# Patient Record
Sex: MALE | Race: WHITE | Employment: FULL TIME | URBAN - METROPOLITAN AREA
[De-identification: names, ages, dates, MRNs, and addresses within clinical notes are randomized per-mention and may not be internally consistent; named-entity substitution may affect disease eponyms.]

---

## 2017-02-13 ENCOUNTER — TELEPHONE (OUTPATIENT)
Dept: FAMILY MEDICINE | Facility: CLINIC | Age: 27
End: 2017-02-13

## 2017-02-13 ENCOUNTER — HOSPITAL ENCOUNTER (EMERGENCY)
Facility: CLINIC | Age: 27
Discharge: HOME | End: 2017-02-13
Attending: PHYSICIAN ASSISTANT | Admitting: PHYSICIAN ASSISTANT
Payer: COMMERCIAL

## 2017-02-13 ENCOUNTER — APPOINTMENT (OUTPATIENT)
Dept: CT IMAGING | Facility: CLINIC | Age: 27
End: 2017-02-13
Attending: PHYSICIAN ASSISTANT
Payer: COMMERCIAL

## 2017-02-13 VITALS
TEMPERATURE: 98.5 F | SYSTOLIC BLOOD PRESSURE: 120 MMHG | OXYGEN SATURATION: 97 % | WEIGHT: 162 LBS | BODY MASS INDEX: 22.81 KG/M2 | HEART RATE: 74 BPM | DIASTOLIC BLOOD PRESSURE: 80 MMHG | RESPIRATION RATE: 16 BRPM

## 2017-02-13 DIAGNOSIS — S20.212A CONTUSION OF RIB ON LEFT SIDE, INITIAL ENCOUNTER: ICD-10-CM

## 2017-02-13 LAB
ALBUMIN UR-MCNC: NEGATIVE MG/DL
ANION GAP SERPL CALCULATED.3IONS-SCNC: 5 MMOL/L (ref 3–14)
APPEARANCE UR: CLEAR
BACTERIA #/AREA URNS HPF: ABNORMAL /HPF
BASOPHILS # BLD AUTO: 0 10E9/L (ref 0–0.2)
BASOPHILS NFR BLD AUTO: 0.2 %
BILIRUB UR QL STRIP: NEGATIVE
BUN SERPL-MCNC: 11 MG/DL (ref 7–30)
CALCIUM SERPL-MCNC: 9.6 MG/DL (ref 8.5–10.1)
CHLORIDE SERPL-SCNC: 109 MMOL/L (ref 94–109)
CO2 SERPL-SCNC: 30 MMOL/L (ref 20–32)
COLOR UR AUTO: YELLOW
CREAT SERPL-MCNC: 0.65 MG/DL (ref 0.66–1.25)
DIFFERENTIAL METHOD BLD: NORMAL
EOSINOPHIL # BLD AUTO: 0.2 10E9/L (ref 0–0.7)
EOSINOPHIL NFR BLD AUTO: 2.4 %
ERYTHROCYTE [DISTWIDTH] IN BLOOD BY AUTOMATED COUNT: 12.5 % (ref 10–15)
GFR SERPL CREATININE-BSD FRML MDRD: ABNORMAL ML/MIN/1.7M2
GLUCOSE SERPL-MCNC: 101 MG/DL (ref 70–99)
GLUCOSE UR STRIP-MCNC: 500 MG/DL
HCT VFR BLD AUTO: 45.9 % (ref 40–53)
HGB BLD-MCNC: 16 G/DL (ref 13.3–17.7)
HGB UR QL STRIP: NEGATIVE
IMM GRANULOCYTES # BLD: 0 10E9/L (ref 0–0.4)
IMM GRANULOCYTES NFR BLD: 0.2 %
KETONES UR STRIP-MCNC: NEGATIVE MG/DL
LEUKOCYTE ESTERASE UR QL STRIP: NEGATIVE
LYMPHOCYTES # BLD AUTO: 2.4 10E9/L (ref 0.8–5.3)
LYMPHOCYTES NFR BLD AUTO: 29.5 %
MCH RBC QN AUTO: 30 PG (ref 26.5–33)
MCHC RBC AUTO-ENTMCNC: 34.9 G/DL (ref 31.5–36.5)
MCV RBC AUTO: 86 FL (ref 78–100)
MONOCYTES # BLD AUTO: 0.4 10E9/L (ref 0–1.3)
MONOCYTES NFR BLD AUTO: 5.1 %
NEUTROPHILS # BLD AUTO: 5.1 10E9/L (ref 1.6–8.3)
NEUTROPHILS NFR BLD AUTO: 62.6 %
NITRATE UR QL: NEGATIVE
PH UR STRIP: 7 PH (ref 5–7)
PLATELET # BLD AUTO: 184 10E9/L (ref 150–450)
POTASSIUM SERPL-SCNC: 4.1 MMOL/L (ref 3.4–5.3)
RBC # BLD AUTO: 5.33 10E12/L (ref 4.4–5.9)
RBC #/AREA URNS AUTO: ABNORMAL /HPF (ref 0–2)
SODIUM SERPL-SCNC: 144 MMOL/L (ref 133–144)
SP GR UR STRIP: 1.01 (ref 1–1.03)
URN SPEC COLLECT METH UR: ABNORMAL
UROBILINOGEN UR STRIP-ACNC: 0.2 EU/DL (ref 0.2–1)
WBC # BLD AUTO: 8.2 10E9/L (ref 4–11)
WBC #/AREA URNS AUTO: ABNORMAL /HPF (ref 0–2)

## 2017-02-13 PROCEDURE — 25000128 H RX IP 250 OP 636: Performed by: PHYSICIAN ASSISTANT

## 2017-02-13 PROCEDURE — 96374 THER/PROPH/DIAG INJ IV PUSH: CPT | Mod: 59

## 2017-02-13 PROCEDURE — 85025 COMPLETE CBC W/AUTO DIFF WBC: CPT | Performed by: PHYSICIAN ASSISTANT

## 2017-02-13 PROCEDURE — 81001 URINALYSIS AUTO W/SCOPE: CPT | Performed by: FAMILY MEDICINE

## 2017-02-13 PROCEDURE — 25000125 ZZHC RX 250: Performed by: PHYSICIAN ASSISTANT

## 2017-02-13 PROCEDURE — 99285 EMERGENCY DEPT VISIT HI MDM: CPT | Mod: 25

## 2017-02-13 PROCEDURE — 99285 EMERGENCY DEPT VISIT HI MDM: CPT | Performed by: PHYSICIAN ASSISTANT

## 2017-02-13 PROCEDURE — 80048 BASIC METABOLIC PNL TOTAL CA: CPT | Performed by: PHYSICIAN ASSISTANT

## 2017-02-13 PROCEDURE — 25500064 ZZH RX 255 OP 636: Performed by: PHYSICIAN ASSISTANT

## 2017-02-13 PROCEDURE — 71260 CT THORAX DX C+: CPT

## 2017-02-13 RX ORDER — IOPAMIDOL 755 MG/ML
500 INJECTION, SOLUTION INTRAVASCULAR ONCE
Status: COMPLETED | OUTPATIENT
Start: 2017-02-13 | End: 2017-02-13

## 2017-02-13 RX ORDER — HYDROCODONE BITARTRATE AND ACETAMINOPHEN 5; 325 MG/1; MG/1
1-2 TABLET ORAL EVERY 6 HOURS PRN
Qty: 15 TABLET | Refills: 0 | Status: SHIPPED | OUTPATIENT
Start: 2017-02-13 | End: 2017-03-30

## 2017-02-13 RX ORDER — KETOROLAC TROMETHAMINE 30 MG/ML
30 INJECTION, SOLUTION INTRAMUSCULAR; INTRAVENOUS ONCE
Status: COMPLETED | OUTPATIENT
Start: 2017-02-13 | End: 2017-02-13

## 2017-02-13 RX ORDER — LIDOCAINE 40 MG/G
CREAM TOPICAL
Status: DISCONTINUED | OUTPATIENT
Start: 2017-02-13 | End: 2017-02-13 | Stop reason: HOSPADM

## 2017-02-13 RX ADMIN — KETOROLAC TROMETHAMINE 30 MG: 30 INJECTION, SOLUTION INTRAMUSCULAR at 14:25

## 2017-02-13 RX ADMIN — IOPAMIDOL 70 ML: 755 INJECTION, SOLUTION INTRAVENOUS at 14:02

## 2017-02-13 RX ADMIN — SODIUM CHLORIDE 70 ML: 9 INJECTION, SOLUTION INTRAVENOUS at 14:02

## 2017-02-13 ASSESSMENT — ENCOUNTER SYMPTOMS
NAUSEA: 0
NECK PAIN: 0
COLOR CHANGE: 0
ABDOMINAL PAIN: 0
BACK PAIN: 1
FLANK PAIN: 1
VOMITING: 0
WOUND: 0
NUMBNESS: 0
DIARRHEA: 0
SHORTNESS OF BREATH: 1
WEAKNESS: 0
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
## 2017-02-13 NOTE — DISCHARGE INSTRUCTIONS
Use ibuprofen or Tylenol as needed for pain during the day, reserve use of Norco for at bedtime for severe breakthrough pain. Practice taking deep breaths to help keep lungs expanded. Pain can last up to 6 weeks, but should start to improve in a week or two. If you experience worsening shortness of breath or chest pain, return to the emergency department immediately. Follow up in clinic as needed for persistent symptoms.    Thank you for choosing [facility] Emergency Department. It was a pleasure taking care of you today. If you have any questions, please call [phone].    Aleida Adam PA-C    Rib Contusion    A rib contusion is a bruise to one or more rib bones. It may cause pain, tenderness, swelling and a purplish discoloration. There may be a sharp pain while breathing  You will be assessed for other injuries. You will likely be given medication for pain. Rib contusions heal on their own, without further treatment. However, pain may take weeks to months to go away.   Note that a small crack (fracture) in the rib may cause the same symptoms as a rib contusion. The small crack may not be seen on a chest x-ray. However, the two conditions are managed in the same way.  Home care    Rest. Avoid heavy lifting, strenuous exertion, or any activity that causes pain.    Ice the area to reduce pain and swelling. Put ice cubes in a plastic bag or use a cold pack. (Wrap the cold source in a thin towel. Do not place it directly on your skin.) Ice the injured area for 20 minutes every 1-2 hours the first day. Continue with ice packs 3-4 times a day for the next two days, then as needed for the relief of pain and swelling.    Take any prescribed pain medication. If none was prescribed, take acetaminophen, ibuprofen, or naproxen to control pain.  Follow-up care  Follow up with your healthcare provider during the next week or as directed.  When to seek medical advice  Call your healthcare provider for any of  the following:    Shortness of breath or trouble breathing    Increasing chest pain with breathing    Coughing    Dizziness, weakness, or fainting    New or worsening pain    Fever of 100.4 F (38 C) or higher, or as directed by your healthcare provider
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
## 2017-02-13 NOTE — ED AVS SNAPSHOT
[facility] Emergency Department    [address]     [place] [address]    Phone:  [phone]    Fax:  [phone]                                       Rafat Baird   MRN: [id number]    Department:  [facility] Emergency Department   Date of Visit:  2/13/2017           Patient Information     Date Of Birth          1990        Your diagnoses for this visit were:     Contusion of rib on left side, initial encounter        You were seen by Aleida Adam PA-C.      Follow-up Information     Follow up with Jose Sood MD In 2 weeks.    Specialty:  Family Practice    Why:  As needed for persisting symptoms    Contact information:    [facility]  [address]   [place] [address] [phone]          Follow up with [facility] Emergency Department.    Specialty:  EMERGENCY MEDICINE    Why:  If symptoms worsen    Contact information:    [address]   [place] [address] [phone]    Additional information:    From [address]: Exit at [address] on [address] side of [place]. Turn right on [address]. Turn left at stoplight on [address]. [facility] will be in view two blocks ahead        Discharge Instructions         Use ibuprofen or Tylenol as needed for pain during the day, reserve use of Norco for at bedtime for severe breakthrough pain. Practice taking deep breaths to help keep lungs expanded. Pain can last up to 6 weeks, but should start to improve in a week or two. If you experience worsening shortness of breath or chest pain, return to the emergency department immediately. Follow up in clinic as needed for persistent symptoms.    Thank you for choosing [facility] Emergency Department. It was a pleasure taking care of you today. If you have any questions, please call [phone].    Aleida Adam PA-C    Rib Contusion    A rib contusion is a bruise to one or more rib bones. It may cause  pain, tenderness, swelling and a purplish discoloration. There may be a sharp pain while breathing  You will be assessed for other injuries. You will likely be given medication for pain. Rib contusions heal on their own, without further treatment. However, pain may take weeks to months to go away.   Note that a small crack (fracture) in the rib may cause the same symptoms as a rib contusion. The small crack may not be seen on a chest x-ray. However, the two conditions are managed in the same way.  Home care    Rest. Avoid heavy lifting, strenuous exertion, or any activity that causes pain.    Ice the area to reduce pain and swelling. Put ice cubes in a plastic bag or use a cold pack. (Wrap the cold source in a thin towel. Do not place it directly on your skin.) Ice the injured area for 20 minutes every 1-2 hours the first day. Continue with ice packs 3-4 times a day for the next two days, then as needed for the relief of pain and swelling.    Take any prescribed pain medication. If none was prescribed, take acetaminophen, ibuprofen, or naproxen to control pain.  Follow-up care  Follow up with your healthcare provider during the next week or as directed.  When to seek medical advice  Call your healthcare provider for any of the following:    Shortness of breath or trouble breathing    Increasing chest pain with breathing    Coughing    Dizziness, weakness, or fainting    New or worsening pain    Fever of 100.4 F (38 C) or higher, or as directed by your healthcare provider        24 Hour Appointment Hotline       To make an appointment at any [facility], call [phone] ([phone]). If you don't have a family doctor or clinic, we will help you find one. [facility] clinics are conveniently located to serve the needs of you and your family.             Review of your medicines      START taking        Dose / Directions Last dose taken    HYDROcodone-acetaminophen 5-325 MG per tablet   Commonly known as:   NORCO   Dose:  1-2 tablet   Quantity:  15 tablet        Take 1-2 tablets by mouth every 6 hours as needed for moderate to severe pain   Refills:  0          Our records show that you are taking the medicines listed below. If these are incorrect, please call your family doctor or clinic.        Dose / Directions Last dose taken    ACCU-CHEK ROBSON test strip   Quantity:  100 strip   Generic drug:  blood glucose monitoring        Use to test blood sugars 6-7 times daily.   Refills:  prn        * ACE/ARB NOT PRESCRIBED (INTENTIONAL)        by Other route continuous prn.   Refills:  0        albuterol 108 (90 BASE) MCG/ACT Inhaler   Commonly known as:  PROAIR HFA/PROVENTIL HFA/VENTOLIN HFA   Dose:  2 puff   Quantity:  1 Inhaler        Inhale 2 puffs into the lungs every 6 hours as needed for shortness of breath / dyspnea or wheezing   Refills:  0        * ASPIRIN NOT PRESCRIBED   Commonly known as:  INTENTIONAL        by Other route continuous prn.   Refills:  0        HumaLOG KWIKpen 100 UNIT/ML injection   Quantity:  15 mL   Generic drug:  insulin lispro        INJECT SUBCUTANEOUSLY 3 TIMES DAILY (BEFORE MEALS) USE ACCORDING TO SLIDING SCALE   Refills:  0        IBUPROFEN PO   Dose:  600 mg        Take 600 mg by mouth   Refills:  0        insulin pen needle 31G X 5 MM   Dose:  1 Box   Quantity:  100 each        1 Box See Admin Instructions Use 4 time(s) a day.   Refills:  6        LANTUS SOLOSTAR 100 UNIT/ML injection   Quantity:  15 mL   Generic drug:  insulin glargine        INJECT 45 UNITS SUBCUTANEOUSLY AT BEDTIME   Refills:  2        * STATIN NOT PRESCRIBED (INTENTIONAL)   Quantity:  0 each        Statin not prescribed intentionally due to {CHOOSE REASON BEFORE SIGNING!!!:000502}   Refills:  0        * Notice:  This list has 3 medication(s) that are the same as other medications prescribed for you. Read the directions carefully, and ask your doctor or other care provider to review them with you.           "  Prescriptions were sent or printed at these locations (1 Prescription)                   [facility], [place] - [address]    [address] , [place] [address]    Telephone:  [phone]   Fax:  [phone]   Hours:                  Printed at Department/Unit printer (1 of 1)         HYDROcodone-acetaminophen (NORCO) 5-325 MG per tablet                Procedures and tests performed during your visit     Basic metabolic panel    CBC with platelets differential    CT Chest Pulmonary Embolism w Contrast    UA with Microscopic      Orders Needing Specimen Collection     None      Pending Results     No orders found from 2017 to 2017.            Pending Culture Results     No orders found from 2017 to 2017.            Thank you for choosing [facility]       Thank you for choosing [facility] for your care. Our goal is always to provide you with excellent care. Hearing back from our patients is one way we can continue to improve our services. Please take a few minutes to complete the written survey that you may receive in the mail after you visit with us. Thank you!        [employer] Information     [employer] lets you send messages to your doctor, view your test results, renew your prescriptions, schedule appointments and more. To sign up, go to [email or web address] . Click on \"Log in\" on the left side of the screen, which will take you to the Welcome page. Then click on \"Sign up Now\" on the right side of the page.     You will be asked to enter the access code listed below, as well as some personal information. Please follow the directions to create your username and password.     Your access code is: [email or web address]  Expires: 2017  3:14 PM     Your access code will  in 90 days. If you need help or a new code, please call your [facility] clinic or [phone].        Care EveryWhere ID     This is your Care EveryWhere ID. This could be used by other organizations to " access your [facility] medical records  [id number]        After Visit Summary       This is your record. Keep this with you and show to your community pharmacist(s) and doctor(s) at your next visit.
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
## 2017-02-13 NOTE — ED AVS SNAPSHOT
[facility] Emergency Department    [address] [place] [address]    Phone:  [phone]    Fax:  [phone]                                       Rafat Baird   MRN: [id number]    Department:  [facility] Emergency Department   Date of Visit:  2/13/2017           After Visit Summary Signature Page     I have received my discharge instructions, and my questions have been answered. I have discussed any challenges I see with this plan with the nurse or doctor.    ..........................................................................................................................................  Patient/Patient Representative Signature      ..........................................................................................................................................  Patient Representative Print Name and Relationship to Patient    ..................................................               ................................................  Date                                            Time    ..........................................................................................................................................  Reviewed by Signature/Title    ...................................................              ..............................................  Date                                                            Time
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
## 2017-03-01 NOTE — PROGRESS NOTES
SUBJECTIVE:                                                    Rafat Baird is a 26 year old male who presents to clinic today for the following health issues:      ED/UC Followup:    Facility:  [facility]  Date of visit: 02/13/17  Reason for visit: Contusion of left side   Current Status: minor pain that comes and goes     Acute Illness   Acute illness concerns: Sinus  Onset: several weeks with pain developing on Monday    Fever: no    Chills/Sweats: YES    Headache (location?): YES    Sinus Pressure:YES- post-nasal drainage, facial pain and teeth pain    Conjunctivitis:  no    Ear Pain: YES: bilateral    Rhinorrhea: YES    Congestion: YES    Sore Throat: no     Cough: YES-productive of yellow sputum, productive of green sputum    Wheeze: YES    Decrease d Appetite: no    Nausea: YES    Vomiting: no    Diarrhea:  no    Dysuria/Freq.: no    Fatigue/Achiness: YES    Sick/Strep Exposure: no     Therapies Tried and outcome: mucinex    Patient is here both for follow up of contusion of the left ribs which he reports has much improved. More importantly to him is development of sinus pressure and pain over the last 4-5 days. He reports that he had cold and allergy symptoms for the last several weeks.   -------------------------------------    Problem list and histories reviewed & adjusted, as indicated.  Additional history: as documented    BP Readings from Last 3 Encounters:   03/02/17 120/82   02/13/17 120/80   10/24/16 120/62    Wt Readings from Last 3 Encounters:   03/02/17 166 lb (75.3 kg)   02/13/17 162 lb (73.5 kg)   10/24/16 165 lb 14.4 oz (75.3 kg)                    Reviewed and updated as needed this visit by clinical staff  Tobacco  Allergies  Meds  Problems  Med Hx  Surg Hx  Fam Hx  Soc Hx        Reviewed and updated as needed this visit by Provider  Tobacco  Allergies  Meds  Problems  Med Hx  Surg Hx  Fam Hx  Soc Hx          ROS:  Constitutional, HEENT, cardiovascular, pulmonary, gi and gu  "systems are negative, except as otherwise noted.    OBJECTIVE:                                                    /82 (Cuff Size: Adult Regular)  Pulse 64  Temp 97.8  F (36.6  C) (Temporal)  Resp 16  Ht 5' 10.67\" (1.795 m)  Wt 166 lb (75.3 kg)  BMI 23.37 kg/m2  Body mass index is 23.37 kg/(m^2).  GENERAL: alert and no distress  EYES: Eyes grossly normal to inspection, PERRL and conjunctivae and sclerae normal  HENT: normal cephalic/atraumatic, both ears: clear effusion, rhinorrhea yellow, oropharynx clear, oral mucous membranes moist and sinuses: maxillary tenderness on both sides  NECK: bilateral anterior cervical adenopathy  RESP: lungs clear to auscultation - no rales, rhonchi or wheezes  CHEST: no pain to palpation, equal chest wall movement  CV: regular rates and rhythm, peripheral pulses strong and no peripheral edema  MS: no gross musculoskeletal defects noted, no edema  SKIN: no suspicious lesions or rashes  PSYCH: mentation appears normal, affect normal/bright    Diagnostic Test Results:  none      ASSESSMENT/PLAN:                                                      Tobacco Cessation:   reports that he has been smoking.  He has a 0.25 pack-year smoking history. He has never used smokeless tobacco.  Tobacco Cessation Action Plan: Self help information given to patient        ICD-10-CM    1. Acute sinusitis with symptoms > 10 days J01.90 amoxicillin (AMOXIL) 875 MG tablet   2. Tobacco use disorder F17.200    3. Hospital discharge follow-up Z09    4. Chest wall contusion, left, subsequent encounter S20.212D        I will treat for sinus infection and have patient continue home cares for rib pain. I discussed smoking cessation and encouraged him to work on a quit plan. Follow up if worsening or not improving.  See Patient Instructions    Michelle Neri PA-C  [facility]      "

## 2017-03-02 ENCOUNTER — OFFICE VISIT (OUTPATIENT)
Dept: FAMILY MEDICINE | Facility: OTHER | Age: 27
End: 2017-03-02
Payer: COMMERCIAL

## 2017-03-02 VITALS
RESPIRATION RATE: 16 BRPM | HEART RATE: 64 BPM | SYSTOLIC BLOOD PRESSURE: 120 MMHG | TEMPERATURE: 97.8 F | BODY MASS INDEX: 23.24 KG/M2 | HEIGHT: 71 IN | WEIGHT: 166 LBS | DIASTOLIC BLOOD PRESSURE: 82 MMHG

## 2017-03-02 DIAGNOSIS — Z09 HOSPITAL DISCHARGE FOLLOW-UP: ICD-10-CM

## 2017-03-02 DIAGNOSIS — J01.90 ACUTE SINUSITIS WITH SYMPTOMS > 10 DAYS: Primary | ICD-10-CM

## 2017-03-02 DIAGNOSIS — F17.200 TOBACCO USE DISORDER: ICD-10-CM

## 2017-03-02 DIAGNOSIS — S20.212D CHEST WALL CONTUSION, LEFT, SUBSEQUENT ENCOUNTER: ICD-10-CM

## 2017-03-02 PROCEDURE — 99214 OFFICE O/P EST MOD 30 MIN: CPT | Performed by: PHYSICIAN ASSISTANT

## 2017-03-02 RX ORDER — AMOXICILLIN 875 MG
875 TABLET ORAL 2 TIMES DAILY
Qty: 20 TABLET | Refills: 0 | Status: SHIPPED | OUTPATIENT
Start: 2017-03-02 | End: 2017-03-30

## 2017-03-02 ASSESSMENT — PAIN SCALES - GENERAL: PAINLEVEL: MILD PAIN (3)

## 2017-03-02 NOTE — NURSING NOTE
"Chief Complaint   Patient presents with     Hospital F/U     Panel Management     pneumovax, mychart, ldl, a1c, microalbumin, eye exam, foot exam       Initial /82 (Cuff Size: Adult Regular)  Pulse 64  Temp 97.8  F (36.6  C) (Temporal)  Resp 16  Ht 5' 10.67\" (1.795 m)  Wt 166 lb (75.3 kg)  BMI 23.37 kg/m2 Estimated body mass index is 23.37 kg/(m^2) as calculated from the following:    Height as of this encounter: 5' 10.67\" (1.795 m).    Weight as of this encounter: 166 lb (75.3 kg).  Medication Reconciliation: complete     Yashira Irby CMA (AAMA)      "

## 2017-03-02 NOTE — PATIENT INSTRUCTIONS
Sinusitis (Antibiotic Treatment)     The sinuses are air-filled spaces within the bones of the face. They connect to the inside of the nose. Sinusitis is an inflammation of the tissue lining the sinus cavity. Sinus inflammation can occur during a cold. It can also be due to allergies to pollens and other particles in the air. Sinusitis can cause symptoms of sinus congestion and fullness. A sinus infection causes fever, headache and facial pain. There is often green or yellow drainage from the nose or into the back of the throat (post-nasal drip). You have been given antibiotics to treat this condition.  Home care:    Take the full course of antibiotics as instructed. Do not stop taking them, even if you feel better.    Drink plenty of water, hot tea, and other liquids. This may help thin mucus. It also may promote sinus drainage.    Heat may help soothe painful areas of the face. Use a towel soaked in hot water. Or,  the shower and direct the hot spray onto your face. Using a vaporizer along with a menthol rub at night may also help.     An expectorant containing guaifenesin may help thin the mucus and promote drainage from the sinuses.    Over-the-counter decongestants may be used unless a similar medicine was prescribed. Nasal sprays work the fastest. Use one that contains phenylephrine or oxymetazoline. First blow the nose gently. Then use the spray. Do not use these medicines more often than directed on the label or symptoms may get worse. You may also use tablets containing pseudoephedrine. Avoid products that combine ingredients, because side effects may be increased. Read labels. You can also ask the pharmacist for help. (NOTE: Persons with high blood pressure should not use decongestants. They can raise blood pressure.)    Over-the-counter antihistamines may help if allergies contributed to your sinusitis.      Do not use nasal rinses or irrigation during an acute sinus infection, unless told to by  your health care provider. Rinsing may spread the infection to other sinuses.    Use acetaminophen or ibuprofen to control pain, unless another pain medicine was prescribed. (If you have chronic liver or kidney disease or ever had a stomach ulcer, talk with your doctor before using these medicines. Aspirin should never be used in anyone under 18 years of age who is ill with a fever. It may cause severe liver damage.)    Don't smoke. This can worsen symptoms.  Follow-up care  Follow up with your healthcare provider or our staff if you are not improving within the next week.  When to seek medical advice  Call your healthcare provider if any of these occur:    Facial pain or headache becoming more severe    Stiff neck    Unusual drowsiness or confusion    Swelling of the forehead or eyelids    Vision problems, including blurred or double vision    Fever of 100.4 F (38 C) or higher, or as directed by your healthcare provider    Seizure    Breathing problems    Symptoms not resolving within 10 days    5253-9242 [employer]. [address] [place] [address], [place] [address]. All rights reserved. This information is not intended as a substitute for professional medical care. Always follow your healthcare professional's instructions.

## 2017-03-02 NOTE — MR AVS SNAPSHOT
After Visit Summary   3/2/2017    Rafat Baird    MRN: [id number]           Patient Information     Date Of Birth          1990        Visit Information        Provider Department      3/2/2017 8:40 AM Michelle Neri PA-C [facility]        Today's Diagnoses     Acute sinusitis with symptoms > 10 days    -  1    Tobacco use disorder          Care Instructions      Sinusitis (Antibiotic Treatment)     The sinuses are air-filled spaces within the bones of the face. They connect to the inside of the nose. Sinusitis is an inflammation of the tissue lining the sinus cavity. Sinus inflammation can occur during a cold. It can also be due to allergies to pollens and other particles in the air. Sinusitis can cause symptoms of sinus congestion and fullness. A sinus infection causes fever, headache and facial pain. There is often green or yellow drainage from the nose or into the back of the throat (post-nasal drip). You have been given antibiotics to treat this condition.  Home care:    Take the full course of antibiotics as instructed. Do not stop taking them, even if you feel better.    Drink plenty of water, hot tea, and other liquids. This may help thin mucus. It also may promote sinus drainage.    Heat may help soothe painful areas of the face. Use a towel soaked in hot water. Or,  the shower and direct the hot spray onto your face. Using a vaporizer along with a menthol rub at night may also help.     An expectorant containing guaifenesin may help thin the mucus and promote drainage from the sinuses.    Over-the-counter decongestants may be used unless a similar medicine was prescribed. Nasal sprays work the fastest. Use one that contains phenylephrine or oxymetazoline. First blow the nose gently. Then use the spray. Do not use these medicines more often than directed on the label or symptoms may get worse. You may also use tablets containing pseudoephedrine. Avoid products  that combine ingredients, because side effects may be increased. Read labels. You can also ask the pharmacist for help. (NOTE: Persons with high blood pressure should not use decongestants. They can raise blood pressure.)    Over-the-counter antihistamines may help if allergies contributed to your sinusitis.      Do not use nasal rinses or irrigation during an acute sinus infection, unless told to by your health care provider. Rinsing may spread the infection to other sinuses.    Use acetaminophen or ibuprofen to control pain, unless another pain medicine was prescribed. (If you have chronic liver or kidney disease or ever had a stomach ulcer, talk with your doctor before using these medicines. Aspirin should never be used in anyone under 18 years of age who is ill with a fever. It may cause severe liver damage.)    Don't smoke. This can worsen symptoms.  Follow-up care  Follow up with your healthcare provider or our staff if you are not improving within the next week.  When to seek medical advice  Call your healthcare provider if any of these occur:    Facial pain or headache becoming more severe    Stiff neck    Unusual drowsiness or confusion    Swelling of the forehead or eyelids    Vision problems, including blurred or double vision    Fever of 100.4 F (38 C) or higher, or as directed by your healthcare provider    Seizure    Breathing problems    Symptoms not resolving within 10 days    6199-3108 [employer]. [address] [place] [address], [place] [address]. All rights reserved. This information is not intended as a substitute for professional medical care. Always follow your healthcare professional's instructions.              Follow-ups after your visit        Who to contact     If you have questions or need follow up information about today's clinic visit or your schedule please contact [facility] directly at [phone].  Normal or non-critical lab and imaging results will be  "communicated to you by Kraken[employer], letter or phone within 4 business days after the clinic has received the results. If you do not hear from us within 7 days, please contact the clinic through [employer] or phone. If you have a critical or abnormal lab result, we will notify you by phone as soon as possible.  Submit refill requests through [employer] or call your pharmacy and they will forward the refill request to us. Please allow 3 business days for your refill to be completed.          Additional Information About Your Visit        [employer] Information     [employer] lets you send messages to your doctor, view your test results, renew your prescriptions, schedule appointments and more. To sign up, go to [email or web address] . Click on \"Log in\" on the left side of the screen, which will take you to the Welcome page. Then click on \"Sign up Now\" on the right side of the page.     You will be asked to enter the access code listed below, as well as some personal information. Please follow the directions to create your username and password.     Your access code is: [email or web address]  Expires: 2017  3:14 PM     Your access code will  in 90 days. If you need help or a new code, please call your [place] clinic or [phone].        Care EveryWhere ID     This is your Care EveryWhere ID. This could be used by other organizations to access your [place] medical records  [id number]        Your Vitals Were     Pulse Temperature Respirations Height BMI (Body Mass Index)       64 97.8  F (36.6  C) (Temporal) 16 5' 10.67\" (1.795 m) 23.37 kg/m2        Blood Pressure from Last 3 Encounters:   17 120/82   17 120/80   10/24/16 120/62    Weight from Last 3 Encounters:   17 166 lb (75.3 kg)   17 162 lb (73.5 kg)   10/24/16 165 lb 14.4 oz (75.3 kg)              Today, you had the following     No orders found for display         Today's Medication Changes          These changes are accurate as of: 3/2/17  9:06 " AM.  If you have any questions, ask your nurse or doctor.               Start taking these medicines.        Dose/Directions    amoxicillin 875 MG tablet   Commonly known as:  AMOXIL   Used for:  Acute sinusitis with symptoms > 10 days   Started by:  Michelle Neri PA-C        Dose:  875 mg   Take 1 tablet (875 mg) by mouth 2 times daily   Quantity:  20 tablet   Refills:  0            Where to get your medicines      These medications were sent to [facility][id number] - [place], [place] - [address]  [address] [place] [address]     Phone:  [phone]     amoxicillin 875 MG tablet                Primary Care Provider Office Phone # Fax #    Jose Sood [phone]       [facility] [address] [place] [address]        Thank you!     Thank you for choosing [facility]  for your care. Our goal is always to provide you with excellent care. Hearing back from our patients is one way we can continue to improve our services. Please take a few minutes to complete the written survey that you may receive in the mail after your visit with us. Thank you!             Your Updated Medication List - Protect others around you: Learn how to safely use, store and throw away your medicines at www.disposemymeds.org.          This list is accurate as of: 3/2/17  9:06 AM.  Always use your most recent med list.                   Brand Name Dispense Instructions for use    ACCU-CHEK ROBSON test strip   Generic drug:  blood glucose monitoring     100 strip    Use to test blood sugars 6-7 times daily.       * ACE/ARB NOT PRESCRIBED (INTENTIONAL)      by Other route continuous prn.       albuterol 108 (90 BASE) MCG/ACT Inhaler    PROAIR HFA/PROVENTIL HFA/VENTOLIN HFA    1 Inhaler    Inhale 2 puffs into the lungs every 6 hours as needed for shortness of breath / dyspnea or wheezing       amoxicillin 875 MG tablet    AMOXIL    20 tablet    Take 1 tablet (875 mg)  by mouth 2 times daily       * ASPIRIN NOT PRESCRIBED    INTENTIONAL     by Other route continuous prn Reported on 3/2/2017       HumaLOG KWIKpen 100 UNIT/ML injection   Generic drug:  insulin lispro     15 mL    INJECT SUBCUTANEOUSLY 3 TIMES DAILY (BEFORE MEALS) USE ACCORDING TO SLIDING SCALE       HYDROcodone-acetaminophen 5-325 MG per tablet    NORCO    15 tablet    Take 1-2 tablets by mouth every 6 hours as needed for moderate to severe pain       IBUPROFEN PO      Take 600 mg by mouth       insulin pen needle 31G X 5 MM     100 each    1 Box See Admin Instructions Use 4 time(s) a day.       LANTUS SOLOSTAR 100 UNIT/ML injection   Generic drug:  insulin glargine     15 mL    INJECT 45 UNITS SUBCUTANEOUSLY AT BEDTIME       * STATIN NOT PRESCRIBED (INTENTIONAL)     0 each    Reported on 3/2/2017       * Notice:  This list has 3 medication(s) that are the same as other medications prescribed for you. Read the directions carefully, and ask your doctor or other care provider to review them with you.

## 2017-03-22 DIAGNOSIS — E10.43 TYPE 1 DIABETES MELLITUS WITH PERIPHERAL AUTONOMIC NEUROPATHY (H): Primary | ICD-10-CM

## 2017-03-22 RX ORDER — INSULIN GLARGINE 100 [IU]/ML
45 INJECTION, SOLUTION SUBCUTANEOUS DAILY
Qty: 15 ML | Refills: 3 | Status: SHIPPED | OUTPATIENT
Start: 2017-03-22 | End: 2017-03-30

## 2017-03-29 DIAGNOSIS — E10.43 TYPE 1 DIABETES MELLITUS WITH PERIPHERAL AUTONOMIC NEUROPATHY (H): Primary | ICD-10-CM

## 2017-03-30 ENCOUNTER — OFFICE VISIT (OUTPATIENT)
Dept: FAMILY MEDICINE | Facility: CLINIC | Age: 27
End: 2017-03-30
Payer: COMMERCIAL

## 2017-03-30 VITALS
RESPIRATION RATE: 20 BRPM | BODY MASS INDEX: 22.95 KG/M2 | OXYGEN SATURATION: 100 % | TEMPERATURE: 98.1 F | DIASTOLIC BLOOD PRESSURE: 60 MMHG | HEART RATE: 74 BPM | WEIGHT: 163 LBS | SYSTOLIC BLOOD PRESSURE: 120 MMHG

## 2017-03-30 DIAGNOSIS — E10.43 TYPE 1 DIABETES MELLITUS WITH PERIPHERAL AUTONOMIC NEUROPATHY (H): ICD-10-CM

## 2017-03-30 LAB
CHOLEST SERPL-MCNC: 144 MG/DL
CREAT UR-MCNC: 22 MG/DL
HBA1C MFR BLD: 8.6 % (ref 4.3–6)
HDLC SERPL-MCNC: 37 MG/DL
LDLC SERPL CALC-MCNC: 92 MG/DL
MICROALBUMIN UR-MCNC: 6 MG/L
MICROALBUMIN/CREAT UR: 27.31 MG/G CR (ref 0–17)
NONHDLC SERPL-MCNC: 107 MG/DL
TRIGL SERPL-MCNC: 77 MG/DL

## 2017-03-30 PROCEDURE — 99213 OFFICE O/P EST LOW 20 MIN: CPT | Performed by: FAMILY MEDICINE

## 2017-03-30 PROCEDURE — 83036 HEMOGLOBIN GLYCOSYLATED A1C: CPT | Performed by: FAMILY MEDICINE

## 2017-03-30 PROCEDURE — 36415 COLL VENOUS BLD VENIPUNCTURE: CPT | Performed by: FAMILY MEDICINE

## 2017-03-30 PROCEDURE — 80061 LIPID PANEL: CPT | Performed by: FAMILY MEDICINE

## 2017-03-30 PROCEDURE — 82043 UR ALBUMIN QUANTITATIVE: CPT | Performed by: FAMILY MEDICINE

## 2017-03-30 RX ORDER — INSULIN GLARGINE 100 [IU]/ML
45 INJECTION, SOLUTION SUBCUTANEOUS DAILY
Qty: 15 ML | Refills: 11 | Status: SHIPPED | OUTPATIENT
Start: 2017-03-30 | End: 2018-05-14

## 2017-03-30 NOTE — MR AVS SNAPSHOT
After Visit Summary   3/30/2017    Rafat Baird    MRN: [id number]           Patient Information     Date Of Birth          1990        Visit Information        Provider Department      3/30/2017 9:20 AM Jose Sood MD [facility]        Today's Diagnoses     Type 1 diabetes mellitus with peripheral autonomic neuropathy (H)           Follow-ups after your visit        Additional Services     MED THERAPY MANAGE REFERRAL       Your provider has referred you to: **[facility] Scheduling (numerous locations) [phone]   [email or web address]  FM[facility]: [facility] [phone]   [email or web address]    Reason for Referral: diabetes    The [facility] department will contact you to schedule an appointment.  You may also schedule the appointment by calling [phone].  For [facility] patients, please call [phone] to confirm/schedule your appointment on the next business day.    This service is designed to help you get the most from your medications.  A specially trained Pharmacist will work closely with you and your providers to solve any questions, concerns, issues or problems related to your medications.    Please bring all of your prescription and non-prescription medications (such as vitamins, over-the-counter medications, and herbals) or a detailed medication list to your appointment.    If you have a glucose meter or other home monitoring information, please also bring this to your appointment (i.e. blood glucose log, blood pressure log, pain log, etc.).                  Your next 10 appointments already scheduled     Mar 31, 2017  9:00 AM CDT   Office Visit with Darrian Gilbert [facility] ([facility])    [address] [place] [id number]  "  [phone]           Bring a current list of meds and any records pertaining to this visit.  For Physicals, please bring immunization records and any forms needing to be filled out.  Please arrive 10 minutes early to complete paperwork.              Who to contact     If you have questions or need follow up information about today's clinic visit or your schedule please contact [facility] directly at [phone].  Normal or non-critical lab and imaging results will be communicated to you by [employer], letter or phone within 4 business days after the clinic has received the results. If you do not hear from us within 7 days, please contact the clinic through [employer] or phone. If you have a critical or abnormal lab result, we will notify you by phone as soon as possible.  Submit refill requests through [employer] or call your pharmacy and they will forward the refill request to us. Please allow 3 business days for your refill to be completed.          Additional Information About Your Visit        [employer] Information     [employer] lets you send messages to your doctor, view your test results, renew your prescriptions, schedule appointments and more. To sign up, go to [email or web address] . Click on \"Log in\" on the left side of the screen, which will take you to the Welcome page. Then click on \"Sign up Now\" on the right side of the page.     You will be asked to enter the access code listed below, as well as some personal information. Please follow the directions to create your username and password.     Your access code is: [email or web address]  Expires: 2017  4:14 PM     Your access code will  in 90 days. If you need help or a new code, please call your [place] clinic or [phone].        Care EveryWhere ID     This is your Care EveryWhere ID. This could be used by other organizations to access your [place] medical records  [id number]        Your Vitals Were     Pulse Temperature Respirations " Pulse Oximetry BMI (Body Mass Index)       74 98.1  F (36.7  C) (Temporal) 20 100% 22.95 kg/m2        Blood Pressure from Last 3 Encounters:   03/30/17 120/60   03/02/17 120/82   02/13/17 120/80    Weight from Last 3 Encounters:   03/30/17 163 lb (73.9 kg)   03/02/17 166 lb (75.3 kg)   02/13/17 162 lb (73.5 kg)              We Performed the Following     Albumin Random Urine Quantitative     Hemoglobin A1c     Lipid Profile (Chol, Trig, HDL, LDL calc)     MED THERAPY MANAGE REFERRAL          Today's Medication Changes          These changes are accurate as of: 3/30/17 10:21 AM.  If you have any questions, ask your nurse or doctor.               These medicines have changed or have updated prescriptions.        Dose/Directions    insulin lispro 100 UNIT/ML injection   Commonly known as:  HumaLOG KWIKpen   This may have changed:  additional instructions   Used for:  Type 1 diabetes mellitus with peripheral autonomic neuropathy (H)   Changed by:  Jose Sood MD        INJECT SUBCUTANEOUSLY 3 TIMES DAILY (BEFORE MEALS) USE ACCORDING TO SLIDING SCALE.   Quantity:  15 mL   Refills:  11            Where to get your medicines      These medications were sent to [facility][id number] - [place], [place] - [address]   [address] [place] [facility] [address]     Phone:  [phone]     insulin glargine 100 UNIT/ML injection    insulin lispro 100 UNIT/ML injection                Primary Care Provider Office Phone # Fax #    Jose Sood [phone]       [facility] [address]   [place] [address]        Thank you!     Thank you for choosing [facility]  for your care. Our goal is always to provide you with excellent care. Hearing back from our patients is one way we can continue to improve our services. Please take a few minutes to complete the written survey that you may receive in the mail after your visit with us. Thank you!             Your Updated  Medication List - Protect others around you: Learn how to safely use, store and throw away your medicines at www.disposemymeds.org.          This list is accurate as of: 3/30/17 10:21 AM.  Always use your most recent med list.                   Brand Name Dispense Instructions for use    ACCU-CHEK ROBSON test strip   Generic drug:  blood glucose monitoring     100 strip    Use to test blood sugars 6-7 times daily.       * ACE/ARB NOT PRESCRIBED (INTENTIONAL)      by Other route continuous prn.       * ASPIRIN NOT PRESCRIBED    INTENTIONAL     by Other route continuous prn Reported on 3/2/2017       IBUPROFEN PO      Take 600 mg by mouth       insulin glargine 100 UNIT/ML injection     15 mL    Inject 45 Units Subcutaneous daily       insulin lispro 100 UNIT/ML injection    HumaLOG KWIKpen    15 mL    INJECT SUBCUTANEOUSLY 3 TIMES DAILY (BEFORE MEALS) USE ACCORDING TO SLIDING SCALE.       insulin pen needle 31G X 5 MM     100 each    1 Box See Admin Instructions Use 4 time(s) a day.       * STATIN NOT PRESCRIBED (INTENTIONAL)     0 each    Reported on 3/2/2017       * Notice:  This list has 3 medication(s) that are the same as other medications prescribed for you. Read the directions carefully, and ask your doctor or other care provider to review them with you.
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
## 2017-03-30 NOTE — NURSING NOTE
"Chief Complaint   Patient presents with     Diabetes       Initial /60 (Cuff Size: Adult Regular)  Pulse 74  Temp 98.1  F (36.7  C) (Temporal)  Resp 20  Wt 163 lb (73.9 kg)  SpO2 100%  BMI 22.95 kg/m2 Estimated body mass index is 22.95 kg/(m^2) as calculated from the following:    Height as of 3/2/17: 5' 10.67\" (1.795 m).    Weight as of this encounter: 163 lb (73.9 kg).  Medication Reconciliation: complete    "

## 2017-03-31 ENCOUNTER — OFFICE VISIT (OUTPATIENT)
Dept: PHARMACY | Facility: CLINIC | Age: 27
End: 2017-03-31
Payer: COMMERCIAL

## 2017-03-31 VITALS — SYSTOLIC BLOOD PRESSURE: 122 MMHG | DIASTOLIC BLOOD PRESSURE: 72 MMHG | WEIGHT: 160.8 LBS | BODY MASS INDEX: 22.64 KG/M2

## 2017-03-31 DIAGNOSIS — E10.43 TYPE 1 DIABETES MELLITUS WITH PERIPHERAL AUTONOMIC NEUROPATHY (H): Primary | ICD-10-CM

## 2017-03-31 PROCEDURE — 99605 MTMS BY PHARM NP 15 MIN: CPT | Performed by: PHARMACIST

## 2017-03-31 PROCEDURE — 99607 MTMS BY PHARM ADDL 15 MIN: CPT | Performed by: PHARMACIST

## 2017-03-31 NOTE — PATIENT INSTRUCTIONS
Recommendations from today's MTM visit:                                                    MTM (medication therapy management) is a service provided by a clinical pharmacist designed to help you get the most of out of your medicines.   Today we reviewed what your medicines are for, how to know if they are working, that your medicines are safe and how to make your medicine regimen as easy as possible.     1. Start taking your Lantus/Basaglar in the morning to see if this helps prevent you from dropping low overnight.    2. Lets increase your Novolog/Humalog insulin to 1 unit per 10 grams of carbohydrates.      3. I will place a referral for diabetes education to see if we can get you in for a continuous glucose monitor.    Next MTM visit: Friday, April 7th at 9 AM    To schedule another MTM appointment, please call the clinic directly or you may call the MTM scheduling line at [phone] or toll-free at [phone].     My Clinical Pharmacist's contact information:                                                      It was a pleasure seeing you today!  Please feel free to contact me with any questions or concerns you have.      Jm Gilbert, Ismael  Medication Therapy Management Provider  Pager (voicemail): [phone]    You may receive a survey about the MTM services you received.  I would appreciate your feedback to help me serve you better in the future. Please fill it out and return it when you can. Your comments will be anonymous.

## 2017-03-31 NOTE — MR AVS SNAPSHOT
After Visit Summary   3/31/2017    Rafat Baird    MRN: [id number]           Patient Information     Date Of Birth          1990        Visit Information        Provider Department      3/31/2017 9:00 AM Darrian Gilbert, [facility] MTM        Today's Diagnoses     Type 1 diabetes mellitus with peripheral autonomic neuropathy (H)    -  1      Care Instructions    Recommendations from today's MTM visit:                                                    MTM (medication therapy management) is a service provided by a clinical pharmacist designed to help you get the most of out of your medicines.   Today we reviewed what your medicines are for, how to know if they are working, that your medicines are safe and how to make your medicine regimen as easy as possible.     1. Start taking your Lantus/Basaglar in the morning to see if this helps prevent you from dropping low overnight.    2. Lets increase your Novolog/Humalog insulin to 1 unit per 10 grams of carbohydrates.      3. I will place a referral for diabetes education to see if we can get you in for a continuous glucose monitor.    Next MTM visit: Friday, April 7th at 9 AM    To schedule another MTM appointment, please call the clinic directly or you may call the MTM scheduling line at [phone] or toll-free at [phone].     My Clinical Pharmacist's contact information:                                                      It was a pleasure seeing you today!  Please feel free to contact me with any questions or concerns you have.      Jm Gilbert, Ismael  Medication Therapy Management Provider  Pager (voicemail): [phone]    You may receive a survey about the MTM services you received.  I would appreciate your feedback to help me serve you better in the future. Please fill it out and return it when you can. Your comments will be anonymous.            Follow-ups after your visit        Additional Services     Diabetes  Educator Referral       DIABETES SELF MANAGEMENT TRAINING (DSMT)      Your provider has referred you to Diabetes Education: FMG: Diabetes Education - All [facility] [phone]   [email or web address]    Type of training and number of hours: Previous Diagnosis: Follow-up DSMT - 2 hours.    Medicare covers: 10 hours of initial DSMT in 12 month period from the time of first visit, plus 2 hours of follow-up DSMT annually, and additional hours as requested for insulin training.    Diabetes Type: Type 1             Diabetes Co-Morbidities: none               A1C Goal:  <7.0       A1C is: Lab Results       Component                Value               Date                       A1C                      8.6                 03/30/2017              If an urgent visit is needed or A1C is above 12, Care Team to call the Diabetes Education Team at [phone] or send an In Basket message to the Diabetes Education Pool (P DIAB ED-PATIENT CARE).    Diabetes Education Topics: Comprehensive Knowledge Assessment and Instruction, Knowledge: Healthy Eating and Continuous Glucose Monitor: Diagnostic CGM (minimum of 72 hours) and Personal CGM    Special Educational Needs Requiring Individual DSMT: None       MEDICAL NUTRITION THERAPY (MNT) for Diabetes    Medical Nutrition Therapy with a Registered Dietitian can be provided in coordination with Diabetes Self-Management Training to assist in achieving optimal diabetes management.    MNT Type and Hours: Previous diagnosis: Annual follow-up MNT - 2 hours                       Medicare will cover: 3 hours initial MNT in 12 month period after first visit, plus 2 hours of follow-up MNT annually    Please be aware that coverage of these services is subject to the terms and limitations of your health insurance plan.  Call member services at your health plan to determine Diabetes Self-Management Training benefits and ask which blood glucose  "monitor brands are covered by your plan.      Please bring the following with you to your appointment:    (1)  List of current medications   (2)  List of Blood Glucose Monitor brands that are covered by your insurance plan  (3)  Blood Glucose Monitor and log book  (4)   Food records for the 3 days prior to your visit    The Certified Diabetes Educator may make diabetes medication adjustments per the CDE Protocol and Collaborative Practice Agreement.                  Your next 10 appointments already scheduled     Apr 07, 2017  9:00 AM CDT   SHORT with Darrian Gilbert [facility] ([facility])    [address] [place] [id number] [phone]              Who to contact     If you have questions or need follow up information about today's clinic visit or your schedule please contact [facility] directly at [phone].  Normal or non-critical lab and imaging results will be communicated to you by MyChart, letter or phone within 4 business days after the clinic has received the results. If you do not hear from us within 7 days, please contact the clinic through You[employer] or phone. If you have a critical or abnormal lab result, we will notify you by phone as soon as possible.  Submit refill requests through [employer] or call your pharmacy and they will forward the refill request to us. Please allow 3 business days for your refill to be completed.          Additional Information About Your Visit        MyChart Information     Firm58 lets you send messages to your doctor, view your test results, renew your prescriptions, schedule appointments and more. To sign up, go to [email or web address] . Click on \"Log in\" on the left side of the screen, which will take you to the Welcome page. Then click on \"Sign up Now\" on the right side of the page.     You will be asked to enter the access code listed below, as well as some personal information. Please " follow the directions to create your username and password.     Your access code is: [email or web address]  Expires: 2017  4:14 PM     Your access code will  in 90 days. If you need help or a new code, please call your [facility] or [phone].        Care EveryWhere ID     This is your Care EveryWhere ID. This could be used by other organizations to access your [place] medical records  [id number]         Blood Pressure from Last 3 Encounters:   17 120/60   17 120/82   17 120/80    Weight from Last 3 Encounters:   17 163 lb (73.9 kg)   17 166 lb (75.3 kg)   17 162 lb (73.5 kg)              We Performed the Following     Diabetes Educator Referral        Primary Care Provider Office Phone # Fax #    Jose Sood [phone]       [facility] [address] [place] [phone]        Thank you!     Thank you for choosing [facility]  for your care. Our goal is always to provide you with excellent care. Hearing back from our patients is one way we can continue to improve our services. Please take a few minutes to complete the written survey that you may receive in the mail after your visit with us. Thank you!             Your Updated Medication List - Protect others around you: Learn how to safely use, store and throw away your medicines at www.disposemymeds.org.          This list is accurate as of: 3/31/17  9:48 AM.  Always use your most recent med list.                   Brand Name Dispense Instructions for use    ACCU-CHEK ROBSON test strip   Generic drug:  blood glucose monitoring     100 strip    Use to test blood sugars 6-7 times daily.       * ACE/ARB NOT PRESCRIBED (INTENTIONAL)      by Other route continuous prn.       * ASPIRIN NOT PRESCRIBED    INTENTIONAL     by Other route continuous prn Reported on 3/2/2017       IBUPROFEN PO      Take 600 mg by mouth       insulin glargine 100 UNIT/ML injection      15 mL    Inject 45 Units Subcutaneous daily       insulin lispro 100 UNIT/ML injection    HumaLOG KWIKpen    15 mL    INJECT SUBCUTANEOUSLY 3 TIMES DAILY (BEFORE MEALS) USE ACCORDING TO SLIDING SCALE.       insulin pen needle 31G X 5 MM     100 each    1 Box See Admin Instructions Use 4 time(s) a day.       * STATIN NOT PRESCRIBED (INTENTIONAL)     0 each    Reported on 3/2/2017       * Notice:  This list has 3 medication(s) that are the same as other medications prescribed for you. Read the directions carefully, and ask your doctor or other care provider to review them with you.

## 2017-03-31 NOTE — Clinical Note
BEBEI - referred him on to you - moving around the timing of his basal insulin before a dose reduction.  He is interested in both diagnostic and personal CGM.  Jm Gilbert, PharmD Medication Therapy Management Provider Pager (voicemail): [phone]

## 2017-03-31 NOTE — PROGRESS NOTES
SUBJECTIVE/OBJECTIVE:                                                    Rafat Baird is a 26 year old male coming in for an initial visit for Medication Therapy Management.  He was referred to me from Dr. Sood.     Chief Complaint: Diabetes Management.  Personal Healthcare Goals: get A1c to goal    Allergies/ADRs: Reviewed in Epic  Tobacco: 0-1 pack per day - is interested in quitting Tobacco Cessation Action Plan: Self help information given to patient  Alcohol: not currently using  Caffeine: 1 energy drinks/week  Activity: Exercises every day: 3-7 mile walk per day.  Up and down ladders cutting down trees.     PMH: Reviewed in Epic    Medication Adherence: no issues reported    Diabetes:  Pt currently taking Basaglar 45 units at bedtime and Humalog three times daily per sliding scale (1 unit per 25 mg/dL at 100 mg/dL plus 1 unit per 15 grams of carbohydrates).  Pt states he typically uses 10-20 units of mealtime insulin per day (14 total units yesterday).  Notes that he has been using same insulin regimen since he was about 10 years old. Pt is very motivated to get A1c under control and does not like the higher numbers he has seen recently.  He expresses interest in working with diabetes education and possibly exploring the idea of both diagnostic/personal CGM. Pt is not experiencing side effects.  SMB-8 times daily.   Ranges (patient reported): for last three weeks about 220-250 mg/dL, 2 hours after - 250-300 mg/dL.  Pre-dinner - 130-140 mg/dL, after dinner 130-140 mg/dL  Symptoms of low blood sugar? sweaty, lots of energy. Frequency of hypoglycemia? 1-2 times per week.  Recent symptoms of high blood sugar? Fatigue, cotton mouth, sometimes an odd taste  Eye exam: due  Foot exam: due  Microalbumin is < 30 mg/g. Pt is not taking an ACEi/ARB.  Aspirin: Not taking due to due to age.   Diet/Exercise: 30-70 grams of carbohydrates per meal with more in the evening, does not snack.  Less food with more carbs due  to his Celiac's disease.  Wonders what are some options for meals/food for him to make sure he is getting enough carbohydrates, but not too many.      Current labs include:  BP Readings from Last 3 Encounters:   03/30/17 120/60   03/02/17 120/82   02/13/17 120/80     Today's Vitals: /72  Wt 160 lb 12.8 oz (72.9 kg)  BMI 22.64 kg/m2  Lab Results   Component Value Date    A1C 8.6 03/30/2017   .  Lab Results   Component Value Date    CHOL 144 03/30/2017     Lab Results   Component Value Date    TRIG 77 03/30/2017     Lab Results   Component Value Date    HDL 37 03/30/2017     Lab Results   Component Value Date    LDL 92 03/30/2017       Liver Function Studies -   Recent Labs   Lab Test  01/31/13   0832   PROTTOTAL  7.1   ALBUMIN  4.3   BILITOTAL  1.0   ALKPHOS  107   AST  23   ALT  31       Lab Results   Component Value Date    UCRR 22 03/30/2017    MICROL 6 03/30/2017    UMALCR 27.31 (H) 03/30/2017       Last Basic Metabolic Panel:  Lab Results   Component Value Date     02/13/2017      Lab Results   Component Value Date    POTASSIUM 4.1 02/13/2017     Lab Results   Component Value Date    CHLORIDE 109 02/13/2017     Lab Results   Component Value Date    BUN 11 02/13/2017     Lab Results   Component Value Date    CR 0.65 02/13/2017     GFR Estimate   Date Value Ref Range Status   02/13/2017 >90  Non  GFR Calc   >60 mL/min/1.7m2 Final   03/16/2015 >90  Non  GFR Calc   >60 mL/min/1.7m2 Final   09/04/2014 >90  Non  GFR Calc   >60 mL/min/1.7m2 Final     TSH   Date Value Ref Range Status   10/12/2015 1.12 0.40 - 4.00 mU/L Final     Most Recent Immunizations   Administered Date(s) Administered     DPT 08/24/1989     DTAP (<7y) 04/21/1993     Influenza (IIV3) 11/01/2013     MMR 04/02/1991     OPV 04/21/1993     TD (ADULT, 7+) 11/08/1999     TDAP Vaccine (Adacel) 05/22/2011     ASSESSMENT:                                                       Current medications  were reviewed today.     Medication Adherence: no issues identified    Diabetes: Needs Improvement. Patient is not meeting A1c goal of < 7%. Self monitoring of blood glucose is not at goal of fasting  mg/dL and post prandial < 150 mg/dL.  Given fasting sugars are higher than pre-bedtime sugars may be dipping in the middle of the night - may first benefit from moving basal insulin to morning and potentially decreasing dose if it does not improve.  Also given that his insulin dose has not been change for nearly two decades may benefit from slight adjustment in carb coverage to ensure that he is adequately covering meals. Finally, may benefit from diagnostic CGM and discussion with CDE about personal CGM.    PLAN:                                                      1. Move Basaglar/Lantus to morning.  2. Increase Humalog to 1 unit per 10 grams of carbohydrates.  3. Referral placed for diabetes education.  4. Provider patient with Patient materials for Celiac Disease/Diabetes    I spent 50 minutes with this patient today.  All changes were made via collaborative practice agreement with Jose Sood A copy of the visit note was provided to the patient's primary care provider.    Will follow up in 1 week.    The patient was given a summary of these recommendations as an after visit summary.     Jm Gilbert, LexusD  Medication Therapy Management Provider  Pager (voicemail): [phone]

## 2017-04-03 ENCOUNTER — TELEPHONE (OUTPATIENT)
Dept: EDUCATION SERVICES | Facility: CLINIC | Age: 27
End: 2017-04-03

## 2017-04-03 NOTE — TELEPHONE ENCOUNTER
Per referral and message from [facility], patient is interested in CGM. Left patient message with my call back number.     Kimberli Mcfadden RDN, INDER, CDE

## 2017-04-11 ENCOUNTER — APPOINTMENT (OUTPATIENT)
Dept: GENERAL RADIOLOGY | Facility: CLINIC | Age: 27
End: 2017-04-11
Attending: PHYSICIAN ASSISTANT
Payer: COMMERCIAL

## 2017-04-11 ENCOUNTER — HOSPITAL ENCOUNTER (EMERGENCY)
Facility: CLINIC | Age: 27
Discharge: HOME | End: 2017-04-12
Attending: PHYSICIAN ASSISTANT | Admitting: PHYSICIAN ASSISTANT
Payer: COMMERCIAL
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
## 2017-04-11 DIAGNOSIS — S92.315B OPEN NONDISPLACED FRACTURE OF FIRST METATARSAL BONE OF LEFT FOOT, INITIAL ENCOUNTER: ICD-10-CM

## 2017-04-11 DIAGNOSIS — S92.302B: ICD-10-CM

## 2017-04-11 DIAGNOSIS — S91.312A LACERATION OF LEFT FOOT, INITIAL ENCOUNTER: ICD-10-CM

## 2017-04-11 PROCEDURE — 90471 IMMUNIZATION ADMIN: CPT | Performed by: PHYSICIAN ASSISTANT

## 2017-04-11 PROCEDURE — 99284 EMERGENCY DEPT VISIT MOD MDM: CPT | Mod: 25 | Performed by: PHYSICIAN ASSISTANT

## 2017-04-11 PROCEDURE — 96365 THER/PROPH/DIAG IV INF INIT: CPT | Performed by: PHYSICIAN ASSISTANT

## 2017-04-11 PROCEDURE — 12042 INTMD RPR N-HF/GENIT2.6-7.5: CPT | Mod: Z6 | Performed by: PHYSICIAN ASSISTANT

## 2017-04-11 PROCEDURE — 90715 TDAP VACCINE 7 YRS/> IM: CPT | Performed by: PHYSICIAN ASSISTANT

## 2017-04-11 PROCEDURE — 96367 TX/PROPH/DG ADDL SEQ IV INF: CPT | Mod: 59 | Performed by: PHYSICIAN ASSISTANT

## 2017-04-11 PROCEDURE — S0020 INJECTION, BUPIVICAINE HYDRO: HCPCS | Performed by: PHYSICIAN ASSISTANT

## 2017-04-11 PROCEDURE — 12042 INTMD RPR N-HF/GENIT2.6-7.5: CPT | Performed by: PHYSICIAN ASSISTANT

## 2017-04-11 PROCEDURE — 25000125 ZZHC RX 250: Performed by: PHYSICIAN ASSISTANT

## 2017-04-11 PROCEDURE — 73630 X-RAY EXAM OF FOOT: CPT | Mod: TC,LT

## 2017-04-11 PROCEDURE — 25000128 H RX IP 250 OP 636: Performed by: PHYSICIAN ASSISTANT

## 2017-04-11 RX ORDER — CEFAZOLIN SODIUM 1 G/3ML
1 INJECTION, POWDER, FOR SOLUTION INTRAMUSCULAR; INTRAVENOUS ONCE
Status: COMPLETED | OUTPATIENT
Start: 2017-04-11 | End: 2017-04-11

## 2017-04-11 RX ORDER — LIDOCAINE 40 MG/G
CREAM TOPICAL
Status: DISCONTINUED | OUTPATIENT
Start: 2017-04-11 | End: 2017-04-12 | Stop reason: HOSPADM

## 2017-04-11 RX ORDER — BUPIVACAINE HYDROCHLORIDE 2.5 MG/ML
10 INJECTION, SOLUTION EPIDURAL; INFILTRATION; INTRACAUDAL ONCE
Status: COMPLETED | OUTPATIENT
Start: 2017-04-11 | End: 2017-04-11

## 2017-04-11 RX ORDER — CEPHALEXIN 500 MG/1
500 CAPSULE ORAL 4 TIMES DAILY
Qty: 40 CAPSULE | Refills: 0 | Status: SHIPPED | OUTPATIENT
Start: 2017-04-11 | End: 2017-04-21

## 2017-04-11 RX ADMIN — CLOSTRIDIUM TETANI TOXOID ANTIGEN (FORMALDEHYDE INACTIVATED), CORYNEBACTERIUM DIPHTHERIAE TOXOID ANTIGEN (FORMALDEHYDE INACTIVATED), BORDETELLA PERTUSSIS TOXOID ANTIGEN (GLUTARALDEHYDE INACTIVATED), BORDETELLA PERTUSSIS FILAMENTOUS HEMAGGLUTININ ANTIGEN (FORMALDEHYDE INACTIVATED), BORDETELLA PERTUSSIS PERTACTIN ANTIGEN, AND BORDETELLA PERTUSSIS FIMBRIAE 2/3 ANTIGEN 0.5 ML: 5; 2; 2.5; 5; 3; 5 INJECTION, SUSPENSION INTRAMUSCULAR at 21:09

## 2017-04-11 RX ADMIN — BUPIVACAINE HYDROCHLORIDE 25 MG: 2.5 INJECTION, SOLUTION EPIDURAL; INFILTRATION; INTRACAUDAL at 21:11

## 2017-04-11 RX ADMIN — GENTAMICIN SULFATE 500 MG: 40 INJECTION, SOLUTION INTRAMUSCULAR; INTRAVENOUS at 22:44

## 2017-04-11 RX ADMIN — CEFAZOLIN SODIUM 1 G: 1 INJECTION, POWDER, FOR SOLUTION INTRAMUSCULAR; INTRAVENOUS at 22:53

## 2017-04-11 NOTE — TELEPHONE ENCOUNTER
Called patient again to attempt to schedule appointment per request from [facility]. Left message with my call back number.     Kimberli Mcfadden RDN, LD, CDE

## 2017-04-11 NOTE — ED AVS SNAPSHOT
[facility] Emergency Department    [address] [place] [address]    Phone:  [phone]    Fax:  [phone]                                       Rafat Baird   MRN: [id number]    Department:  [facility] Emergency Department   Date of Visit:  4/11/2017           Patient Information     Date Of Birth          1990        Your diagnoses for this visit were:     Laceration of left foot, initial encounter     Open nondisplaced fracture of metatarsal bone of left foot, unspecified metatarsal, initial encounter        You were seen by Terrence Fish PA-C.      Follow-up Information     Follow up with [facility] Emergency Department.    Specialty:  EMERGENCY MEDICINE    Why:  As needed, If symptoms worsen    Contact information:    [address] [place] [address] [phone]    Additional information:    From [address]: Exit at [address] on [address] side of [place]. Turn right on [address]. Turn left at stoplight on [address]. [facility] will be in view two blocks ahead        Discharge Instructions       1.  Please change your dressing 2 times per day.  Use Bacitracin, Vaseline Non-stick Gauze, Gauze, and Conforming Bandage.  2.  Please keep your foot elevated at all times to help prevent swelling.  3.  Please start taking your antibiotic, Cephalexin, tomorrow morning.    4.  It is okay to use Ibuprofen 800 mg every 8 hours as needed for pain.   5.  Please see Dr. Starkey, the Orthopedist, for a consultation this Thursday.   He said that he would work you in.      Future Appointments        Provider Department Dept Phone Center    4/13/2017 1:40 PM Ramiro Starkey MD [facility] [phone] [facility]      24 Hour Appointment Hotline       To make an appointment at any [facility], call [phone] ([phone]). If you don't have a family doctor or clinic, we will help you find one. [facility]  clinics are conveniently located to serve the needs of you and your family.          ED Discharge Orders     ORTHO  REFERRAL       [facility] is referring you to the Orthopedic  Services at [facility].       The [facility] Representative will assist you in the coordination of your Orthopedic and Musculoskeletal Care as prescribed by your physician.    The  Representative will call you within 1 business day to help schedule your appointment, or you may contact the  Representative at:    All areas ~ [phone]     Type of Referral : [facility]       Timeframe requested: 2-3 days    Coverage of these services is subject to the terms and limitations of your health insurance plan.  Please call member services at your health plan with any benefit or coverage questions.      If X-rays, CT or MRI's have been performed, please contact the facility where they were done to arrange for , prior to your scheduled appointment.  Please bring this referral request to your appointment and present it to your specialist.                     Review of your medicines      START taking        Dose / Directions Last dose taken    cephALEXin 500 MG capsule   Commonly known as:  KEFLEX   Dose:  500 mg   Quantity:  40 capsule        Take 1 capsule (500 mg) by mouth 4 times daily for 10 days   Refills:  0          Our records show that you are taking the medicines listed below. If these are incorrect, please call your family doctor or clinic.        Dose / Directions Last dose taken    ACCU-CHEK ROBSON test strip   Quantity:  100 strip   Generic drug:  blood glucose monitoring        Use to test blood sugars 6-7 times daily.   Refills:  prn        * ACE/ARB NOT PRESCRIBED (INTENTIONAL)        by Other route continuous prn.   Refills:  0        * ASPIRIN NOT PRESCRIBED   Commonly known as:  INTENTIONAL        by Other route  continuous prn Reported on 3/2/2017   Refills:  0        IBUPROFEN PO   Dose:  600 mg        Take 600 mg by mouth   Refills:  0        insulin glargine 100 UNIT/ML injection   Dose:  45 Units   Quantity:  15 mL        Inject 45 Units Subcutaneous daily   Refills:  11        insulin lispro 100 UNIT/ML injection   Commonly known as:  HumaLOG KWIKpen   Quantity:  15 mL        INJECT SUBCUTANEOUSLY 3 TIMES DAILY (BEFORE MEALS) USE ACCORDING TO SLIDING SCALE.   Refills:  11        insulin pen needle 31G X 5 MM   Dose:  1 Box   Quantity:  100 each        1 Box See Admin Instructions Use 4 time(s) a day.   Refills:  6        * STATIN NOT PRESCRIBED (INTENTIONAL)   Quantity:  0 each        Reported on 3/2/2017   Refills:  0        * Notice:  This list has 3 medication(s) that are the same as other medications prescribed for you. Read the directions carefully, and ask your doctor or other care provider to review them with you.            Prescriptions were sent or printed at these locations (1 Prescription)                   [facility]   [address] [address]    Telephone:  [phone]   Fax:  [phone]   Hours:                  These medications are not ready yet because we are checking if your insurance will help you pay for them. Call your pharmacy to confirm that your medication is ready for pickup. It may take up to 24 hours for them to receive the prescription. If the prescription is not ready within 3 business days, please contact your clinic or your provider (1 of 1)         cephALEXin (KEFLEX) 500 MG capsule                Procedures and tests performed during your visit     X-ray lt Foot G/E 3 vws*      Orders Needing Specimen Collection     None      Pending Results     No orders found from 4/9/2017 to 4/12/2017.            Pending Culture Results     No orders found from 4/9/2017 to 4/12/2017.            Thank you for choosing [facility]       Thank you for  "choosing [facility] for your care. Our goal is always to provide you with excellent care. Hearing back from our patients is one way we can continue to improve our services. Please take a few minutes to complete the written survey that you may receive in the mail after you visit with us. Thank you!        [facility] Information     [facility] lets you send messages to your doctor, view your test results, renew your prescriptions, schedule appointments and more. To sign up, go to [email or web address] . Click on \"Log in\" on the left side of the screen, which will take you to the Welcome page. Then click on \"Sign up Now\" on the right side of the page.     You will be asked to enter the access code listed below, as well as some personal information. Please follow the directions to create your username and password.     Your access code is: [email or web address]  Expires: 2017  4:14 PM     Your access code will  in 90 days. If you need help or a new code, please call your [facility] clinic or [phone].        Care EveryWhere ID     This is your Care EveryWhere ID. This could be used by other organizations to access your [facility] medical records  [id number]        After Visit Summary       This is your record. Keep this with you and show to your community pharmacist(s) and doctor(s) at your next visit.                  "

## 2017-04-11 NOTE — ED AVS SNAPSHOT
[facility] Emergency Department    [address] [place] [address]    Phone:  [phone]    Fax:  [phone]                                       Rafat Baird   MRN: [id number]    Department:  [facility] Emergency Department   Date of Visit:  4/11/2017           After Visit Summary Signature Page     I have received my discharge instructions, and my questions have been answered. I have discussed any challenges I see with this plan with the nurse or doctor.    ..........................................................................................................................................  Patient/Patient Representative Signature      ..........................................................................................................................................  Patient Representative Print Name and Relationship to Patient    ..................................................               ................................................  Date                                            Time    ..........................................................................................................................................  Reviewed by Signature/Title    ...................................................              ..............................................  Date                                                            Time

## 2017-04-12 VITALS
SYSTOLIC BLOOD PRESSURE: 118 MMHG | WEIGHT: 160 LBS | TEMPERATURE: 97.1 F | DIASTOLIC BLOOD PRESSURE: 73 MMHG | BODY MASS INDEX: 22.4 KG/M2 | OXYGEN SATURATION: 99 % | HEIGHT: 71 IN | RESPIRATION RATE: 16 BRPM | HEART RATE: 71 BPM

## 2017-04-12 NOTE — DISCHARGE INSTRUCTIONS
1.  Please change your dressing 2 times per day.  Use Bacitracin, Vaseline Non-stick Gauze, Gauze, and Conforming Bandage.  2.  Please keep your foot elevated at all times to help prevent swelling.  3.  Please start taking your antibiotic, Cephalexin, tomorrow morning.    4.  It is okay to use Ibuprofen 800 mg every 8 hours as needed for pain.   5.  Please see Dr. Starkey, the Orthopedist, for a consultation this Thursday.   He said that he would work you in.
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
## 2017-04-13 ENCOUNTER — OFFICE VISIT (OUTPATIENT)
Dept: ORTHOPEDICS | Facility: CLINIC | Age: 27
End: 2017-04-13
Payer: COMMERCIAL

## 2017-04-13 VITALS — BODY MASS INDEX: 22.4 KG/M2 | WEIGHT: 160 LBS | HEIGHT: 71 IN | TEMPERATURE: 98.5 F

## 2017-04-13 DIAGNOSIS — S91.312A: Primary | ICD-10-CM

## 2017-04-13 PROCEDURE — 99203 OFFICE O/P NEW LOW 30 MIN: CPT | Performed by: ORTHOPAEDIC SURGERY

## 2017-04-13 ASSESSMENT — PAIN SCALES - GENERAL: PAINLEVEL: NO PAIN (0)
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
## 2017-04-13 NOTE — MR AVS SNAPSHOT
"              After Visit Summary   2017    Rafat Baird    MRN: [id number]           Patient Information     Date Of Birth          1990        Visit Information        Provider Department      2017 1:40 PM Ramiro Starkey MD [facility]         Follow-ups after your visit        Who to contact     If you have questions or need follow up information about today's clinic visit or your schedule please contact [facility] directly at [phone].  Normal or non-critical lab and imaging results will be communicated to you by [employer], letter or phone within 4 business days after the clinic has received the results. If you do not hear from us within 7 days, please contact the clinic through [employer] or phone. If you have a critical or abnormal lab result, we will notify you by phone as soon as possible.  Submit refill requests through [employer] or call your pharmacy and they will forward the refill request to us. Please allow 3 business days for your refill to be completed.          Additional Information About Your Visit        [employer] Information     [employer] lets you send messages to your doctor, view your test results, renew your prescriptions, schedule appointments and more. To sign up, go to [email or web address] . Click on \"Log in\" on the left side of the screen, which will take you to the Welcome page. Then click on \"Sign up Now\" on the right side of the page.     You will be asked to enter the access code listed below, as well as some personal information. Please follow the directions to create your username and password.     Your access code is: [email or web address]  Expires: 2017  4:14 PM     Your access code will  in 90 days. If you need help or a new code, please call your [facility] or [phone].        Care EveryWhere ID     This is your Care EveryWhere ID. This could be used by other organizations to access your [place] medical " "records  [id number]        Your Vitals Were     Temperature Height BMI (Body Mass Index)             98.5  F (36.9  C) (Temporal) 1.803 m (5' 11\") 22.32 kg/m2          Blood Pressure from Last 3 Encounters:   04/12/17 118/73   03/31/17 122/72   03/30/17 120/60    Weight from Last 3 Encounters:   04/13/17 72.6 kg (160 lb)   04/11/17 72.6 kg (160 lb)   03/31/17 72.9 kg (160 lb 12.8 oz)              Today, you had the following     No orders found for display       Primary Care Provider Office Phone # Fax #    Jose Sood [phone]       [facility] [address] [place] [address]        Thank you!     Thank you for choosing [facility]  for your care. Our goal is always to provide you with excellent care. Hearing back from our patients is one way we can continue to improve our services. Please take a few minutes to complete the written survey that you may receive in the mail after your visit with us. Thank you!             Your Updated Medication List - Protect others around you: Learn how to safely use, store and throw away your medicines at www.disposemymeds.org.          This list is accurate as of: 4/13/17  1:58 PM.  Always use your most recent med list.                   Brand Name Dispense Instructions for use    ACCU-CHEK ROBSON test strip   Generic drug:  blood glucose monitoring     100 strip    Use to test blood sugars 6-7 times daily.       * ACE/ARB NOT PRESCRIBED (INTENTIONAL)      by Other route continuous prn.       * ASPIRIN NOT PRESCRIBED    INTENTIONAL     by Other route continuous prn Reported on 3/2/2017       cephALEXin 500 MG capsule    KEFLEX    40 capsule    Take 1 capsule (500 mg) by mouth 4 times daily for 10 days       IBUPROFEN PO      Take 600 mg by mouth       insulin glargine 100 UNIT/ML injection     15 mL    Inject 45 Units Subcutaneous daily       insulin lispro 100 UNIT/ML injection    HumaLOG KWIKpen    15 mL    INJECT " SUBCUTANEOUSLY 3 TIMES DAILY (BEFORE MEALS) USE ACCORDING TO SLIDING SCALE.       insulin pen needle 31G X 5 MM     100 each    1 Box See Admin Instructions Use 4 time(s) a day.       * STATIN NOT PRESCRIBED (INTENTIONAL)     0 each    Reported on 3/2/2017       * Notice:  This list has 3 medication(s) that are the same as other medications prescribed for you. Read the directions carefully, and ask your doctor or other care provider to review them with you.

## 2017-04-13 NOTE — NURSING NOTE
"Chief Complaint   Patient presents with     Consult     Left foot laceration and Fracture       Initial Temp 98.5  F (36.9  C) (Temporal)  Ht 1.803 m (5' 11\")  Wt 72.6 kg (160 lb)  BMI 22.32 kg/m2 Estimated body mass index is 22.32 kg/(m^2) as calculated from the following:    Height as of this encounter: 1.803 m (5' 11\").    Weight as of this encounter: 72.6 kg (160 lb).  Medication Reconciliation: complete   DAJUAN Stratton  "

## 2017-04-20 ENCOUNTER — OFFICE VISIT (OUTPATIENT)
Dept: ORTHOPEDICS | Facility: CLINIC | Age: 27
End: 2017-04-20
Payer: COMMERCIAL

## 2017-04-20 VITALS — HEIGHT: 71 IN | TEMPERATURE: 98.5 F

## 2017-04-20 DIAGNOSIS — S92.902D: ICD-10-CM

## 2017-04-20 DIAGNOSIS — E11.40: ICD-10-CM

## 2017-04-20 DIAGNOSIS — S91.312A: Primary | ICD-10-CM

## 2017-04-20 PROCEDURE — 99213 OFFICE O/P EST LOW 20 MIN: CPT | Performed by: ORTHOPAEDIC SURGERY

## 2017-04-20 ASSESSMENT — PAIN SCALES - GENERAL: PAINLEVEL: NO PAIN (0)

## 2017-04-20 NOTE — NURSING NOTE
"Chief Complaint   Patient presents with     RECHECK     Left foot laceration and Fracture.  Onset: 4/11/17 (s/p 9 days) Symptoms brought on by using a chainsaw when he nicked his foot.       Initial Temp 98.5  F (36.9  C) (Temporal)  Ht 1.803 m (5' 11\") Estimated body mass index is 22.32 kg/(m^2) as calculated from the following:    Height as of 4/13/17: 1.803 m (5' 11\").    Weight as of 4/13/17: 72.6 kg (160 lb).  Medication Reconciliation: complete   DAJUAN Stratton  "

## 2017-04-20 NOTE — MR AVS SNAPSHOT
"              After Visit Summary   4/20/2017    Rafat Baird    MRN: [id number]           Patient Information     Date Of Birth          1990        Visit Information        Provider Department      4/20/2017 10:10 AM Ramiro Starkey MD [facility]         Follow-ups after your visit        Your next 10 appointments already scheduled     Apr 20, 2017 10:10 AM CDT   Return Visit with Ramiro Starkey MD   [facility] ([facility])    [address] [place] [address]   [phone]              Who to contact     If you have questions or need follow up information about today's clinic visit or your schedule please contact [facility] directly at [phone].  Normal or non-critical lab and imaging results will be communicated to you by MyChart, letter or phone within 4 business days after the clinic has received the results. If you do not hear from us within 7 days, please contact the clinic through MyChart or phone. If you have a critical or abnormal lab result, we will notify you by phone as soon as possible.  Submit refill requests through [employer] or call your pharmacy and they will forward the refill request to us. Please allow 3 business days for your refill to be completed.          Additional Information About Your Visit        [employer] Information     [employer] lets you send messages to your doctor, view your test results, renew your prescriptions, schedule appointments and more. To sign up, go to [email or web address] . Click on \"Log in\" on the left side of the screen, which will take you to the Welcome page. Then click on \"Sign up Now\" on the right side of the page.     You will be asked to enter the access code listed below, as well as some personal information. Please follow the directions to create your username and password.     Your access code is: [email or web address]  Expires: 5/14/2017  4:14 PM     Your access code will " " in 90 days. If you need help or a new code, please call your [place] clinic or [phone].        Care EveryWhere ID     This is your Care EveryWhere ID. This could be used by other organizations to access your [place] medical records  [id number]        Your Vitals Were     Temperature Height                98.5  F (36.9  C) (Temporal) 1.803 m (5' 11\")           Blood Pressure from Last 3 Encounters:   17 118/73   17 122/72   17 120/60    Weight from Last 3 Encounters:   17 72.6 kg (160 lb)   17 72.6 kg (160 lb)   17 72.9 kg (160 lb 12.8 oz)              Today, you had the following     No orders found for display       Primary Care Provider Office Phone # Fax #    Jose Sood [phone]       [facility] [address] [place] [address]        Thank you!     Thank you for choosing [facility]  for your care. Our goal is always to provide you with excellent care. Hearing back from our patients is one way we can continue to improve our services. Please take a few minutes to complete the written survey that you may receive in the mail after your visit with us. Thank you!             Your Updated Medication List - Protect others around you: Learn how to safely use, store and throw away your medicines at www.disposemymeds.org.          This list is accurate as of: 17  9:54 AM.  Always use your most recent med list.                   Brand Name Dispense Instructions for use    ACCU-CHEK ROBSON test strip   Generic drug:  blood glucose monitoring     100 strip    Use to test blood sugars 6-7 times daily.       * ACE/ARB NOT PRESCRIBED (INTENTIONAL)      by Other route continuous prn.       * ASPIRIN NOT PRESCRIBED    INTENTIONAL     by Other route continuous prn Reported on 3/2/2017       cephALEXin 500 MG capsule    KEFLEX    40 capsule    Take 1 capsule (500 mg) by mouth 4 times daily for 10 days       " IBUPROFEN PO      Take 600 mg by mouth       insulin glargine 100 UNIT/ML injection     15 mL    Inject 45 Units Subcutaneous daily       insulin lispro 100 UNIT/ML injection    HumaLOG KWIKpen    15 mL    INJECT SUBCUTANEOUSLY 3 TIMES DAILY (BEFORE MEALS) USE ACCORDING TO SLIDING SCALE.       insulin pen needle 31G X 5 MM     100 each    1 Box See Admin Instructions Use 4 time(s) a day.       * STATIN NOT PRESCRIBED (INTENTIONAL)     0 each    Reported on 3/2/2017       * Notice:  This list has 3 medication(s) that are the same as other medications prescribed for you. Read the directions carefully, and ask your doctor or other care provider to review them with you.

## 2017-04-20 NOTE — PROGRESS NOTES
"HISTORY OF PRESENT ILLNESS:    Rafat Baird is a 26 year old male who is seen in follow up for   Chief Complaint   Patient presents with     RECHECK     Left foot laceration and Fracture.  Onset: 4/11/17 (s/p 9 days) Symptoms brought on by using a chainsaw when he nicked his foot.   Present symptoms: No significant complaints.  Treatments tried to this point: Frequent dressing changes, Ace wrap  Patient evaluation done with Dr. Starkey    Physical Exam:  Vitals: Temp 98.5  F (36.9  C) (Temporal)  Ht 1.803 m (5' 11\")  BMI= There is no height or weight on file to calculate BMI.  Constitutional: healthy, alert and no acute distress   Psychiatric: mentation appears normal and affect normal/bright  NEURO: no focal deficits  SKIN: no excoriation or erythema. No signs of infection.  JOINT/EXTREMITIES:  Affected extremity pulses are easily palpable.  Left Foot Exam: Inspection: Sutures remain intact. Wound remains closed. There is mild drainage from the wound that is serosanguineous. There is some mild redness. There is some surrounding bruising.  GAIT: antalgic     ASSESSMENT:    Chief Complaint   Patient presents with     RECHECK     Left foot laceration and Fracture.  Onset: 4/11/17 (s/p 9 days) Symptoms brought on by using a chainsaw when he nicked his foot.       ICD-10-CM    1. Laceration of foot, left, complicated, initial encounter S91.312A    2. Open fracture of bone of foot affected by diabetic neuropathy, left, with routine healing, subsequent encounter (H) S92.902D     E11.40     patient with laceration of the medial side of his left foot affecting the first metatarsal.  Wound looks appropriate for healing.    Plan:   Patient given lecture about decreasing use of smoking as smoking along with his diabetes impedes his wound healing.  Patient to continue local wound care and keeping wound clean  Return to clinic 4 days at which time wound will be reevaluated and sutures removed    Scribed by  Maame Suarez PA-C "   4/20/2017  10:10 AM      I attest I have seen and evaluated the patient.  I agree with above impression and plan.    Ramiro Starkey MD

## 2017-04-20 NOTE — LETTER
"  4/20/2017       RE: Rafat Baird  [id number]    [facility] [address]           Dear Colleague,    Thank you for referring your patient, Rafat Baird, to the [facility]. Please see a copy of my visit note below.    HISTORY OF PRESENT ILLNESS:    Rafat Baird is a 26 year old male who is seen in follow up for   Chief Complaint   Patient presents with     RECHECK     Left foot laceration and Fracture.  Onset: 4/11/17 (s/p 9 days) Symptoms brought on by using a chainsaw when he nicked his foot.   Present symptoms: No significant complaints.  Treatments tried to this point: Frequent dressing changes, Ace wrap  Patient evaluation done with Dr. Starkey    Physical Exam:  Vitals: Temp 98.5  F (36.9  C) (Temporal)  Ht 1.803 m (5' 11\")  BMI= There is no height or weight on file to calculate BMI.  Constitutional: healthy, alert and no acute distress   Psychiatric: mentation appears normal and affect normal/bright  NEURO: no focal deficits  SKIN: no excoriation or erythema. No signs of infection.  JOINT/EXTREMITIES:  Affected extremity pulses are easily palpable.  Left Foot Exam: Inspection: Sutures remain intact. Wound remains closed. There is mild drainage from the wound that is serosanguineous. There is some mild redness. There is some surrounding bruising.  GAIT: antalgic     ASSESSMENT:    Chief Complaint   Patient presents with     RECHECK     Left foot laceration and Fracture.  Onset: 4/11/17 (s/p 9 days) Symptoms brought on by using a chainsaw when he nicked his foot.       ICD-10-CM    1. Laceration of foot, left, complicated, initial encounter S91.312A    2. Open fracture of bone of foot affected by diabetic neuropathy, left, with routine healing, subsequent encounter (H) S92.902D     E11.40     patient with laceration of the medial side of his left foot affecting the first metatarsal.  Wound looks appropriate for healing.    Plan:   Patient given lecture about decreasing use of smoking as smoking " along with his diabetes impedes his wound healing.  Patient to continue local wound care and keeping wound clean  Return to clinic 4 days at which time wound will be reevaluated and sutures removed    Scribed by  Maame Suarez PA-C   4/20/2017  10:10 AM      I attest I have seen and evaluated the patient.  I agree with above impression and plan.    Ramiro Starkey MD    Again, thank you for allowing me to participate in the care of your patient.        Sincerely,              Ramiro Starkey MD

## 2017-04-24 ENCOUNTER — OFFICE VISIT (OUTPATIENT)
Dept: ORTHOPEDICS | Facility: CLINIC | Age: 27
End: 2017-04-24
Payer: COMMERCIAL

## 2017-04-24 VITALS — TEMPERATURE: 98.2 F | HEIGHT: 71 IN

## 2017-04-24 DIAGNOSIS — S91.312A: ICD-10-CM

## 2017-04-24 DIAGNOSIS — S92.902D: Primary | ICD-10-CM

## 2017-04-24 DIAGNOSIS — E11.40: Primary | ICD-10-CM

## 2017-04-24 PROCEDURE — 99212 OFFICE O/P EST SF 10 MIN: CPT | Performed by: ORTHOPAEDIC SURGERY

## 2017-04-24 ASSESSMENT — PAIN SCALES - GENERAL: PAINLEVEL: NO PAIN (0)

## 2017-04-24 NOTE — MR AVS SNAPSHOT
"              After Visit Summary   2017    Rafat Baird    MRN: [id number]           Patient Information     Date Of Birth          1990        Visit Information        Provider Department      2017 1:30 PM Ramiro Starkey MD [facility]         Follow-ups after your visit        Who to contact     If you have questions or need follow up information about today's clinic visit or your schedule please contact [facility] directly at [phone].  Normal or non-critical lab and imaging results will be communicated to you by Accessbiohart, letter or phone within 4 business days after the clinic has received the results. If you do not hear from us within 7 days, please contact the clinic through Accessbiohart or phone. If you have a critical or abnormal lab result, we will notify you by phone as soon as possible.  Submit refill requests through Clinicient or call your pharmacy and they will forward the refill request to us. Please allow 3 business days for your refill to be completed.          Additional Information About Your Visit        Clinicient Information     Clinicient lets you send messages to your doctor, view your test results, renew your prescriptions, schedule appointments and more. To sign up, go to [email or web address] . Click on \"Log in\" on the left side of the screen, which will take you to the Welcome page. Then click on \"Sign up Now\" on the right side of the page.     You will be asked to enter the access code listed below, as well as some personal information. Please follow the directions to create your username and password.     Your access code is: [email or web address]  Expires: 2017  4:14 PM     Your access code will  in 90 days. If you need help or a new code, please call your [facility]) or [phone].        Care EveryWhere ID     This is your Care EveryWhere ID. This could be used by other organizations to access your [facility] medical " "records  [id number]        Your Vitals Were     Temperature Height                98.2  F (36.8  C) (Temporal) 1.803 m (5' 11\")           Blood Pressure from Last 3 Encounters:   04/12/17 118/73   03/31/17 122/72   03/30/17 120/60    Weight from Last 3 Encounters:   04/13/17 72.6 kg (160 lb)   04/11/17 72.6 kg (160 lb)   03/31/17 72.9 kg (160 lb 12.8 oz)              Today, you had the following     No orders found for display       Primary Care Provider Office Phone # Fax #    Jose Sood [phone]       [facility] [address] [place] [address]        Thank you!     Thank you for choosing [facility]  for your care. Our goal is always to provide you with excellent care. Hearing back from our patients is one way we can continue to improve our services. Please take a few minutes to complete the written survey that you may receive in the mail after your visit with us. Thank you!             Your Updated Medication List - Protect others around you: Learn how to safely use, store and throw away your medicines at www.disposemymeds.org.          This list is accurate as of: 4/24/17  1:45 PM.  Always use your most recent med list.                   Brand Name Dispense Instructions for use    ACCU-CHEK ROBSON test strip   Generic drug:  blood glucose monitoring     100 strip    Use to test blood sugars 6-7 times daily.       * ACE/ARB NOT PRESCRIBED (INTENTIONAL)      by Other route continuous prn.       * ASPIRIN NOT PRESCRIBED    INTENTIONAL     by Other route continuous prn Reported on 3/2/2017       IBUPROFEN PO      Take 600 mg by mouth       insulin glargine 100 UNIT/ML injection     15 mL    Inject 45 Units Subcutaneous daily       insulin lispro 100 UNIT/ML injection    HumaLOG KWIKpen    15 mL    INJECT SUBCUTANEOUSLY 3 TIMES DAILY (BEFORE MEALS) USE ACCORDING TO SLIDING SCALE.       insulin pen needle 31G X 5 MM     100 each    1 Box See Admin " Instructions Use 4 time(s) a day.       * STATIN NOT PRESCRIBED (INTENTIONAL)     0 each    Reported on 3/2/2017       * Notice:  This list has 3 medication(s) that are the same as other medications prescribed for you. Read the directions carefully, and ask your doctor or other care provider to review them with you.

## 2017-04-24 NOTE — NURSING NOTE
"Chief Complaint   Patient presents with     RECHECK     Left foot laceration and Fracture. Onset: 4/11/17 (s/p 13 days) Symptoms brought on by using a chainsaw when he nicked his foot.       Initial Temp 98.2  F (36.8  C) (Temporal)  Ht 1.803 m (5' 11\") Estimated body mass index is 22.32 kg/(m^2) as calculated from the following:    Height as of 4/13/17: 1.803 m (5' 11\").    Weight as of 4/13/17: 72.6 kg (160 lb).  Medication Reconciliation: complete   DAJUAN Stratton  "

## 2017-04-24 NOTE — LETTER
"  4/24/2017       RE: Rafat Baird  [id number]    [facility] [address]           Dear Colleague,    Thank you for referring your patient, Rafat Baird, to the [facility]. Please see a copy of my visit note below.    .    Office Visit-Follow up  HISTORY OF PRESENT ILLNESS:    Rafat Baird is a 26 year old male who is seen in follow up for   Chief Complaint   Patient presents with     RECHECK     Left foot laceration and Fracture. Onset: 4/11/17 (s/p 13 days) Symptoms brought on by using a chainsaw when he nicked his foot.         Present symptoms: nno new complaints.  Treatments tried to this point: He has continued to [facility]. He has completed the antibiotics just yesterday. He has tried to decrease his smoking    REVIEW OF SYSTEMS  General: negative for, night sweats, dizziness, fatigue  Resp: No shortness of breath and no cough  CV: negative for chest pain, syncope or near-syncope  GI: negative for nausea, vomiting and diarrhea  : negative for dysuria and hematuria  Musculoskeletal: as above  Neurologic: negative for syncope   Hematologic: negative for bleeding disorder    Physical Exam:  Vitals: Temp 98.2  F (36.8  C) (Temporal)  Ht 1.803 m (5' 11\")  BMI= There is no height or weight on file to calculate BMI.  Constitutional: healthy, alert and no acute distress   Psychiatric: mentation appears normal and affect normal/bright  NEURO: no focal deficits  SKIN: no excoriation or erythema. No signs of infection.    GAIT: Continues to use crutches.    JOINT/EXTREMITIES:     The wound today appears to have improved. It is not as red. I do not detect any drainage. We did remove the sutures today.    IMAGING INTERPRETATION: No new x-rays taken.       Impression:      ICD-10-CM    1. Open fracture of bone of foot affected by diabetic neuropathy, left, with routine healing, subsequent encounter (H) S92.902D     E11.40    2. Laceration of foot, left, complicated, initial encounter S91.312A  "       Complicated wound from chainsaw. At 2 weeks out it appears to be improving.    Plan:   The above was reviewed with Rafat.     He will continue the same approach. I did suggest that he begin putting some weight on the extremity.  He is educated as to what to look for such as recurring inflammation.  We will clinically reassess this in about 10 days.      Return to clinic 10, days    Ramiro Starkey MD    Again, thank you for allowing me to participate in the care of your patient.        Sincerely,              Ramiro Starkey MD

## 2017-04-24 NOTE — PROGRESS NOTES
"Office Visit-Follow up  HISTORY OF PRESENT ILLNESS:    Rafat Baird is a 26 year old male who is seen in follow up for   Chief Complaint   Patient presents with     RECHECK     Left foot laceration and Fracture. Onset: 4/11/17 (s/p 13 days) Symptoms brought on by using a chainsaw when he nicked his foot.         Present symptoms: nno new complaints.  Treatments tried to this point: He has continued to [facility] care. He has completed the antibiotics just yesterday. He has tried to decrease his smoking    REVIEW OF SYSTEMS  General: negative for, night sweats, dizziness, fatigue  Resp: No shortness of breath and no cough  CV: negative for chest pain, syncope or near-syncope  GI: negative for nausea, vomiting and diarrhea  : negative for dysuria and hematuria  Musculoskeletal: as above  Neurologic: negative for syncope   Hematologic: negative for bleeding disorder    Physical Exam:  Vitals: Temp 98.2  F (36.8  C) (Temporal)  Ht 1.803 m (5' 11\")  BMI= There is no height or weight on file to calculate BMI.  Constitutional: healthy, alert and no acute distress   Psychiatric: mentation appears normal and affect normal/bright  NEURO: no focal deficits  SKIN: no excoriation or erythema. No signs of infection.    GAIT: Continues to use crutches.    JOINT/EXTREMITIES:     The wound today appears to have improved. It is not as red. I do not detect any drainage. We did remove the sutures today.    IMAGING INTERPRETATION: No new x-rays taken.       Impression:      ICD-10-CM    1. Open fracture of bone of foot affected by diabetic neuropathy, left, with routine healing, subsequent encounter (H) S92.902D     E11.40    2. Laceration of foot, left, complicated, initial encounter S91.312A        Complicated wound from chainsaw. At 2 weeks out it appears to be improving.    Plan:   The above was reviewed with Rafat.     He will continue the same approach. I did suggest that he begin putting some weight on the extremity.  He is " educated as to what to look for such as recurring inflammation.  We will clinically reassess this in about 10 days.      Return to clinic 10, days    Ramiro Starkey MD

## 2017-05-04 ENCOUNTER — OFFICE VISIT (OUTPATIENT)
Dept: ORTHOPEDICS | Facility: CLINIC | Age: 27
End: 2017-05-04
Payer: COMMERCIAL

## 2017-05-04 VITALS — WEIGHT: 165 LBS | BODY MASS INDEX: 23.1 KG/M2 | HEIGHT: 71 IN | TEMPERATURE: 96.7 F

## 2017-05-04 DIAGNOSIS — S91.312A: ICD-10-CM

## 2017-05-04 DIAGNOSIS — E11.40: Primary | ICD-10-CM

## 2017-05-04 DIAGNOSIS — E10.43 TYPE 1 DIABETES MELLITUS WITH PERIPHERAL AUTONOMIC NEUROPATHY (H): ICD-10-CM

## 2017-05-04 DIAGNOSIS — S92.902D: Primary | ICD-10-CM

## 2017-05-04 PROCEDURE — 99213 OFFICE O/P EST LOW 20 MIN: CPT | Performed by: ORTHOPAEDIC SURGERY

## 2017-05-04 ASSESSMENT — PAIN SCALES - GENERAL: PAINLEVEL: NO PAIN (0)

## 2017-05-04 NOTE — NURSING NOTE
"Chief Complaint   Patient presents with     RECHECK     Left foot laceration and Fracture. Onset: 4/11/17 (s/p 3 weeks) Symptoms brought on by using a chainsaw when he nicked his foot.       Initial Temp 96.7  F (35.9  C) (Temporal)  Ht 1.803 m (5' 11\")  Wt 74.8 kg (165 lb)  BMI 23.01 kg/m2 Estimated body mass index is 23.01 kg/(m^2) as calculated from the following:    Height as of this encounter: 1.803 m (5' 11\").    Weight as of this encounter: 74.8 kg (165 lb).  Medication Reconciliation: complete   DAJUAN Stratton  "

## 2017-05-04 NOTE — MR AVS SNAPSHOT
"              After Visit Summary   2017    Rafat Baird    MRN: [id number]           Patient Information     Date Of Birth          1990        Visit Information        Provider Department      2017 9:00 AM Ramiro Starkey MD [facility]         Follow-ups after your visit        Who to contact     If you have questions or need follow up information about today's clinic visit or your schedule please contact [facility] directly at [phone].  Normal or non-critical lab and imaging results will be communicated to you by TRUE linkswearhart, letter or phone within 4 business days after the clinic has received the results. If you do not hear from us within 7 days, please contact the clinic through TRUE linkswearhart or phone. If you have a critical or abnormal lab result, we will notify you by phone as soon as possible.  Submit refill requests through [employer] or call your pharmacy and they will forward the refill request to us. Please allow 3 business days for your refill to be completed.          Additional Information About Your Visit        Loci Controls Information     L[employer] lets you send messages to your doctor, view your test results, renew your prescriptions, schedule appointments and more. To sign up, go to [email or web address] . Click on \"Log in\" on the left side of the screen, which will take you to the Welcome page. Then click on \"Sign up Now\" on the right side of the page.     You will be asked to enter the access code listed below, as well as some personal information. Please follow the directions to create your username and password.     Your access code is: [email or web address]  Expires: 2017  4:14 PM     Your access code will  in 90 days. If you need help or a new code, please call your [facility] or [phone].        Care EveryWhere ID     This is your Care EveryWhere ID. This could be used by other organizations to access your [place] medical " "records  [id number]        Your Vitals Were     Temperature Height BMI (Body Mass Index)             96.7  F (35.9  C) (Temporal) 1.803 m (5' 11\") 23.01 kg/m2          Blood Pressure from Last 3 Encounters:   04/12/17 118/73   03/31/17 122/72   03/30/17 120/60    Weight from Last 3 Encounters:   05/04/17 74.8 kg (165 lb)   04/13/17 72.6 kg (160 lb)   04/11/17 72.6 kg (160 lb)              Today, you had the following     No orders found for display       Primary Care Provider Office Phone # Fax #    Jose Sood [phone]       [facility] [address] [place] [address]        Thank you!     Thank you for choosing [facility]  for your care. Our goal is always to provide you with excellent care. Hearing back from our patients is one way we can continue to improve our services. Please take a few minutes to complete the written survey that you may receive in the mail after your visit with us. Thank you!             Your Updated Medication List - Protect others around you: Learn how to safely use, store and throw away your medicines at www.disposemymeds.org.          This list is accurate as of: 5/4/17  9:06 AM.  Always use your most recent med list.                   Brand Name Dispense Instructions for use    ACCU-CHEK ROBSON test strip   Generic drug:  blood glucose monitoring     100 strip    Use to test blood sugars 6-7 times daily.       * ACE/ARB NOT PRESCRIBED (INTENTIONAL)      by Other route continuous prn.       * ASPIRIN NOT PRESCRIBED    INTENTIONAL     by Other route continuous prn Reported on 3/2/2017       IBUPROFEN PO      Take 600 mg by mouth       insulin glargine 100 UNIT/ML injection     15 mL    Inject 45 Units Subcutaneous daily       insulin lispro 100 UNIT/ML injection    HumaLOG KWIKpen    15 mL    INJECT SUBCUTANEOUSLY 3 TIMES DAILY (BEFORE MEALS) USE ACCORDING TO SLIDING SCALE.       insulin pen needle 31G X 5 MM     100 each "    1 Box See Admin Instructions Use 4 time(s) a day.       * STATIN NOT PRESCRIBED (INTENTIONAL)     0 each    Reported on 3/2/2017       * Notice:  This list has 3 medication(s) that are the same as other medications prescribed for you. Read the directions carefully, and ask your doctor or other care provider to review them with you.

## 2017-05-04 NOTE — LETTER
"  5/4/2017       RE: Rafat Baird  [id number]    [facility] [address]           Dear Colleague,    Thank you for referring your patient, Rafat Baird, to the [facility]. Please see a copy of my visit note below.    HISTORY OF PRESENT ILLNESS:    Rafat Baird is a 27 year old male who is seen in follow up for   Chief Complaint   Patient presents with     RECHECK     Left foot laceration and Fracture. Onset: 4/11/17 (s/p 3 weeks) Symptoms brought on by using a chainsaw when he nicked his foot.   Present symptoms: Patient reports he has been walking on a limited basis on his left foot. He offers continued opportunities for elevation and ice. 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 He feels his wound is coming along nicely.  Treatments tried to this point: As above  Patient evaluation done with Dr. Starkey    Physical Exam:  Vitals: Temp 96.7  F (35.9  C) (Temporal)  Ht 1.803 m (5' 11\")  Wt 74.8 kg (165 lb)  BMI 23.01 kg/m2  BMI= Body mass index is 23.01 kg/(m^2).  Constitutional: healthy, alert and no acute distress   Psychiatric: mentation appears normal and affect normal/bright  NEURO: no focal deficits  SKIN: no excoriation or erythema. No signs of infection.  JOINT/EXTREMITIES:  Affected extremity pulses are easily palpable.  Left Foot Exam: Inspection: Patient with a very mild amount of swelling near the laceration site. There is very little redness. The laceration site now appears just superficial with some weeping.  Tender: Minimal tenderness near the laceration  Patient maintains his range of motion.   GAIT: Mildly antalgic     ASSESSMENT:    Chief Complaint   Patient presents with     RECHECK     Left foot laceration and Fracture. Onset: 4/11/17 (s/p 3 weeks) Symptoms brought on by using a chainsaw when he nicked his foot.       ICD-10-CM    1. Open fracture of bone of foot affected by diabetic neuropathy, left, with routine healing, subsequent encounter (H) S92.902D     E11.40  "   2. Type 1 diabetes mellitus with peripheral autonomic neuropathy (H) E10.43    3. Laceration of foot, left, complicated, initial encounter S91.312A      Patient with left foot laceration from chainsaw injury that did extend down into the bone. He was on antibiotics which have now finished. Patient doing only localized care. His wound appears to be healing very nicely. He has now been weightbearing    Plan:   We will have patient begin transitioning back to work to 4 hours per day.  Patient reports his work is very flexible sole he would not need a note. He is asked to call our clinic if a note is needed. Patient states he has been working as they have been allowing him to work from home. He works as a  and .  Patient will continue wound care with antibiotic cream/ointment and compression with an Ace wrap. A new Ace wrap was provided to him  Return to clinic 3 weeks only if there is concerns.    Scribed by  Maame Suarez PA-C   5/4/2017  10:05 AM      I attest I have seen and evaluated the patient.  I agree with above impression and plan.    Ramiro Starkey MD    Again, thank you for allowing me to participate in the care of your patient.        Sincerely,              Ramiro Starkey MD

## 2017-05-04 NOTE — PROGRESS NOTES
"HISTORY OF PRESENT ILLNESS:    Rafat Baird is a 27 year old male who is seen in follow up for   Chief Complaint   Patient presents with     RECHECK     Left foot laceration and Fracture. Onset: 4/11/17 (s/p 3 weeks) Symptoms brought on by using a chainsaw when he nicked his foot.   Present symptoms: Patient reports he has been walking on a limited basis on his left foot. He offers continued opportunities for elevation and ice. 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 He feels his wound is coming along nicely.  Treatments tried to this point: As above  Patient evaluation done with Dr. Starkey    Physical Exam:  Vitals: Temp 96.7  F (35.9  C) (Temporal)  Ht 1.803 m (5' 11\")  Wt 74.8 kg (165 lb)  BMI 23.01 kg/m2  BMI= Body mass index is 23.01 kg/(m^2).  Constitutional: healthy, alert and no acute distress   Psychiatric: mentation appears normal and affect normal/bright  NEURO: no focal deficits  SKIN: no excoriation or erythema. No signs of infection.  JOINT/EXTREMITIES:  Affected extremity pulses are easily palpable.  Left Foot Exam: Inspection: Patient with a very mild amount of swelling near the laceration site. There is very little redness. The laceration site now appears just superficial with some weeping.  Tender: Minimal tenderness near the laceration  Patient maintains his range of motion.   GAIT: Mildly antalgic     ASSESSMENT:    Chief Complaint   Patient presents with     RECHECK     Left foot laceration and Fracture. Onset: 4/11/17 (s/p 3 weeks) Symptoms brought on by using a chainsaw when he nicked his foot.       ICD-10-CM    1. Open fracture of bone of foot affected by diabetic neuropathy, left, with routine healing, subsequent encounter (H) S92.902D     E11.40    2. Type 1 diabetes mellitus with peripheral autonomic neuropathy (H) E10.43    3. Laceration of foot, left, complicated, initial encounter S91.312A      Patient with left foot laceration from chainsaw injury that did extend down " into the bone. He was on antibiotics which have now finished. Patient doing only localized care. His wound appears to be healing very nicely. He has now been weightbearing    Plan:   We will have patient begin transitioning back to work to 4 hours per day.  Patient reports his work is very flexible sole he would not need a note. He is asked to call our clinic if a note is needed. Patient states he has been working as they have been allowing him to work from home. He works as a  and .  Patient will continue wound care with antibiotic cream/ointment and compression with an Ace wrap. A new Ace wrap was provided to him  Return to clinic 3 weeks only if there is concerns.    Scribed by  Maame Suarez PA-C   5/4/2017  10:05 AM      I attest I have seen and evaluated the patient.  I agree with above impression and plan.    Ramiro Starkey MD

## 2017-07-25 ENCOUNTER — OFFICE VISIT (OUTPATIENT)
Dept: FAMILY MEDICINE | Facility: OTHER | Age: 27
End: 2017-07-25
Payer: COMMERCIAL

## 2017-07-25 VITALS
TEMPERATURE: 98.6 F | WEIGHT: 163 LBS | HEART RATE: 88 BPM | BODY MASS INDEX: 22.82 KG/M2 | HEIGHT: 71 IN | RESPIRATION RATE: 16 BRPM | DIASTOLIC BLOOD PRESSURE: 70 MMHG | SYSTOLIC BLOOD PRESSURE: 122 MMHG

## 2017-07-25 DIAGNOSIS — L02.214 CUTANEOUS ABSCESS OF GROIN: Primary | ICD-10-CM

## 2017-07-25 DIAGNOSIS — E10.43 TYPE 1 DIABETES MELLITUS WITH PERIPHERAL AUTONOMIC NEUROPATHY (H): ICD-10-CM

## 2017-07-25 PROCEDURE — 99213 OFFICE O/P EST LOW 20 MIN: CPT | Performed by: INTERNAL MEDICINE

## 2017-07-25 RX ORDER — AMOXICILLIN AND CLAVULANATE POTASSIUM 500; 125 MG/1; MG/1
1 TABLET, FILM COATED ORAL 3 TIMES DAILY
Qty: 21 TABLET | Refills: 0 | Status: SHIPPED | OUTPATIENT
Start: 2017-07-25 | End: 2017-10-16

## 2017-07-25 ASSESSMENT — PAIN SCALES - GENERAL: PAINLEVEL: SEVERE PAIN (6)

## 2017-07-25 NOTE — MR AVS SNAPSHOT
"              After Visit Summary   2017    Rafat Baird    MRN: [id number]           Patient Information     Date Of Birth          1990        Visit Information        Provider Department      2017 9:20 AM Param Reyes DO [facility]        Today's Diagnoses     Cutaneous abscess of groin    -  1    Type 1 diabetes mellitus with peripheral autonomic neuropathy (H)           Follow-ups after your visit        Who to contact     If you have questions or need follow up information about today's clinic visit or your schedule please contact [facility] directly at [phone].  Normal or non-critical lab and imaging results will be communicated to you by Proxinohart, letter or phone within 4 business days after the clinic has received the results. If you do not hear from us within 7 days, please contact the clinic through Proxinohart or phone. If you have a critical or abnormal lab result, we will notify you by phone as soon as possible.  Submit refill requests through [employer] or call your pharmacy and they will forward the refill request to us. Please allow 3 business days for your refill to be completed.          Additional Information About Your Visit        MyChart Information     [employer] lets you send messages to your doctor, view your test results, renew your prescriptions, schedule appointments and more. To sign up, go to [email or web address] . Click on \"Log in\" on the left side of the screen, which will take you to the Welcome page. Then click on \"Sign up Now\" on the right side of the page.     You will be asked to enter the access code listed below, as well as some personal information. Please follow the directions to create your username and password.     Your access code is: [email or web address]  Expires: 10/23/2017 10:02 AM     Your access code will  in 90 days. If you need help or a new code, please call your [facility] or [phone].        Care " "EveryWhere ID     This is your Care EveryWhere ID. This could be used by other organizations to access your [facility] medical records  [id number]        Your Vitals Were     Pulse Temperature Respirations Height BMI (Body Mass Index)       88 98.6  F (37  C) (Tympanic) 16 5' 11\" (1.803 m) 22.73 kg/m2        Blood Pressure from Last 3 Encounters:   07/25/17 122/70   04/12/17 118/73   03/31/17 122/72    Weight from Last 3 Encounters:   07/25/17 163 lb (73.9 kg)   05/04/17 165 lb (74.8 kg)   04/13/17 160 lb (72.6 kg)              Today, you had the following     No orders found for display         Today's Medication Changes          These changes are accurate as of: 7/25/17 10:02 AM.  If you have any questions, ask your nurse or doctor.               Start taking these medicines.        Dose/Directions    amoxicillin-clavulanate 500-125 MG per tablet   Commonly known as:  AUGMENTIN   Used for:  Cutaneous abscess of groin   Started by:  Param Reyes DO        Dose:  1 tablet   Take 1 tablet by mouth 3 times daily   Quantity:  21 tablet   Refills:  0            Where to get your medicines      These medications were sent to [id number] - [place], [place] - [address] [place] [address]     Phone:  [phone]     amoxicillin-clavulanate 500-125 MG per tablet                Primary Care Provider Office Phone # Fax #    Jose Sood [phone]       [facility] [address] [place] [address]        Equal Access to Services     [facility] AH: Hadii miri ku hadasho Soomaali, waaxda luqadaha, qaybta kaalmada adeegyaraymond, americo lacy. So [facility] [phone].    ATENCIÓN: Si habla español, tiene a leblanc disposición servicios gratuitos de asistencia lingüística. Llame al [phone].    We comply with applicable federal civil rights laws and [place] laws. We do not discriminate on the basis of race, color, " national origin, age, disability sex, sexual orientation or gender identity.            Thank you!     Thank you for choosing [facility]  for your care. Our goal is always to provide you with excellent care. Hearing back from our patients is one way we can continue to improve our services. Please take a few minutes to complete the written survey that you may receive in the mail after your visit with us. Thank you!             Your Updated Medication List - Protect others around you: Learn how to safely use, store and throw away your medicines at www.disposemymeds.org.          This list is accurate as of: 7/25/17 10:02 AM.  Always use your most recent med list.                   Brand Name Dispense Instructions for use Diagnosis    ACCU-CHEK ROBSON test strip   Generic drug:  blood glucose monitoring     100 strip    Use to test blood sugars 6-7 times daily.    Type 1 diabetes, HbA1c goal < 7% (H)       * ACE/ARB NOT PRESCRIBED (INTENTIONAL)      by Other route continuous prn.    Type 1 diabetes, HbA1c goal < 7% (H)       amoxicillin-clavulanate 500-125 MG per tablet    AUGMENTIN    21 tablet    Take 1 tablet by mouth 3 times daily    Cutaneous abscess of groin       * ASPIRIN NOT PRESCRIBED    INTENTIONAL     by Other route continuous prn Reported on 3/2/2017        IBUPROFEN PO      Take 600 mg by mouth        insulin glargine 100 UNIT/ML injection     15 mL    Inject 45 Units Subcutaneous daily    Type 1 diabetes mellitus with peripheral autonomic neuropathy (H)       insulin lispro 100 UNIT/ML injection    HumaLOG KWIKpen    15 mL    INJECT SUBCUTANEOUSLY 3 TIMES DAILY (BEFORE MEALS) USE ACCORDING TO SLIDING SCALE.    Type 1 diabetes mellitus with peripheral autonomic neuropathy (H)       insulin pen needle 31G X 5 MM     100 each    1 Box See Admin Instructions Use 4 time(s) a day.    Type 1 diabetes, HbA1c goal < 7% (H), Routine general medical examination at a health care facility, Other specified  idiopathic peripheral neuropathy       * STATIN NOT PRESCRIBED (INTENTIONAL)     0 each    Reported on 3/2/2017        * Notice:  This list has 3 medication(s) that are the same as other medications prescribed for you. Read the directions carefully, and ask your doctor or other care provider to review them with you.

## 2017-07-25 NOTE — LETTER
Rafat Baird  [id number]    [facility] [address]  [phone] (home)     : 1990          To Whom it May Concern:    Rafat Baird was seen in our ER today, 2017. I expect his condition to improve over the next 1 days.  He may return to work/school, without restriction, when improved. If not improved during the above expected time period,  then I have encouraged him to be rechecked in his clinic for further evaluation.      Please contact me for questions or concerns.    Sincerely,            DO SHELLEY [place]    Portions of this note were produced using [employer]  Although every attempt at real-time proof reading has been made, occasional grammar/syntax errors may have been missed.

## 2017-07-25 NOTE — NURSING NOTE
"Chief Complaint   Patient presents with     Mass     Right side groin x 4 days       Initial /70  Pulse 88  Temp 98.6  F (37  C) (Tympanic)  Resp 16  Ht 5' 11\" (1.803 m)  Wt 163 lb (73.9 kg)  BMI 22.73 kg/m2 Estimated body mass index is 22.73 kg/(m^2) as calculated from the following:    Height as of this encounter: 5' 11\" (1.803 m).    Weight as of this encounter: 163 lb (73.9 kg).  Medication Reconciliation: complete   Audra Corley CMA (AAMA)   "
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
## 2017-10-16 ENCOUNTER — OFFICE VISIT (OUTPATIENT)
Dept: URGENT CARE | Facility: RETAIL CLINIC | Age: 27
End: 2017-10-16
Payer: COMMERCIAL

## 2017-10-16 VITALS
DIASTOLIC BLOOD PRESSURE: 77 MMHG | TEMPERATURE: 98.5 F | SYSTOLIC BLOOD PRESSURE: 129 MMHG | HEART RATE: 92 BPM | OXYGEN SATURATION: 98 %

## 2017-10-16 DIAGNOSIS — R05.9 COUGH: Primary | ICD-10-CM

## 2017-10-16 DIAGNOSIS — J01.90 ACUTE SINUSITIS WITH SYMPTOMS > 10 DAYS: ICD-10-CM

## 2017-10-16 DIAGNOSIS — R09.81 NASAL CONGESTION: ICD-10-CM

## 2017-10-16 PROCEDURE — 99203 OFFICE O/P NEW LOW 30 MIN: CPT | Performed by: NURSE PRACTITIONER
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
## 2017-10-16 NOTE — NURSING NOTE
"Chief Complaint   Patient presents with     Nasal Congestion     x 2-3 weeks got worse last 2 days      Cough       Initial /77  Pulse 92  Temp 98.5  F (36.9  C) (Oral)  SpO2 98% Estimated body mass index is 22.73 kg/(m^2) as calculated from the following:    Height as of 7/25/17: 5' 11\" (1.803 m).    Weight as of 7/25/17: 163 lb (73.9 kg).  Medication Reconciliation: complete   Sindi Santoro      "

## 2017-10-16 NOTE — LETTER
[facility]  [address]  [facility] [address]  Phone: [phone]    October 16, 2017        Rafat Baird  [id number]    [facility] [address]          To whom it may concern:    RE: Rafat DOHERTY Brie    Patient was seen and treated today at our clinic and missed work.    Please contact me for questions or concerns.      Sincerely,        MIGUEL Melgoza CNP

## 2017-10-16 NOTE — MR AVS SNAPSHOT
"              After Visit Summary   10/16/2017    Rafat Baird    MRN: [id number]           Patient Information     Date Of Birth          1990        Visit Information        Provider Department      10/16/2017 4:10 PM Bebeto Mclain APRN CNP [facility]        Today's Diagnoses     Cough    -  1    Nasal congestion        Acute sinusitis with symptoms > 10 days           Follow-ups after your visit        Your next 10 appointments already scheduled     Oct 17, 2017  3:00 PM CDT   [facility]    [address] [place] [id number] [phone]              Who to contact     You can reach your care team any time of the day by calling [phone].  Notification of test results:  If you have an abnormal lab result, we will notify you by phone as soon as possible.         Additional Information About Your Visit        MyChart Information     [employer] lets you send messages to your doctor, view your test results, renew your prescriptions, schedule appointments and more. To sign up, go to [email or web address] . Click on \"Log in\" on the left side of the screen, which will take you to the Welcome page. Then click on \"Sign up Now\" on the right side of the page.     You will be asked to enter the access code listed below, as well as some personal information. Please follow the directions to create your username and password.     Your access code is: [email or web address]  Expires: 10/23/2017 10:02 AM     Your access code will  in 90 days. If you need help or a new code, please call your [facility] clinic or [phone].        Care EveryWhere ID     This is your Care EveryWhere ID. This could be used by other organizations to access your [facility] medical records  [id number]        Your Vitals Were     Pulse Temperature Pulse Oximetry             92 98.5  F (36.9  C) (Oral) 98%          Blood Pressure " from Last 3 Encounters:   10/16/17 129/77   07/25/17 122/70   04/12/17 118/73    Weight from Last 3 Encounters:   07/25/17 163 lb (73.9 kg)   05/04/17 165 lb (74.8 kg)   04/13/17 160 lb (72.6 kg)              Today, you had the following     No orders found for display         Today's Medication Changes          These changes are accurate as of: 10/16/17  4:34 PM.  If you have any questions, ask your nurse or doctor.               Start taking these medicines.        Dose/Directions    amoxicillin-clavulanate 875-125 MG per tablet   Commonly known as:  AUGMENTIN   Used for:  Acute sinusitis with symptoms > 10 days   Started by:  Bebeto Mclain, MIGUEL MACIAS        Dose:  1 tablet   Take 1 tablet by mouth 2 times daily for 14 days   Quantity:  28 tablet   Refills:  0            Where to get your medicines      These medications were sent to [id number] [place] [facility] [address]   [address] [facility] [address]     Phone:  [phone]     amoxicillin-clavulanate 875-125 MG per tablet                Primary Care Provider Office Phone # Fax #    Jose Sood [phone]       [facility] [address] [place] [address]        Equal Access to Services     MORALES KNIGHT AH: Hadii miri ku hadasho Soomaali, waaxda luqadaha, qaybta kaalmada adeegyada, waxay idiin hayaan nikolas khsolis alcy. So [facility] [phone].    ATENCIÓN: Si habla español, tiene a leblanc disposición servicios gratuitos de asistencia lingüística. Llame al [phone].    We comply with applicable federal civil rights laws and [place] laws. We do not discriminate on the basis of race, color, national origin, age, disability, sex, sexual orientation, or gender identity.            Thank you!     Thank you for choosing [facility]  for your care. Our goal is always to provide you with excellent care. Hearing back from our patients is one way we can continue to improve our  services. Please take a few minutes to complete the written survey that you may receive in the mail after your visit with us. Thank you!             Your Updated Medication List - Protect others around you: Learn how to safely use, store and throw away your medicines at www.disposemymeds.org.          This list is accurate as of: 10/16/17  4:34 PM.  Always use your most recent med list.                   Brand Name Dispense Instructions for use Diagnosis    ACCU-CHEK ROBSON test strip   Generic drug:  blood glucose monitoring     100 strip    Use to test blood sugars 6-7 times daily.    Type 1 diabetes, HbA1c goal < 7% (H)       * ACE/ARB NOT PRESCRIBED (INTENTIONAL)      by Other route continuous prn.    Type 1 diabetes, HbA1c goal < 7% (H)       amoxicillin-clavulanate 875-125 MG per tablet    AUGMENTIN    28 tablet    Take 1 tablet by mouth 2 times daily for 14 days    Acute sinusitis with symptoms > 10 days       * ASPIRIN NOT PRESCRIBED    INTENTIONAL     by Other route continuous prn Reported on 3/2/2017        BASAGLAR 100 UNIT/ML injection     15 mL    Inject 45 Units Subcutaneous daily    Type 1 diabetes mellitus with peripheral autonomic neuropathy (H)       DAYQUIL OR           IBUPROFEN PO      Take 600 mg by mouth        insulin lispro 100 UNIT/ML injection    HumaLOG KWIKpen    15 mL    INJECT SUBCUTANEOUSLY 3 TIMES DAILY (BEFORE MEALS) USE ACCORDING TO SLIDING SCALE.    Type 1 diabetes mellitus with peripheral autonomic neuropathy (H)       insulin pen needle 31G X 5 MM     100 each    1 Box See Admin Instructions Use 4 time(s) a day.    Type 1 diabetes, HbA1c goal < 7% (H), Routine general medical examination at a health care facility, Other specified idiopathic peripheral neuropathy       MUCINEX D PO           * STATIN NOT PRESCRIBED (INTENTIONAL)     0 each    Reported on 3/2/2017        * Notice:  This list has 3 medication(s) that are the same as other medications prescribed for you. Read the  directions carefully, and ask your doctor or other care provider to review them with you.

## 2017-10-23 DIAGNOSIS — E10.43 TYPE 1 DIABETES MELLITUS WITH PERIPHERAL AUTONOMIC NEUROPATHY (H): ICD-10-CM

## 2017-10-23 NOTE — TELEPHONE ENCOUNTER
Tried to call patient to offer diabetes education appointment. Left message for him to call me back. He had been referred to see me in March.  A1c is also due.   As far as Novolog, he has been on that in past. [facility] and they do need a new order for Novolog pens. Order placed but needs a provider to sign since it is new prescription.     Kimberli Mcfadden RDN, LD, CDE

## 2017-10-23 NOTE — TELEPHONE ENCOUNTER
Fax from pharmacy stating patients insurance changed and no longer cover Humalog kwikpen, wondering about changing to Novolog. Please advise, PCP is out until 10/25/17. Diane Herrera, CMA

## 2017-10-26 ENCOUNTER — OFFICE VISIT (OUTPATIENT)
Dept: URGENT CARE | Facility: RETAIL CLINIC | Age: 27
End: 2017-10-26
Payer: COMMERCIAL

## 2017-10-26 VITALS
OXYGEN SATURATION: 100 % | SYSTOLIC BLOOD PRESSURE: 133 MMHG | HEART RATE: 91 BPM | DIASTOLIC BLOOD PRESSURE: 84 MMHG | TEMPERATURE: 98 F

## 2017-10-26 DIAGNOSIS — M79.10 MUSCULAR ACHES: ICD-10-CM

## 2017-10-26 DIAGNOSIS — G47.10 HYPERSOMNIA: Primary | ICD-10-CM

## 2017-10-26 DIAGNOSIS — R53.83 FATIGUE, UNSPECIFIED TYPE: ICD-10-CM

## 2017-10-26 PROCEDURE — 99213 OFFICE O/P EST LOW 20 MIN: CPT | Performed by: NURSE PRACTITIONER

## 2017-10-26 RX ORDER — MODAFINIL 200 MG/1
100-200 TABLET ORAL DAILY
Qty: 30 TABLET | Refills: 1 | Status: SHIPPED | OUTPATIENT
Start: 2017-10-26 | End: 2018-02-25

## 2017-10-26 NOTE — LETTER
[facility]  [address]  [facility] [address]  Phone: [phone]    October 26, 2017        Rafat Baird  [id number]    [facility] [address]          To whom it may concern:    RE: Rafat DOHERTY Brie    Patient was seen and treated today at our clinic and missed work.    Please contact me for questions or concerns.      Sincerely,        MIGUEL Melgoza CNP

## 2017-10-26 NOTE — NURSING NOTE
"Chief Complaint   Patient presents with     Generalized Body Aches     Generalized bodyaches/muscle aches and fatigue x 4-5 days     Fatigue     fatigue x 4-5 days sleeping 8 hours at night but still falling alseep at work - has appt with Dr. Sood PCP next week - He states blood sugars have been good       Initial /84 (BP Location: Right arm, Patient Position: Chair, Cuff Size: Adult Regular)  Pulse 91  Temp 98  F (36.7  C) (Tympanic)  SpO2 100% Estimated body mass index is 22.73 kg/(m^2) as calculated from the following:    Height as of 7/25/17: 5' 11\" (1.803 m).    Weight as of 7/25/17: 163 lb (73.9 kg).  Medication Reconciliation: complete     Jessica Sundet      "
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
## 2017-10-26 NOTE — MR AVS SNAPSHOT
"              After Visit Summary   10/26/2017    Rafat Baird    MRN: [id number]           Patient Information     Date Of Birth          1990        Visit Information        Provider Department      10/26/2017 5:00 PM Bebeto Mclain APRN [facility]        Today's Diagnoses     Hypersomnia    -  1    Fatigue, unspecified type        Muscular aches           Follow-ups after your visit        Your next 10 appointments already scheduled     2017  8:00 AM CDT   Office Visit with Jose Sood MD   [facility]    [address] [place] [address] [id number] [phone]           Bring a current list of meds and any records pertaining to this visit. For Physicals, please bring immunization records and any forms needing to be filled out. Please arrive 10 minutes early to complete paperwork.              Who to contact     You can reach your care team any time of the day by calling [phone].  Notification of test results:  If you have an abnormal lab result, we will notify you by phone as soon as possible.         Additional Information About Your Visit        MyChart Information     [employer] lets you send messages to your doctor, view your test results, renew your prescriptions, schedule appointments and more. To sign up, go to [email or web address] . Click on \"Log in\" on the left side of the screen, which will take you to the Welcome page. Then click on \"Sign up Now\" on the right side of the page.     You will be asked to enter the access code listed below, as well as some personal information. Please follow the directions to create your username and password.     Your access code is: [email or web address]  Expires: 2018  5:24 PM     Your access code will  in 90 days. If you need help or a new code, please call your [facility] or [phone].        Care EveryWhere ID     This is your Care EveryWhere ID. This could " be used by other organizations to access your [facility] medical records  [id number]        Your Vitals Were     Pulse Temperature Pulse Oximetry             91 98  F (36.7  C) (Tympanic) 100%          Blood Pressure from Last 3 Encounters:   10/26/17 133/84   10/16/17 129/77   07/25/17 122/70    Weight from Last 3 Encounters:   07/25/17 163 lb (73.9 kg)   05/04/17 165 lb (74.8 kg)   04/13/17 160 lb (72.6 kg)              Today, you had the following     No orders found for display         Today's Medication Changes          These changes are accurate as of: 10/26/17  5:24 PM.  If you have any questions, ask your nurse or doctor.               Start taking these medicines.        Dose/Directions    modafinil 200 MG tablet   Commonly known as:  PROVIGIL   Used for:  Fatigue, unspecified type, Hypersomnia   Started by:  Bebeto Mclain APRN CNP        Dose:  100-200 mg   Take 0.5-1 tablets (100-200 mg) by mouth daily   Quantity:  30 tablet   Refills:  1            Where to get your medicines      Some of these will need a paper prescription and others can be bought over the counter.  Ask your nurse if you have questions.     Bring a paper prescription for each of these medications     modafinil 200 MG tablet                Primary Care Provider Office Phone # Fax #    Jose Sood [phone]       [facility] [address] [place] [address]        Equal Access to Services     APOLONIA KNIGHT AH: Hadii aad ku hadasho Somichael, waaxda luqadaha, qaybta kaalmada ar, americo lacy. So [facility] [phone].    ATENCIÓN: Si habla español, tiene a leblanc disposición servicios gratuitos de asistencia lingüística. Llame al [phone].    We comply with applicable federal civil rights laws and [place] laws. We do not discriminate on the basis of race, color, national origin, age, disability, sex, sexual orientation, or gender identity.            Thank  you!     Thank you for choosing [facility]  for your care. Our goal is always to provide you with excellent care. Hearing back from our patients is one way we can continue to improve our services. Please take a few minutes to complete the written survey that you may receive in the mail after your visit with us. Thank you!             Your Updated Medication List - Protect others around you: Learn how to safely use, store and throw away your medicines at www.disposemymeds.org.          This list is accurate as of: 10/26/17  5:24 PM.  Always use your most recent med list.                   Brand Name Dispense Instructions for use Diagnosis    ACCU-CHEK ROBSON test strip   Generic drug:  blood glucose monitoring     100 strip    Use to test blood sugars 6-7 times daily.    Type 1 diabetes, HbA1c goal < 7% (H)       * ACE/ARB/ARNI NOT PRESCRIBED (INTENTIONAL)      by Other route continuous prn.    Type 1 diabetes, HbA1c goal < 7% (H)       amoxicillin-clavulanate 875-125 MG per tablet    AUGMENTIN    28 tablet    Take 1 tablet by mouth 2 times daily for 14 days    Acute sinusitis with symptoms > 10 days       * ASPIRIN NOT PRESCRIBED    INTENTIONAL     by Other route continuous prn Reported on 3/2/2017        BASAGLAR 100 UNIT/ML injection     15 mL    Inject 45 Units Subcutaneous daily    Type 1 diabetes mellitus with peripheral autonomic neuropathy (H)       DAYQUIL OR           IBUPROFEN PO      Take 600 mg by mouth        insulin aspart 100 UNIT/ML injection    NovoLOG FLEXPEN    15 mL    INJECT SUBCUTANEOUSLY 3 TIMES DAILY (BEFORE MEALS) USE ACCORDING TO SLIDING SCALE.    Type 1 diabetes mellitus with peripheral autonomic neuropathy (H)       insulin lispro 100 UNIT/ML injection    HumaLOG KWIKpen    15 mL    INJECT SUBCUTANEOUSLY 3 TIMES DAILY (BEFORE MEALS) USE ACCORDING TO SLIDING SCALE.    Type 1 diabetes mellitus with peripheral autonomic neuropathy (H)       insulin pen needle 31G X 5 MM      100 each    1 Box See Admin Instructions Use 4 time(s) a day.    Type 1 diabetes, HbA1c goal < 7% (H), Routine general medical examination at a health care facility, Other specified idiopathic peripheral neuropathy       modafinil 200 MG tablet    PROVIGIL    30 tablet    Take 0.5-1 tablets (100-200 mg) by mouth daily    Fatigue, unspecified type, Hypersomnia       MUCINEX D PO           * STATIN NOT PRESCRIBED (INTENTIONAL)     0 each    Reported on 3/2/2017        * Notice:  This list has 3 medication(s) that are the same as other medications prescribed for you. Read the directions carefully, and ask your doctor or other care provider to review them with you.

## 2017-11-03 ENCOUNTER — OFFICE VISIT (OUTPATIENT)
Dept: FAMILY MEDICINE | Facility: CLINIC | Age: 27
End: 2017-11-03
Payer: COMMERCIAL

## 2017-11-03 VITALS
SYSTOLIC BLOOD PRESSURE: 130 MMHG | HEIGHT: 71 IN | BODY MASS INDEX: 23.6 KG/M2 | TEMPERATURE: 96.9 F | OXYGEN SATURATION: 97 % | HEART RATE: 117 BPM | WEIGHT: 168.6 LBS | DIASTOLIC BLOOD PRESSURE: 60 MMHG

## 2017-11-03 DIAGNOSIS — E10.43 TYPE 1 DIABETES MELLITUS WITH PERIPHERAL AUTONOMIC NEUROPATHY (H): Primary | ICD-10-CM

## 2017-11-03 DIAGNOSIS — R53.83 FATIGUE, UNSPECIFIED TYPE: ICD-10-CM

## 2017-11-03 LAB
ALBUMIN SERPL-MCNC: 3.7 G/DL (ref 3.4–5)
ALBUMIN UR-MCNC: NEGATIVE MG/DL
ALP SERPL-CCNC: 84 U/L (ref 40–150)
ALT SERPL W P-5'-P-CCNC: 33 U/L (ref 0–70)
ANION GAP SERPL CALCULATED.3IONS-SCNC: 5 MMOL/L (ref 3–14)
APPEARANCE UR: CLEAR
AST SERPL W P-5'-P-CCNC: 23 U/L (ref 0–45)
BASOPHILS # BLD AUTO: 0 10E9/L (ref 0–0.2)
BASOPHILS NFR BLD AUTO: 0.4 %
BILIRUB SERPL-MCNC: 1 MG/DL (ref 0.2–1.3)
BILIRUB UR QL STRIP: NEGATIVE
BUN SERPL-MCNC: 14 MG/DL (ref 7–30)
CALCIUM SERPL-MCNC: 8.7 MG/DL (ref 8.5–10.1)
CHLORIDE SERPL-SCNC: 102 MMOL/L (ref 94–109)
CK SERPL-CCNC: 313 U/L (ref 30–300)
CO2 SERPL-SCNC: 30 MMOL/L (ref 20–32)
COLOR UR AUTO: YELLOW
CREAT SERPL-MCNC: 0.76 MG/DL (ref 0.66–1.25)
DIFFERENTIAL METHOD BLD: NORMAL
EOSINOPHIL # BLD AUTO: 0.4 10E9/L (ref 0–0.7)
EOSINOPHIL NFR BLD AUTO: 4.1 %
ERYTHROCYTE [DISTWIDTH] IN BLOOD BY AUTOMATED COUNT: 12.1 % (ref 10–15)
GFR SERPL CREATININE-BSD FRML MDRD: >90 ML/MIN/1.7M2
GLUCOSE SERPL-MCNC: 251 MG/DL (ref 70–99)
GLUCOSE UR STRIP-MCNC: 150 MG/DL
HBA1C MFR BLD: 8.5 % (ref 4.3–6)
HCT VFR BLD AUTO: 46.9 % (ref 40–53)
HGB BLD-MCNC: 15.8 G/DL (ref 13.3–17.7)
HGB UR QL STRIP: NEGATIVE
IMM GRANULOCYTES # BLD: 0 10E9/L (ref 0–0.4)
IMM GRANULOCYTES NFR BLD: 0.3 %
KETONES UR STRIP-MCNC: NEGATIVE MG/DL
LEUKOCYTE ESTERASE UR QL STRIP: NEGATIVE
LYMPHOCYTES # BLD AUTO: 1.9 10E9/L (ref 0.8–5.3)
LYMPHOCYTES NFR BLD AUTO: 20.7 %
MCH RBC QN AUTO: 30.1 PG (ref 26.5–33)
MCHC RBC AUTO-ENTMCNC: 33.7 G/DL (ref 31.5–36.5)
MCV RBC AUTO: 89 FL (ref 78–100)
MONOCYTES # BLD AUTO: 0.5 10E9/L (ref 0–1.3)
MONOCYTES NFR BLD AUTO: 5.4 %
NEUTROPHILS # BLD AUTO: 6.3 10E9/L (ref 1.6–8.3)
NEUTROPHILS NFR BLD AUTO: 69.1 %
NITRATE UR QL: NEGATIVE
PH UR STRIP: 6 PH (ref 5–7)
PLATELET # BLD AUTO: 253 10E9/L (ref 150–450)
POTASSIUM SERPL-SCNC: 4.2 MMOL/L (ref 3.4–5.3)
PROT SERPL-MCNC: 6.8 G/DL (ref 6.8–8.8)
RBC # BLD AUTO: 5.25 10E12/L (ref 4.4–5.9)
SODIUM SERPL-SCNC: 137 MMOL/L (ref 133–144)
SOURCE: ABNORMAL
SP GR UR STRIP: 1.01 (ref 1–1.03)
TSH SERPL DL<=0.005 MIU/L-ACNC: 1.04 MU/L (ref 0.4–4)
UROBILINOGEN UR STRIP-MCNC: 0 MG/DL (ref 0–2)
WBC # BLD AUTO: 9.2 10E9/L (ref 4–11)

## 2017-11-03 PROCEDURE — 99214 OFFICE O/P EST MOD 30 MIN: CPT | Performed by: FAMILY MEDICINE

## 2017-11-03 PROCEDURE — 80050 GENERAL HEALTH PANEL: CPT | Performed by: FAMILY MEDICINE

## 2017-11-03 PROCEDURE — 83036 HEMOGLOBIN GLYCOSYLATED A1C: CPT | Performed by: FAMILY MEDICINE

## 2017-11-03 PROCEDURE — 36415 COLL VENOUS BLD VENIPUNCTURE: CPT | Performed by: FAMILY MEDICINE

## 2017-11-03 PROCEDURE — 86618 LYME DISEASE ANTIBODY: CPT | Performed by: FAMILY MEDICINE

## 2017-11-03 PROCEDURE — 81003 URINALYSIS AUTO W/O SCOPE: CPT | Performed by: FAMILY MEDICINE

## 2017-11-03 PROCEDURE — 82550 ASSAY OF CK (CPK): CPT | Performed by: FAMILY MEDICINE

## 2017-11-03 ASSESSMENT — PAIN SCALES - GENERAL: PAINLEVEL: NO PAIN (0)
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
## 2017-11-03 NOTE — NURSING NOTE
"Chief Complaint   Patient presents with     Fatigue       Initial /60 (BP Location: Right arm, Patient Position: Chair, Cuff Size: Adult Regular)  Pulse 117  Temp 96.9  F (36.1  C) (Temporal)  Ht 5' 11\" (1.803 m)  Wt 168 lb 9.6 oz (76.5 kg)  SpO2 97%  BMI 23.51 kg/m2 Estimated body mass index is 23.51 kg/(m^2) as calculated from the following:    Height as of this encounter: 5' 11\" (1.803 m).    Weight as of this encounter: 168 lb 9.6 oz (76.5 kg).  Medication Reconciliation: complete   Parris/DAJUAN     "

## 2017-11-03 NOTE — MR AVS SNAPSHOT
After Visit Summary   11/3/2017    Rafat Baird    MRN: [id number]           Patient Information     Date Of Birth          1990        Visit Information        Provider Department      11/3/2017 8:00 AM Jose Sood MD [facility]        Today's Diagnoses     Type 1 diabetes mellitus with peripheral autonomic neuropathy (H)    -  1    Fatigue, unspecified type           Follow-ups after your visit        Additional Services     MED THERAPY MANAGE REFERRAL       Your provider has referred you to: **[facility] (numerous locations) [phone]   [email or web address]  FMG: [facility] [phone]   [email or web address]    Reason for Referral:  Diabetes     The [facility] department will contact you to schedule an appointment.  You may also schedule the appointment by calling [phone].  For [facility] [place] [facility] [place] patients, please call [phone] to confirm/schedule your appointment on the next business day.    This service is designed to help you get the most from your medications.  A specially trained Pharmacist will work closely with you and your providers to solve any questions, concerns, issues or problems related to your medications.    Please bring all of your prescription and non-prescription medications (such as vitamins, over-the-counter medications, and herbals) or a detailed medication list to your appointment.    If you have a glucose meter or other home monitoring information, please also bring this to your appointment (i.e. blood glucose log, blood pressure log, pain log, etc.).                  Who to contact     If you have questions or need follow up information about today's clinic visit or your schedule please contact [facility] directly at  "[phone].  Normal or non-critical lab and imaging results will be communicated to you by AppwoRxhart, letter or phone within 4 business days after the clinic has received the results. If you do not hear from us within 7 days, please contact the clinic through AppwoRxhart or phone. If you have a critical or abnormal lab result, we will notify you by phone as soon as possible.  Submit refill requests through [employer] or call your pharmacy and they will forward the refill request to us. Please allow 3 business days for your refill to be completed.          Additional Information About Your Visit        AppwoRxharGelato Fiasco Information     [employer] lets you send messages to your doctor, view your test results, renew your prescriptions, schedule appointments and more. To sign up, go to [email or web address] . Click on \"Log in\" on the left side of the screen, which will take you to the Welcome page. Then click on \"Sign up Now\" on the right side of the page.     You will be asked to enter the access code listed below, as well as some personal information. Please follow the directions to create your username and password.     Your access code is: [email or web address]  Expires: 2018  4:24 PM     Your access code will  in 90 days. If you need help or a new code, please call your [facility] clinic or [phone].        Care EveryWhere ID     This is your Care EveryWhere ID. This could be used by other organizations to access your [facility] medical records  [id number]        Your Vitals Were     Pulse Temperature Height Pulse Oximetry BMI (Body Mass Index)       117 96.9  F (36.1  C) (Temporal) 5' 11\" (1.803 m) 97% 23.51 kg/m2        Blood Pressure from Last 3 Encounters:   17 130/60   10/26/17 133/84   10/16/17 129/77    Weight from Last 3 Encounters:   17 168 lb 9.6 oz (76.5 kg)   17 163 lb (73.9 kg)   17 165 lb (74.8 kg)              We Performed the Following     *UA reflex to Microscopic and Culture ([place]; " [facility]; [facility]; [facility]; [facility]; [facility]; [place]; [place])     CBC with platelets differential     CK total     Comprehensive metabolic panel (BMP + Alb, Alk Phos, ALT, AST, Total. Bili, TP)     Hemoglobin A1c     Lyme Disease Yuliya with reflex to WB Serum     MED THERAPY MANAGE REFERRAL     TSH with free T4 reflex          Today's Medication Changes          These changes are accurate as of: 11/3/17 11:59 PM.  If you have any questions, ask your nurse or doctor.               Stop taking these medicines if you haven't already. Please contact your care team if you have questions.     insulin lispro 100 UNIT/ML injection   Commonly known as:  HumaLOG KWIKpen   Stopped by:  Jose Sood MD                    Primary Care Provider Office Phone # Fax #    Jose Sood [phone]       [place] [address] [place] [address]        Equal Access to Services     APOLONIA KNIGHT : Hadii aad ku hadasho Soomaali, waaxda luqadaha, qaybta kaalmada adeegyada, waxay idiin hayaan patriciaeg kharash lafletcher . So [facility] [phone].    ATENCIÓN: Si habla español, tiene a leblanc disposición servicios gratuitos de asistencia lingüística. Llame al [phone].    We comply with applicable federal civil rights laws and [place] laws. We do not discriminate on the basis of race, color, national origin, age, disability, sex, sexual orientation, or gender identity.            Thank you!     Thank you for choosing [facility]  for your care. Our goal is always to provide you with excellent care. Hearing back from our patients is one way we can continue to improve our services. Please take a few minutes to complete the written survey that you may receive in the mail after your visit with us. Thank you!             Your Updated Medication List - Protect others around you: Learn how to safely use, store and throw away your medicines at  www.disposemymeds.org.          This list is accurate as of: 11/3/17 11:59 PM.  Always use your most recent med list.                   Brand Name Dispense Instructions for use Diagnosis    ACCU-CHEK ROBSON test strip   Generic drug:  blood glucose monitoring     100 strip    Use to test blood sugars 6-7 times daily.    Type 1 diabetes, HbA1c goal < 7% (H)       * ACE/ARB/ARNI NOT PRESCRIBED (INTENTIONAL)      by Other route continuous prn.    Type 1 diabetes, HbA1c goal < 7% (H)       * ASPIRIN NOT PRESCRIBED    INTENTIONAL     by Other route continuous prn Reported on 3/2/2017        BASAGLAR 100 UNIT/ML injection     15 mL    Inject 45 Units Subcutaneous daily    Type 1 diabetes mellitus with peripheral autonomic neuropathy (H)       DAYQUIL OR           IBUPROFEN PO      Take 600 mg by mouth        insulin aspart 100 UNIT/ML injection    NovoLOG FLEXPEN    15 mL    INJECT SUBCUTANEOUSLY 3 TIMES DAILY (BEFORE MEALS) USE ACCORDING TO SLIDING SCALE.    Type 1 diabetes mellitus with peripheral autonomic neuropathy (H)       insulin pen needle 31G X 5 MM     100 each    1 Box See Admin Instructions Use 4 time(s) a day.    Type 1 diabetes, HbA1c goal < 7% (H), Routine general medical examination at a health care facility, Other specified idiopathic peripheral neuropathy       modafinil 200 MG tablet    PROVIGIL    30 tablet    Take 0.5-1 tablets (100-200 mg) by mouth daily    Fatigue, unspecified type, Hypersomnia       MUCINEX D PO           * STATIN NOT PRESCRIBED (INTENTIONAL)     0 each    Reported on 3/2/2017        * Notice:  This list has 3 medication(s) that are the same as other medications prescribed for you. Read the directions carefully, and ask your doctor or other care provider to review them with you.

## 2017-11-04 LAB — B BURGDOR IGG+IGM SER QL: 0.2 (ref 0–0.89)

## 2017-11-08 ENCOUNTER — TELEPHONE (OUTPATIENT)
Dept: FAMILY MEDICINE | Facility: CLINIC | Age: 27
End: 2017-11-08

## 2017-11-16 ENCOUNTER — TELEPHONE (OUTPATIENT)
Dept: FAMILY MEDICINE | Facility: CLINIC | Age: 27
End: 2017-11-16

## 2017-11-16 ENCOUNTER — TELEPHONE (OUTPATIENT)
Dept: PHARMACY | Facility: OTHER | Age: 27
End: 2017-11-16

## 2017-11-16 NOTE — PROGRESS NOTES
Lymes test negative. Urine normal thyroid test normal. Blood test for her muscle enzyme and blood was borderline elevated. Chemistry panel is fine but your sugar at the time of this test was 251. Hemoglobin A1c was 8.5. I would like you to see Jm Gilbert  again (the pharmacist) to try to get him blood sugars in tighter control.

## 2017-11-16 NOTE — TELEPHONE ENCOUNTER
Left VM for patient to return call. Would like to give lab results an have patient set up appointment with Jm Gilbert. If he calls back please help him with this.

## 2017-11-16 NOTE — TELEPHONE ENCOUNTER
MTM referral from: [place] clinic visit (referral by provider)    MTM referral outreach attempt #1 on November 16, 2017 at 3:34 PM      Outcome: Left Message-not covered    Veronica Manriquez MTM Coordinator

## 2017-11-16 NOTE — TELEPHONE ENCOUNTER
----- Message from Jose Sood MD sent at 11/16/2017  1:42 PM CST -----  Lymes test negative. Urine normal thyroid test normal. Blood test for her muscle enzyme and blood was borderline elevated. Chemistry panel is fine but your sugar at the time of this test was 251. Hemoglobin A1c was 8.5. I would like you to see Jm Gilbert  again (the pharmacist) to try to get him blood sugars in tighter control.

## 2017-11-17 NOTE — TELEPHONE ENCOUNTER
MTM referral from: [place] clinic visit (referral by provider)    MTM referral outreach attempt #2 on November 17, 2017 at 1:30 PM      Outcome: No Answer    Vivi Perez, MTM Coordinator Intern

## 2018-02-25 ENCOUNTER — APPOINTMENT (OUTPATIENT)
Dept: GENERAL RADIOLOGY | Facility: CLINIC | Age: 28
End: 2018-02-25
Attending: FAMILY MEDICINE
Payer: COMMERCIAL

## 2018-02-25 ENCOUNTER — HOSPITAL ENCOUNTER (EMERGENCY)
Facility: CLINIC | Age: 28
Discharge: HOME | End: 2018-02-25
Attending: FAMILY MEDICINE | Admitting: FAMILY MEDICINE
Payer: COMMERCIAL

## 2018-02-25 VITALS
WEIGHT: 170 LBS | SYSTOLIC BLOOD PRESSURE: 140 MMHG | OXYGEN SATURATION: 100 % | DIASTOLIC BLOOD PRESSURE: 76 MMHG | BODY MASS INDEX: 23.71 KG/M2 | RESPIRATION RATE: 20 BRPM | HEART RATE: 102 BPM | TEMPERATURE: 98.4 F
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
## 2018-02-25 DIAGNOSIS — M79.605 PAIN OF LEFT LOWER EXTREMITY: ICD-10-CM

## 2018-02-25 DIAGNOSIS — S82.202A TIBIA/FIBULA FRACTURE, LEFT, CLOSED, INITIAL ENCOUNTER: ICD-10-CM

## 2018-02-25 DIAGNOSIS — S82.402A TIBIA/FIBULA FRACTURE, LEFT, CLOSED, INITIAL ENCOUNTER: ICD-10-CM

## 2018-02-25 PROCEDURE — 73630 X-RAY EXAM OF FOOT: CPT | Mod: TC,LT

## 2018-02-25 PROCEDURE — 25000132 ZZH RX MED GY IP 250 OP 250 PS 637: Performed by: FAMILY MEDICINE

## 2018-02-25 PROCEDURE — 25000125 ZZHC RX 250: Performed by: FAMILY MEDICINE

## 2018-02-25 PROCEDURE — 73610 X-RAY EXAM OF ANKLE: CPT | Mod: TC,LT

## 2018-02-25 PROCEDURE — 99284 EMERGENCY DEPT VISIT MOD MDM: CPT | Mod: Z6 | Performed by: FAMILY MEDICINE

## 2018-02-25 PROCEDURE — 99284 EMERGENCY DEPT VISIT MOD MDM: CPT | Mod: 25 | Performed by: FAMILY MEDICINE

## 2018-02-25 PROCEDURE — 73590 X-RAY EXAM OF LOWER LEG: CPT | Mod: TC,LT

## 2018-02-25 PROCEDURE — 29505 APPLICATION LONG LEG SPLINT: CPT | Mod: LT | Performed by: FAMILY MEDICINE

## 2018-02-25 RX ORDER — ONDANSETRON 4 MG/1
4 TABLET, ORALLY DISINTEGRATING ORAL
Status: COMPLETED | OUTPATIENT
Start: 2018-02-25 | End: 2018-02-25

## 2018-02-25 RX ORDER — OXYCODONE HYDROCHLORIDE 5 MG/1
5-10 TABLET ORAL EVERY 4 HOURS PRN
Qty: 12 TABLET | Refills: 0 | Status: SHIPPED | OUTPATIENT
Start: 2018-02-25 | End: 2018-03-13

## 2018-02-25 RX ORDER — OXYCODONE HYDROCHLORIDE 5 MG/1
10 TABLET ORAL ONCE
Status: COMPLETED | OUTPATIENT
Start: 2018-02-25 | End: 2018-02-25

## 2018-02-25 RX ADMIN — ONDANSETRON 4 MG: 4 TABLET, ORALLY DISINTEGRATING ORAL at 20:36

## 2018-02-25 RX ADMIN — OXYCODONE HYDROCHLORIDE 10 MG: 5 TABLET ORAL at 20:37

## 2018-02-25 ASSESSMENT — ENCOUNTER SYMPTOMS
ABDOMINAL PAIN: 0
WOUND: 0
NUMBNESS: 1
ARTHRALGIAS: 1
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
## 2018-02-25 NOTE — ED AVS SNAPSHOT
[facility] Emergency Department    [address] [place] [address]    Phone:  [phone]    Fax:  [phone]                                       Rafat Baird   MRN: [id number]    Department:  [facility] Emergency Department   Date of Visit:  2/25/2018           After Visit Summary Signature Page     I have received my discharge instructions, and my questions have been answered. I have discussed any challenges I see with this plan with the nurse or doctor.    ..........................................................................................................................................  Patient/Patient Representative Signature      ..........................................................................................................................................  Patient Representative Print Name and Relationship to Patient    ..................................................               ................................................  Date                                            Time    ..........................................................................................................................................  Reviewed by Signature/Title    ...................................................              ..............................................  Date                                                            Time

## 2018-02-26 ENCOUNTER — OFFICE VISIT (OUTPATIENT)
Dept: ORTHOPEDICS | Facility: CLINIC | Age: 28
End: 2018-02-26
Payer: COMMERCIAL

## 2018-02-26 ENCOUNTER — TELEPHONE (OUTPATIENT)
Dept: ORTHOPEDICS | Facility: CLINIC | Age: 28
End: 2018-02-26

## 2018-02-26 ENCOUNTER — OFFICE VISIT (OUTPATIENT)
Dept: INTERNAL MEDICINE | Facility: CLINIC | Age: 28
End: 2018-02-26
Payer: COMMERCIAL

## 2018-02-26 VITALS
SYSTOLIC BLOOD PRESSURE: 100 MMHG | OXYGEN SATURATION: 97 % | DIASTOLIC BLOOD PRESSURE: 62 MMHG | HEART RATE: 82 BPM | WEIGHT: 170 LBS | BODY MASS INDEX: 23.71 KG/M2 | TEMPERATURE: 98.2 F

## 2018-02-26 VITALS — TEMPERATURE: 97.8 F | HEIGHT: 71 IN | WEIGHT: 170 LBS | BODY MASS INDEX: 23.8 KG/M2

## 2018-02-26 DIAGNOSIS — S82.402A CLOSED FRACTURE OF LEFT TIBIA AND FIBULA, INITIAL ENCOUNTER: Primary | ICD-10-CM

## 2018-02-26 DIAGNOSIS — Z23 NEED FOR PROPHYLACTIC VACCINATION AGAINST STREPTOCOCCUS PNEUMONIAE (PNEUMOCOCCUS): ICD-10-CM

## 2018-02-26 DIAGNOSIS — E10.43 TYPE 1 DIABETES MELLITUS WITH PERIPHERAL AUTONOMIC NEUROPATHY (H): ICD-10-CM

## 2018-02-26 DIAGNOSIS — Z13.89 SCREENING FOR DIABETIC PERIPHERAL NEUROPATHY: ICD-10-CM

## 2018-02-26 DIAGNOSIS — M21.962 FOOT DEFORMITY, ACQUIRED, LEFT: ICD-10-CM

## 2018-02-26 DIAGNOSIS — Z23 NEED FOR PROPHYLACTIC VACCINATION AND INOCULATION AGAINST INFLUENZA: ICD-10-CM

## 2018-02-26 DIAGNOSIS — Z01.818 PREOP GENERAL PHYSICAL EXAM: ICD-10-CM

## 2018-02-26 DIAGNOSIS — S82.202A CLOSED FRACTURE OF LEFT TIBIA AND FIBULA, INITIAL ENCOUNTER: Primary | ICD-10-CM
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
## 2018-02-26 LAB
ANION GAP SERPL CALCULATED.3IONS-SCNC: 5 MMOL/L (ref 3–14)
BUN SERPL-MCNC: 12 MG/DL (ref 7–30)
CALCIUM SERPL-MCNC: 9.1 MG/DL (ref 8.5–10.1)
CHLORIDE SERPL-SCNC: 108 MMOL/L (ref 94–109)
CO2 SERPL-SCNC: 29 MMOL/L (ref 20–32)
CREAT SERPL-MCNC: 0.77 MG/DL (ref 0.66–1.25)
ERYTHROCYTE [DISTWIDTH] IN BLOOD BY AUTOMATED COUNT: 12.8 % (ref 10–15)
GFR SERPL CREATININE-BSD FRML MDRD: >90 ML/MIN/1.7M2
GLUCOSE SERPL-MCNC: 59 MG/DL (ref 70–99)
HCT VFR BLD AUTO: 46.9 % (ref 40–53)
HGB BLD-MCNC: 15.3 G/DL (ref 13.3–17.7)
MCH RBC QN AUTO: 29.9 PG (ref 26.5–33)
MCHC RBC AUTO-ENTMCNC: 32.6 G/DL (ref 31.5–36.5)
MCV RBC AUTO: 92 FL (ref 78–100)
PLATELET # BLD AUTO: 205 10E9/L (ref 150–450)
POTASSIUM SERPL-SCNC: 4.1 MMOL/L (ref 3.4–5.3)
RBC # BLD AUTO: 5.11 10E12/L (ref 4.4–5.9)
SODIUM SERPL-SCNC: 142 MMOL/L (ref 133–144)
WBC # BLD AUTO: 9.3 10E9/L (ref 4–11)

## 2018-02-26 PROCEDURE — 99214 OFFICE O/P EST MOD 30 MIN: CPT | Performed by: ORTHOPAEDIC SURGERY

## 2018-02-26 PROCEDURE — 80048 BASIC METABOLIC PNL TOTAL CA: CPT | Performed by: INTERNAL MEDICINE

## 2018-02-26 PROCEDURE — 36415 COLL VENOUS BLD VENIPUNCTURE: CPT | Performed by: INTERNAL MEDICINE

## 2018-02-26 PROCEDURE — 99215 OFFICE O/P EST HI 40 MIN: CPT | Performed by: INTERNAL MEDICINE

## 2018-02-26 PROCEDURE — 85027 COMPLETE CBC AUTOMATED: CPT | Performed by: INTERNAL MEDICINE

## 2018-02-26 ASSESSMENT — PAIN SCALES - GENERAL
PAINLEVEL: MODERATE PAIN (5)
PAINLEVEL: MODERATE PAIN (5)
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
## 2018-02-26 NOTE — DISCHARGE INSTRUCTIONS
Please read and follow the handout(s) instructions. Return, if needed, for increased or worsening symptoms and as directed by the handout(s).    You will be scheduled for a follow-up appointment in the Ortho clinic.    Ice and elevate to the area to reduce swelling.    Electronically signed, Rafat Myers DO
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
## 2018-02-26 NOTE — TELEPHONE ENCOUNTER
Type of surgery: ORIF left distal tibia and fibula  Location of surgery: [facility]  Date and time of surgery: 2/28/18  Surgeon: Dr. Starkey  Pre-Op Appt Date: 2/26/18  Post-Op Appt Date: 3/13/18   Packet sent out: Given to patient in clinic  Pre-cert/Authorization completed:  Not Applicable  Date: 2/26/18
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
## 2018-02-26 NOTE — NURSING NOTE
"Chief Complaint   Patient presents with     Pre-Op Exam       Initial /62  Pulse 82  Temp 98.2  F (36.8  C) (Temporal)  Wt 170 lb (77.1 kg)  SpO2 97%  BMI 23.71 kg/m2 Estimated body mass index is 23.71 kg/(m^2) as calculated from the following:    Height as of an earlier encounter on 2/26/18: 5' 11\" (1.803 m).    Weight as of this encounter: 170 lb (77.1 kg).  Medication Reconciliation: complete   Karina Rhodes CMA    "
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
## 2018-02-28 ENCOUNTER — HOSPITAL ENCOUNTER (OUTPATIENT)
Facility: CLINIC | Age: 28
Discharge: HOME | End: 2018-02-28
Attending: ORTHOPAEDIC SURGERY | Admitting: ORTHOPAEDIC SURGERY
Payer: COMMERCIAL

## 2018-02-28 ENCOUNTER — ANESTHESIA (OUTPATIENT)
Dept: SURGERY | Facility: CLINIC | Age: 28
End: 2018-02-28
Payer: COMMERCIAL

## 2018-02-28 ENCOUNTER — ANESTHESIA EVENT (OUTPATIENT)
Dept: SURGERY | Facility: CLINIC | Age: 28
End: 2018-02-28
Payer: COMMERCIAL

## 2018-02-28 ENCOUNTER — HOSPITAL ENCOUNTER (OUTPATIENT)
Dept: GENERAL RADIOLOGY | Facility: CLINIC | Age: 28
End: 2018-02-28
Attending: ORTHOPAEDIC SURGERY | Admitting: ORTHOPAEDIC SURGERY
Payer: COMMERCIAL

## 2018-02-28 VITALS
SYSTOLIC BLOOD PRESSURE: 114 MMHG | TEMPERATURE: 98.3 F | DIASTOLIC BLOOD PRESSURE: 79 MMHG | OXYGEN SATURATION: 99 % | RESPIRATION RATE: 12 BRPM

## 2018-02-28 DIAGNOSIS — Z78.9 DEEP VEIN THROMBOSIS (DVT) PROPHYLAXIS PRESCRIBED AT DISCHARGE: ICD-10-CM

## 2018-02-28 DIAGNOSIS — S82.209A TIBIA FRACTURE: ICD-10-CM

## 2018-02-28 DIAGNOSIS — I26.99 PULMONARY EMBOLISM AND INFARCTION (H): ICD-10-CM

## 2018-02-28 DIAGNOSIS — G89.18 POST-OP PAIN: Primary | ICD-10-CM

## 2018-02-28 LAB — GLUCOSE BLDC GLUCOMTR-MCNC: 174 MG/DL (ref 70–99)

## 2018-02-28 PROCEDURE — C1713 ANCHOR/SCREW BN/BN,TIS/BN: HCPCS | Performed by: ORTHOPAEDIC SURGERY

## 2018-02-28 PROCEDURE — 25000128 H RX IP 250 OP 636: Performed by: NURSE ANESTHETIST, CERTIFIED REGISTERED

## 2018-02-28 PROCEDURE — 71000014 ZZH RECOVERY PHASE 1 LEVEL 2 FIRST HR: Performed by: ORTHOPAEDIC SURGERY

## 2018-02-28 PROCEDURE — 25000128 H RX IP 250 OP 636: Performed by: PHYSICIAN ASSISTANT

## 2018-02-28 PROCEDURE — 27792 TREATMENT OF ANKLE FRACTURE: CPT | Mod: 59 | Performed by: ORTHOPAEDIC SURGERY

## 2018-02-28 PROCEDURE — 25000128 H RX IP 250 OP 636: Performed by: ORTHOPAEDIC SURGERY

## 2018-02-28 PROCEDURE — 25000564 ZZH DESFLURANE, EA 15 MIN: Performed by: ORTHOPAEDIC SURGERY

## 2018-02-28 PROCEDURE — 71000027 ZZH RECOVERY PHASE 2 EACH 15 MINS: Performed by: ORTHOPAEDIC SURGERY

## 2018-02-28 PROCEDURE — 40000306 ZZH STATISTIC PRE PROC ASSESS II: Performed by: ORTHOPAEDIC SURGERY

## 2018-02-28 PROCEDURE — 27211024 ZZHC OR SUPPLY OTHER OPNP: Performed by: ORTHOPAEDIC SURGERY

## 2018-02-28 PROCEDURE — 27825 TREAT LOWER LEG FRACTURE: CPT | Mod: LT | Performed by: ORTHOPAEDIC SURGERY

## 2018-02-28 PROCEDURE — 37000008 ZZH ANESTHESIA TECHNICAL FEE, 1ST 30 MIN: Performed by: ORTHOPAEDIC SURGERY

## 2018-02-28 PROCEDURE — 36000065 ZZH SURGERY LEVEL 4 W FLUORO 1ST 30 MIN: Performed by: ORTHOPAEDIC SURGERY

## 2018-02-28 PROCEDURE — 82962 GLUCOSE BLOOD TEST: CPT

## 2018-02-28 PROCEDURE — 25000132 ZZH RX MED GY IP 250 OP 250 PS 637: Performed by: ORTHOPAEDIC SURGERY

## 2018-02-28 PROCEDURE — 36000063 ZZH SURGERY LEVEL 4 EA 15 ADDTL MIN: Performed by: ORTHOPAEDIC SURGERY

## 2018-02-28 PROCEDURE — 27210794 ZZH OR GENERAL SUPPLY STERILE: Performed by: ORTHOPAEDIC SURGERY

## 2018-02-28 PROCEDURE — 37000009 ZZH ANESTHESIA TECHNICAL FEE, EACH ADDTL 15 MIN: Performed by: ORTHOPAEDIC SURGERY

## 2018-02-28 PROCEDURE — 27210995 ZZH RX 272: Performed by: ORTHOPAEDIC SURGERY

## 2018-02-28 PROCEDURE — 25000125 ZZHC RX 250: Performed by: NURSE ANESTHETIST, CERTIFIED REGISTERED

## 2018-02-28 PROCEDURE — 40000277 XR SURGERY CARM FLUORO LESS THAN 5 MIN W STILLS

## 2018-02-28 DEVICE — IMP SCR SYN CORTEX 3.5X18MM SELF TAP SS 204.818
Type: IMPLANTABLE DEVICE | Site: TIBIA | Status: NON-FUNCTIONAL
Removed: 2019-09-06

## 2018-02-28 DEVICE — IMPLANTABLE DEVICE: Type: IMPLANTABLE DEVICE | Site: FIBULA | Status: FUNCTIONAL

## 2018-02-28 DEVICE — IMP SCR SYN CORTEX 3.5X16MM SELF TAP SS 204.816
Type: IMPLANTABLE DEVICE | Site: TIBIA | Status: NON-FUNCTIONAL
Removed: 2019-09-06

## 2018-02-28 DEVICE — IMP PLATE SYN 1/3 TUBULAR 93MM 08H SS 241.381
Type: IMPLANTABLE DEVICE | Site: TIBIA | Status: NON-FUNCTIONAL
Removed: 2019-09-06

## 2018-02-28 DEVICE — IMP SCR SYN CAN 4.0X20MM FT SS 206.020
Type: IMPLANTABLE DEVICE | Site: TIBIA | Status: NON-FUNCTIONAL
Removed: 2019-09-06

## 2018-02-28 DEVICE — IMP SCR SYN CAN 4.0X18MM FT SS 206.018
Type: IMPLANTABLE DEVICE | Site: TIBIA | Status: NON-FUNCTIONAL
Removed: 2019-09-06

## 2018-02-28 RX ORDER — MEPERIDINE HYDROCHLORIDE 25 MG/ML
12.5 INJECTION INTRAMUSCULAR; INTRAVENOUS; SUBCUTANEOUS
Status: DISCONTINUED | OUTPATIENT
Start: 2018-02-28 | End: 2018-02-28 | Stop reason: HOSPADM

## 2018-02-28 RX ORDER — CEFAZOLIN SODIUM 2 G/100ML
2 INJECTION, SOLUTION INTRAVENOUS
Status: COMPLETED | OUTPATIENT
Start: 2018-02-28 | End: 2018-02-28

## 2018-02-28 RX ORDER — LIDOCAINE 40 MG/G
CREAM TOPICAL
Status: DISCONTINUED | OUTPATIENT
Start: 2018-02-28 | End: 2018-02-28 | Stop reason: HOSPADM

## 2018-02-28 RX ORDER — AMOXICILLIN 250 MG
1-2 CAPSULE ORAL 2 TIMES DAILY
Qty: 30 TABLET | Refills: 0 | Status: SHIPPED | OUTPATIENT
Start: 2018-02-28 | End: 2018-04-03

## 2018-02-28 RX ORDER — ACETAMINOPHEN 325 MG/1
650 TABLET ORAL
Status: DISCONTINUED | OUTPATIENT
Start: 2018-02-28 | End: 2018-02-28 | Stop reason: HOSPADM

## 2018-02-28 RX ORDER — OXYCODONE HYDROCHLORIDE 5 MG/1
10 TABLET ORAL ONCE
Status: DISCONTINUED | OUTPATIENT
Start: 2018-02-28 | End: 2018-02-28 | Stop reason: HOSPADM

## 2018-02-28 RX ORDER — FENTANYL CITRATE 50 UG/ML
25-50 INJECTION, SOLUTION INTRAMUSCULAR; INTRAVENOUS
Status: DISCONTINUED | OUTPATIENT
Start: 2018-02-28 | End: 2018-02-28 | Stop reason: HOSPADM

## 2018-02-28 RX ORDER — LIDOCAINE HYDROCHLORIDE 20 MG/ML
INJECTION, SOLUTION INFILTRATION; PERINEURAL PRN
Status: DISCONTINUED | OUTPATIENT
Start: 2018-02-28 | End: 2018-02-28

## 2018-02-28 RX ORDER — PROPOFOL 10 MG/ML
INJECTION, EMULSION INTRAVENOUS PRN
Status: DISCONTINUED | OUTPATIENT
Start: 2018-02-28 | End: 2018-02-28

## 2018-02-28 RX ORDER — DIMENHYDRINATE 50 MG/ML
25 INJECTION, SOLUTION INTRAMUSCULAR; INTRAVENOUS
Status: DISCONTINUED | OUTPATIENT
Start: 2018-02-28 | End: 2018-02-28 | Stop reason: HOSPADM

## 2018-02-28 RX ORDER — METHOCARBAMOL 750 MG/1
750 TABLET, FILM COATED ORAL EVERY 6 HOURS PRN
Qty: 30 TABLET | Refills: 0 | Status: SHIPPED | OUTPATIENT
Start: 2018-02-28 | End: 2018-03-13

## 2018-02-28 RX ORDER — FENTANYL CITRATE 50 UG/ML
INJECTION, SOLUTION INTRAMUSCULAR; INTRAVENOUS PRN
Status: DISCONTINUED | OUTPATIENT
Start: 2018-02-28 | End: 2018-02-28

## 2018-02-28 RX ORDER — ACETAMINOPHEN 10 MG/ML
INJECTION, SOLUTION INTRAVENOUS PRN
Status: DISCONTINUED | OUTPATIENT
Start: 2018-02-28 | End: 2018-02-28

## 2018-02-28 RX ORDER — HYDROMORPHONE HYDROCHLORIDE 1 MG/ML
.3-.5 INJECTION, SOLUTION INTRAMUSCULAR; INTRAVENOUS; SUBCUTANEOUS EVERY 10 MIN PRN
Status: DISCONTINUED | OUTPATIENT
Start: 2018-02-28 | End: 2018-02-28 | Stop reason: HOSPADM

## 2018-02-28 RX ORDER — OXYCODONE HYDROCHLORIDE 5 MG/1
5-10 TABLET ORAL
Qty: 30 TABLET | Refills: 0 | Status: SHIPPED | OUTPATIENT
Start: 2018-02-28 | End: 2018-04-03

## 2018-02-28 RX ORDER — ONDANSETRON 2 MG/ML
4 INJECTION INTRAMUSCULAR; INTRAVENOUS EVERY 30 MIN PRN
Status: DISCONTINUED | OUTPATIENT
Start: 2018-02-28 | End: 2018-02-28 | Stop reason: HOSPADM

## 2018-02-28 RX ORDER — SODIUM CHLORIDE, SODIUM LACTATE, POTASSIUM CHLORIDE, CALCIUM CHLORIDE 600; 310; 30; 20 MG/100ML; MG/100ML; MG/100ML; MG/100ML
INJECTION, SOLUTION INTRAVENOUS CONTINUOUS
Status: DISCONTINUED | OUTPATIENT
Start: 2018-02-28 | End: 2018-02-28 | Stop reason: HOSPADM

## 2018-02-28 RX ORDER — OXYCODONE HYDROCHLORIDE 5 MG/1
5 TABLET ORAL
Status: COMPLETED | OUTPATIENT
Start: 2018-02-28 | End: 2018-02-28

## 2018-02-28 RX ORDER — CEFAZOLIN SODIUM 1 G/3ML
1 INJECTION, POWDER, FOR SOLUTION INTRAMUSCULAR; INTRAVENOUS SEE ADMIN INSTRUCTIONS
Status: DISCONTINUED | OUTPATIENT
Start: 2018-02-28 | End: 2018-02-28 | Stop reason: HOSPADM

## 2018-02-28 RX ORDER — NALOXONE HYDROCHLORIDE 0.4 MG/ML
.1-.4 INJECTION, SOLUTION INTRAMUSCULAR; INTRAVENOUS; SUBCUTANEOUS
Status: DISCONTINUED | OUTPATIENT
Start: 2018-02-28 | End: 2018-02-28 | Stop reason: HOSPADM

## 2018-02-28 RX ORDER — ONDANSETRON 2 MG/ML
INJECTION INTRAMUSCULAR; INTRAVENOUS PRN
Status: DISCONTINUED | OUTPATIENT
Start: 2018-02-28 | End: 2018-02-28

## 2018-02-28 RX ORDER — ONDANSETRON 4 MG/1
4 TABLET, ORALLY DISINTEGRATING ORAL EVERY 30 MIN PRN
Status: DISCONTINUED | OUTPATIENT
Start: 2018-02-28 | End: 2018-02-28 | Stop reason: HOSPADM

## 2018-02-28 RX ORDER — METHOCARBAMOL 750 MG/1
750 TABLET, FILM COATED ORAL
Status: DISCONTINUED | OUTPATIENT
Start: 2018-02-28 | End: 2018-02-28 | Stop reason: HOSPADM

## 2018-02-28 RX ORDER — VANCOMYCIN HYDROCHLORIDE 1 G/200ML
1000 INJECTION, SOLUTION INTRAVENOUS
Status: COMPLETED | OUTPATIENT
Start: 2018-02-28 | End: 2018-02-28

## 2018-02-28 RX ADMIN — FENTANYL CITRATE 50 MCG: 50 INJECTION, SOLUTION INTRAMUSCULAR; INTRAVENOUS at 11:14

## 2018-02-28 RX ADMIN — FENTANYL CITRATE 50 MCG: 50 INJECTION, SOLUTION INTRAMUSCULAR; INTRAVENOUS at 11:08

## 2018-02-28 RX ADMIN — ONDANSETRON 4 MG: 2 INJECTION INTRAMUSCULAR; INTRAVENOUS at 11:06

## 2018-02-28 RX ADMIN — HYDROMORPHONE HYDROCHLORIDE 0.5 MG: 1 INJECTION, SOLUTION INTRAMUSCULAR; INTRAVENOUS; SUBCUTANEOUS at 13:01

## 2018-02-28 RX ADMIN — FENTANYL CITRATE 50 MCG: 50 INJECTION, SOLUTION INTRAMUSCULAR; INTRAVENOUS at 11:04

## 2018-02-28 RX ADMIN — MIDAZOLAM 1 MG: 1 INJECTION INTRAMUSCULAR; INTRAVENOUS at 10:51

## 2018-02-28 RX ADMIN — SODIUM CHLORIDE, POTASSIUM CHLORIDE, SODIUM LACTATE AND CALCIUM CHLORIDE: 600; 310; 30; 20 INJECTION, SOLUTION INTRAVENOUS at 12:15

## 2018-02-28 RX ADMIN — OXYCODONE HYDROCHLORIDE 5 MG: 5 TABLET ORAL at 14:00

## 2018-02-28 RX ADMIN — HYDROMORPHONE HYDROCHLORIDE 0.5 MG: 1 INJECTION, SOLUTION INTRAMUSCULAR; INTRAVENOUS; SUBCUTANEOUS at 11:15

## 2018-02-28 RX ADMIN — Medication 100 MG: at 10:59

## 2018-02-28 RX ADMIN — ACETAMINOPHEN 1000 MG: 10 INJECTION, SOLUTION INTRAVENOUS at 12:18

## 2018-02-28 RX ADMIN — LIDOCAINE HYDROCHLORIDE 80 MG: 20 INJECTION, SOLUTION INFILTRATION; PERINEURAL at 10:59

## 2018-02-28 RX ADMIN — PROPOFOL 200 MG: 10 INJECTION, EMULSION INTRAVENOUS at 10:59

## 2018-02-28 RX ADMIN — HYDROMORPHONE HYDROCHLORIDE 0.5 MG: 1 INJECTION, SOLUTION INTRAMUSCULAR; INTRAVENOUS; SUBCUTANEOUS at 11:50

## 2018-02-28 RX ADMIN — PROPOFOL 20 MG: 10 INJECTION, EMULSION INTRAVENOUS at 11:10

## 2018-02-28 RX ADMIN — PROCHLORPERAZINE EDISYLATE 10 MG: 5 INJECTION INTRAMUSCULAR; INTRAVENOUS at 13:17

## 2018-02-28 RX ADMIN — SODIUM CHLORIDE, POTASSIUM CHLORIDE, SODIUM LACTATE AND CALCIUM CHLORIDE: 600; 310; 30; 20 INJECTION, SOLUTION INTRAVENOUS at 10:18

## 2018-02-28 RX ADMIN — CEFAZOLIN SODIUM 2 G: 2 INJECTION, SOLUTION INTRAVENOUS at 10:57

## 2018-02-28 RX ADMIN — FENTANYL CITRATE 50 MCG: 50 INJECTION, SOLUTION INTRAMUSCULAR; INTRAVENOUS at 10:54

## 2018-02-28 RX ADMIN — PROPOFOL 50 MG: 10 INJECTION, EMULSION INTRAVENOUS at 11:49

## 2018-02-28 RX ADMIN — MIDAZOLAM 1 MG: 1 INJECTION INTRAMUSCULAR; INTRAVENOUS at 10:53

## 2018-02-28 RX ADMIN — FENTANYL CITRATE 50 MCG: 50 INJECTION, SOLUTION INTRAMUSCULAR; INTRAVENOUS at 10:53

## 2018-02-28 RX ADMIN — VANCOMYCIN HYDROCHLORIDE 1 G: 1 INJECTION, SOLUTION INTRAVENOUS at 11:23

## 2018-02-28 RX ADMIN — ONDANSETRON 4 MG: 2 INJECTION INTRAMUSCULAR; INTRAVENOUS at 13:01

## 2018-02-28 ASSESSMENT — LIFESTYLE VARIABLES: TOBACCO_USE: 1
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
## 2018-02-28 NOTE — IP AVS SNAPSHOT
[facility] Phase II    [address]     [place] [address]    Phone:  [phone]                                       After Visit Summary   2/28/2018    Rafat Baird    MRN: [id number]           After Visit Summary Signature Page     I have received my discharge instructions, and my questions have been answered. I have discussed any challenges I see with this plan with the nurse or doctor.    ..........................................................................................................................................  Patient/Patient Representative Signature      ..........................................................................................................................................  Patient Representative Print Name and Relationship to Patient    ..................................................               ................................................  Date                                            Time    ..........................................................................................................................................  Reviewed by Signature/Title    ...................................................              ..............................................  Date                                                            Time

## 2018-02-28 NOTE — BRIEF OP NOTE
[facility] Orthopedic Brief Operative Note    Pre-operative diagnosis: Tibia/ fibula fracture   Post-operative diagnosis: Same   Procedure: Procedure(s):  OPEN REDUCTION INTERNAL FIXATION TIBIA  OPEN REDUCTION INTERNAL FIXATION FIBULA  APPLICATION OF SHORT LEG PLASTER SPLINT.   Surgeon: Ramiro Starkey MD   Assistant(s): Kemar Suarez   Anesthesia: General endotracheal anesthesia   Estimated blood loss: Less than 50 ml   Total IV fluids: (See anesthesia record)   Drains: None   Specimens: None   Implants: See op note   Findings:    Complications: None   Weight bearing status: Non-weight bearing   Comments: See dictated operative report for full details

## 2018-02-28 NOTE — ANESTHESIA POSTPROCEDURE EVALUATION
Patient: Rafat Baird    Procedure(s):  OPEN REDUCTION INTERNAL FIXATION LEFT DISTAL TIBIA AND FIBULA - Wound Class: I-Clean   - Wound Class: I-Clean    Diagnosis:tibia fracture  Diagnosis Additional Information: No value filed.    Anesthesia Type:  General, ETT, RSI    Note:  Anesthesia Post Evaluation    Patient location during evaluation: Phase 2  Patient participation: Able to fully participate in evaluation  Level of consciousness: awake and alert  Pain management: adequate  Airway patency: patent  Cardiovascular status: blood pressure returned to baseline  Respiratory status: spontaneous ventilation and room air  Hydration status: balanced  PONV: none     Anesthetic complications: None    Comments: Patient has some initial nausea without emesis, he was treated in Phase I PACU and is doing better now. His pain is being controlled with pain medications at this time.  He was very pleased with his anesthetic today.  No anesthesia complications.           Last vitals:  Vitals:    02/28/18 1330 02/28/18 1345 02/28/18 1400   BP: 110/67 109/70 114/79   Resp: 25 14 12   Temp:      SpO2: 97% 98% 99%         Electronically Signed By: MIGUEL Stewart CRNA  February 28, 2018  2:08 PM
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
## 2018-02-28 NOTE — DISCHARGE INSTRUCTIONS
[facility] Orthopedic Discharge Instructions For Leg Surgery.    Call the Bone and Joint Service Line for after-surgery issues:    [phone]  Pain Control: New home prescriptions   Oxycodone 5 mg tablet:  Take one or two tablets every 4-6 hours as needed for pain.    Robaxin 750 mg tablets:  Take one tablet every 8 hours for pain from muscle spasm.    Senakot s:   Stool softener.  Take as directed.           Take your pain medications as prescribed. These medications may make you sleepy. Do not drive, operate equipment, or drink alcohol when taking these.  You may take Tylenol (Generic name is acetaminophen) as directed on the bottle for additional relief or in place of the prescribed pain medications as your pain gets better. Ibuprofen,and Aleve can be used in supplement to the pain prescription and tylenol if you need . If the medications cause a reaction such as nausea or skin rash, stop taking them and contact your doctor. Please plan accordingly, pain medications will not be re-filled on the weekends or at night. Call the office during the day if you need more medications.   General Care After surgery you may feel tired/sleepy. This is normal. Please have someone stay with you for 24 hours after surgery. You should avoid driving for 1-2 days after surgery, as your reaction time may be slow. You should not drive at all if you have had surgery on your arms, right leg and/or are taking narcotic pain medications until released by your doctor. If you have any question along the way please contact the office. If you feel it is an issue cannot wait for normal office hours, contact the on-call physician.   Normal Findings after Surgery The toes may tingle for a while due to local anesthetic injected at surgery.  This should go away.  Your toes will be mildly swollen but you should be able to move them.  Low grade fevers less than 100.5 degrees Fahrenheit are normal.      Wound Care/Dressings  The splint will stay on until office follow up.      Splints can get too tight or can pinch in areas.  This will cause additional unwanted pain.   We use splints because you can unwrap the ace bandage and loosen it if  needed.  Do not be afraid to do this.  Call us if you have questions     Bathing Keep the splint dry.  Carefully cover the splint if you plan on bathing.     Diet Start with non-alcoholic liquids at first, particularly water or sports drinks after surgery. Progress to bland foods such as crackers and bread and finally to your normal diet if you have no problems.    Activity You must not walk on the splint.  We will discuss putting weight on the extremity at your office visit.   Braces    Physical Therapy    Additional Instructions    Follow up Appointment This should have already been made for you.  If not, call the office and make an appointment for 7-10 days after surgery.     Same-Day Surgery   Adult Discharge Orders & Instructions     For 24 hours after surgery    1. Get plenty of rest.  A responsible adult must stay with you for at least 24 hours after you leave the hospital.   2. Do not drive or use heavy equipment.  If you have weakness or tingling, don't drive or use heavy equipment until this feeling goes away.  3. Do not drink alcohol.  4. Avoid strenuous or risky activities.  Ask for help when climbing stairs.   5. You may feel lightheaded.  IF so, sit for a few minutes before standing.  Have someone help you get up.   6. If you have nausea (feel sick to your stomach): Drink only clear liquids such as apple juice, ginger ale, broth or 7-Up.  Rest may also help.  Be sure to drink enough fluids.  Move to a regular diet as you feel able.  7. You may have a slight fever. Call the doctor if your fever is over 100 F (37.7 C) (taken under the tongue) or lasts longer than 24 hours.  8. You may have a dry mouth, a sore throat, muscle aches or trouble sleeping.  These should go away after 24  hours.  9. Do not make important or legal decisions.   Call your doctor for any of the followin.  Signs of infection (fever, growing tenderness at the surgery site, a large amount of drainage or bleeding, severe pain, foul-smelling drainage, redness, swelling).    2. It has been over 8 to 10 hours since surgery and you are still not able to urinate (pass water).    3.  Headache for over 24 hours.      Nurse advice line: [phone]

## 2018-02-28 NOTE — ANESTHESIA CARE TRANSFER NOTE
Patient: Rafat Baird    Procedure(s):  OPEN REDUCTION INTERNAL FIXATION LEFT DISTAL TIBIA AND FIBULA - Wound Class: I-Clean   - Wound Class: I-Clean    Diagnosis: tibia fracture  Diagnosis Additional Information: No value filed.    Anesthesia Type:   General, ETT, RSI     Note:  Airway :Nasal Cannula  Patient transferred to:PACU        Vitals: (Last set prior to Anesthesia Care Transfer)    CRNA VITALS  2/28/2018 1211 - 2/28/2018 1247      2/28/2018             Pulse: 85    SpO2: 98 %                Electronically Signed By: MIGUEL Stewart CRNA  February 28, 2018  12:47 PM
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
## 2018-03-02 NOTE — PROGRESS NOTES
Pt left VM in SDS asking what to do with his dressing after having surgery. States he received no instructions on caring for it. Writer returned phone call but pt did not answer, left detailed message stating to leave drsg cdi until f/u appt.

## 2018-03-07 ENCOUNTER — TELEPHONE (OUTPATIENT)
Dept: ORTHOPEDICS | Facility: CLINIC | Age: 28
End: 2018-03-07

## 2018-03-07 NOTE — TELEPHONE ENCOUNTER
Reason for Call:  Form, our goal is to have forms completed with 72 hours, however, some forms may require a visit or additional information.    Type of letter, form or note:  disability    Who is the form from?: Insurance comp    Where did the form come from: Patient or family brought in       What clinic location was the form placed at?: [address]    Where the form was placed: 's Box    What number is listed as a contact on the form?: fax to [phone]       Additional comments: [place] dept of labor     Call taken on 3/7/2018 at 4:24 PM by Rosa Barroso

## 2018-03-13 ENCOUNTER — RADIANT APPOINTMENT (OUTPATIENT)
Dept: GENERAL RADIOLOGY | Facility: CLINIC | Age: 28
End: 2018-03-13
Attending: ORTHOPAEDIC SURGERY
Payer: COMMERCIAL

## 2018-03-13 ENCOUNTER — OFFICE VISIT (OUTPATIENT)
Dept: ORTHOPEDICS | Facility: CLINIC | Age: 28
End: 2018-03-13
Payer: COMMERCIAL

## 2018-03-13 VITALS — HEIGHT: 71 IN

## 2018-03-13 DIAGNOSIS — G89.18 POST-OP PAIN: ICD-10-CM

## 2018-03-13 DIAGNOSIS — S82.402A CLOSED FRACTURE OF LEFT TIBIA AND FIBULA, INITIAL ENCOUNTER: Primary | ICD-10-CM

## 2018-03-13 DIAGNOSIS — S82.202A CLOSED FRACTURE OF LEFT TIBIA AND FIBULA, INITIAL ENCOUNTER: ICD-10-CM

## 2018-03-13 DIAGNOSIS — S82.402A CLOSED FRACTURE OF LEFT TIBIA AND FIBULA, INITIAL ENCOUNTER: ICD-10-CM

## 2018-03-13 DIAGNOSIS — S82.202A CLOSED FRACTURE OF LEFT TIBIA AND FIBULA, INITIAL ENCOUNTER: Primary | ICD-10-CM

## 2018-03-13 PROCEDURE — 73610 X-RAY EXAM OF ANKLE: CPT | Mod: TC

## 2018-03-13 PROCEDURE — 99024 POSTOP FOLLOW-UP VISIT: CPT | Performed by: ORTHOPAEDIC SURGERY

## 2018-03-13 RX ORDER — METHOCARBAMOL 750 MG/1
750 TABLET, FILM COATED ORAL EVERY 6 HOURS PRN
Qty: 30 TABLET | Refills: 0 | Status: SHIPPED | OUTPATIENT
Start: 2018-03-13 | End: 2019-08-05

## 2018-03-13 ASSESSMENT — PAIN SCALES - GENERAL: PAINLEVEL: NO PAIN (0)

## 2018-03-13 NOTE — PROGRESS NOTES
"HISTORY OF PRESENT ILLNESS:    Rafat Baird is a 27 year old male who is seen in follow up for   Chief Complaint   Patient presents with     RECHECK     OPEN REDUCTION INTERNAL FIXATION LEFT DISTAL TIBIA AND FIBULA.  DOS: 2/28/18.  (13 days postop)     Surgical Followup     PREOPERATIVE DIAGNOSIS:  Acute fracture, left distal tibia and fibula. POSTOPERATIVE DIAGNOSIS:  Acute fracture, left distal tibia and fibula. PROCEDURE:  Closed reduction with percutaneous screw fixation of tibia fracture followed by open reduction with plate and screw fixation of the fibula fracture.   Interval: Patient reports he has not dealt with a great deal pain of the left lower extremity.  He does state he does get spasms and does request a refill of his muscle relaxant.  Family present: Dad  Patient evaluation done with Dr. Starkey    Physical Exam:  Vitals: Ht 1.803 m (5' 11\")  BMI= There is no height or weight on file to calculate BMI.  Constitutional: healthy, alert and no acute distress   Psychiatric: mentation appears normal and affect normal/bright  NEURO: no focal deficits  Location of surgery: Left ankle/lower extremity  Incision sites: Steri-Strips are intact.  Range of motion: Patient is able to mobilize his foot up and down.    IMAGING INTERPRETATION: X-ray images reveal the fracture of the tibia and fibula remains well stabilized and aligned with the screws and hardware.  Independent visualization of the images was performed.     ASSESSMENT:    Chief Complaint   Patient presents with     RECHECK     OPEN REDUCTION INTERNAL FIXATION LEFT DISTAL TIBIA AND FIBULA.  DOS: 2/28/18.  (13 days postop)     Surgical Followup     PREOPERATIVE DIAGNOSIS:  Acute fracture, left distal tibia and fibula. POSTOPERATIVE DIAGNOSIS:  Acute fracture, left distal tibia and fibula. PROCEDURE:  Closed reduction with percutaneous screw fixation of tibia fracture followed by open reduction with plate and screw fixation of the fibula fracture.       " ICD-10-CM    1. Closed fracture of left tibia and fibula, initial encounter S82.202A XR Ankle Left G/E 3 Views    S82.402A    2. Post-op pain G89.18 methocarbamol (ROBAXIN) 750 MG tablet     Patient is 13 days status post open reduction internal fixation of the left distal tibia and fibula.    Plan:   Steri-Strips are still intact.  Patient was provided with and will now be able to remove the walking boot provided to him today to be able to shower.  Steri-Strips will gradually fall off.  Patient is advised to continue a strict nonweightbearing status.  Refill pain medication: Patient reports pain medication not needed but Robaxin was refilled today  Return to clinic 3 weeks with repeat x-rays    Maame Suarez PA-C   3/13/2018  12:37 PM      I attest I have seen and evaluated the patient.  I agree with above impression and plan.    Ramiro Starkey MD

## 2018-03-13 NOTE — LETTER
"    3/13/2018         RE: Rafat Baird  [id number]    [facility] [address]        Dear Colleague,    Thank you for referring your patient, Rafat Baird, to the [facility]. Please see a copy of my visit note below.    HISTORY OF PRESENT ILLNESS:    Rafat Baird is a 27 year old male who is seen in follow up for   Chief Complaint   Patient presents with     RECHECK     OPEN REDUCTION INTERNAL FIXATION LEFT DISTAL TIBIA AND FIBULA.  DOS: 2/28/18.  (13 days postop)     Surgical Followup     PREOPERATIVE DIAGNOSIS:  Acute fracture, left distal tibia and fibula. POSTOPERATIVE DIAGNOSIS:  Acute fracture, left distal tibia and fibula. PROCEDURE:  Closed reduction with percutaneous screw fixation of tibia fracture followed by open reduction with plate and screw fixation of the fibula fracture.   Interval: Patient reports he has not dealt with a great deal pain of the left lower extremity.  He does state he does get spasms and does request a refill of his muscle relaxant.  Family present: Dad  Patient evaluation done with Dr. Starkey    Physical Exam:  Vitals: Ht 1.803 m (5' 11\")  BMI= There is no height or weight on file to calculate BMI.  Constitutional: healthy, alert and no acute distress   Psychiatric: mentation appears normal and affect normal/bright  NEURO: no focal deficits  Location of surgery: Left ankle/lower extremity  Incision sites: Steri-Strips are intact.  Range of motion: Patient is able to mobilize his foot up and down.    IMAGING INTERPRETATION: X-ray images reveal the fracture of the tibia and fibula remains well stabilized and aligned with the screws and hardware.  Independent visualization of the images was performed.     ASSESSMENT:    Chief Complaint   Patient presents with     RECHECK     OPEN REDUCTION INTERNAL FIXATION LEFT DISTAL TIBIA AND FIBULA.  DOS: 2/28/18.  (13 days postop)     Surgical Followup     PREOPERATIVE DIAGNOSIS:  Acute fracture, left distal tibia and fibula. " POSTOPERATIVE DIAGNOSIS:  Acute fracture, left distal tibia and fibula. PROCEDURE:  Closed reduction with percutaneous screw fixation of tibia fracture followed by open reduction with plate and screw fixation of the fibula fracture.       ICD-10-CM    1. Closed fracture of left tibia and fibula, initial encounter S82.202A XR Ankle Left G/E 3 Views    S82.402A    2. Post-op pain G89.18 methocarbamol (ROBAXIN) 750 MG tablet     Patient is 13 days status post open reduction internal fixation of the left distal tibia and fibula.    Plan:   Steri-Strips are still intact.  Patient was provided with and will now be able to remove the walking boot provided to him today to be able to shower.  Steri-Strips will gradually fall off.  Patient is advised to continue a strict nonweightbearing status.  Refill pain medication: Patient reports pain medication not needed but Robaxin was refilled today  Return to clinic 3 weeks with repeat x-rays    Maame Suarez PA-C   3/13/2018  12:37 PM      I attest I have seen and evaluated the patient.  I agree with above impression and plan.    Ramiro Starkey MD    Again, thank you for allowing me to participate in the care of your patient.        Sincerely,        Ramiro Starkey MD

## 2018-03-13 NOTE — MR AVS SNAPSHOT
"              After Visit Summary   3/13/2018    Rafat Baird    MRN: [id number]           Patient Information     Date Of Birth          1990        Visit Information        Provider Department      3/13/2018 10:20 AM Ramiro Starkey MD [facility]        Today's Diagnoses     Closed fracture of left tibia and fibula, initial encounter    -  1       Follow-ups after your visit        Your next 10 appointments already scheduled     Mar 13, 2018 10:20 AM CDT   [facility] with [facility]key [facility]   [facility]    [address] [place] [address] [id number] [phone]              Who to contact     If you have questions or need follow up information about today's clinic visit or your schedule please contact [facility] directly at [phone].  Normal or non-critical lab and imaging results will be communicated to you by MyChart, letter or phone within 4 business days after the clinic has received the results. If you do not hear from us within 7 days, please contact the clinic through MyChart or phone. If you have a critical or abnormal lab result, we will notify you by phone as soon as possible.  Submit refill requests through [employer] or call your pharmacy and they will forward the refill request to us. Please allow 3 business days for your refill to be completed.          Additional Information About Your Visit        MyChart Information     [employer] lets you send messages to your doctor, view your test results, renew your prescriptions, schedule appointments and more. To sign up, go to [email or web address] . Click on \"Log in\" on the left side of the screen, which will take you to the Welcome page. Then click on \"Sign up Now\" on the right side of the page.     You will be asked to enter the access code listed below, as well as some personal information. Please follow the directions to create your username and password.   " "  Your access code is: [email or web address]  Expires: 2018 10:53 PM     Your access code will  in 90 days. If you need help or a new code, please call your [place] clinic or [phone].        Care EveryWhere ID     This is your Care EveryWhere ID. This could be used by other organizations to access your [place] medical records  [id number]        Your Vitals Were     Height                   1.803 m (5' 11\")            Blood Pressure from Last 3 Encounters:   18 114/79   18 100/62   18 140/76    Weight from Last 3 Encounters:   18 77.1 kg (170 lb)   18 77.1 kg (170 lb)   18 77.1 kg (170 lb)               Primary Care Provider Office Phone # Fax #    Jose Sood [phone]       [facility] [address] [place] [address]        Equal Access to Services     APOLONIA KNIGHT : Hadii aad ku hadasho Soomaali, waaxda luqadaha, qaybta kaalmada adeegyada, waxay idiin haycase foreman . So [facility] [phone].    ATENCIÓN: Si habla español, tiene a leblanc disposición servicios gratuitos de asistencia lingüística. Llame al [phone].    We comply with applicable federal civil rights laws and [place] laws. We do not discriminate on the basis of race, color, national origin, age, disability, sex, sexual orientation, or gender identity.            Thank you!     Thank you for choosing [facility]  for your care. Our goal is always to provide you with excellent care. Hearing back from our patients is one way we can continue to improve our services. Please take a few minutes to complete the written survey that you may receive in the mail after your visit with us. Thank you!             Your Updated Medication List - Protect others around you: Learn how to safely use, store and throw away your medicines at www.disposemymeds.org.          This list is accurate as of 3/13/18 10:13 AM.  Always use your most recent med " list.                   Brand Name Dispense Instructions for use Diagnosis    ACCU-CHEK ROBSON test strip   Generic drug:  blood glucose monitoring     100 strip    Use to test blood sugars 6-7 times daily.    Type 1 diabetes, HbA1c goal < 7% (H)       * ACE/ARB/ARNI NOT PRESCRIBED (INTENTIONAL)      by Other route continuous prn.    Type 1 diabetes, HbA1c goal < 7% (H)       * ASPIRIN NOT PRESCRIBED    INTENTIONAL     by Other route continuous prn Reported on 3/2/2017        BASAGLAR 100 UNIT/ML injection     15 mL    Inject 45 Units Subcutaneous daily    Type 1 diabetes mellitus with peripheral autonomic neuropathy (H)       enoxaparin 30 MG/0.3ML injection    LOVENOX    21.56 mL    Inject 0.77 mLs (77 mg) Subcutaneous 2 times daily for 14 days    Pulmonary embolism and infarction (H), Deep vein thrombosis (DVT) prophylaxis prescribed at discharge       IBUPROFEN PO      Take 600 mg by mouth        insulin aspart 100 UNIT/ML injection    NovoLOG FLEXPEN    15 mL    INJECT SUBCUTANEOUSLY 3 TIMES DAILY (BEFORE MEALS) USE ACCORDING TO SLIDING SCALE.    Type 1 diabetes mellitus with peripheral autonomic neuropathy (H)       insulin pen needle 31G X 5 MM     100 each    1 Box See Admin Instructions Use 4 time(s) a day.    Type 1 diabetes, HbA1c goal < 7% (H), Routine general medical examination at a health care facility, Other specified idiopathic peripheral neuropathy       methocarbamol 750 MG tablet    ROBAXIN    30 tablet    Take 1 tablet (750 mg) by mouth every 6 hours as needed for muscle spasms (muscle spasm)    Post-op pain       oxyCODONE IR 5 MG tablet    ROXICODONE    30 tablet    Take 1-2 tablets (5-10 mg) by mouth every 3 hours as needed for pain or other (Moderate to Severe)    Post-op pain       senna-docusate 8.6-50 MG per tablet    SENOKOT-S;PERICOLACE    30 tablet    Take 1-2 tablets by mouth 2 times daily Take while on oral narcotics to prevent or treat constipation.    Post-op pain       * STATIN NOT  PRESCRIBED (INTENTIONAL)     0 each    Reported on 3/2/2017        * Notice:  This list has 3 medication(s) that are the same as other medications prescribed for you. Read the directions carefully, and ask your doctor or other care provider to review them with you.

## 2018-04-03 ENCOUNTER — OFFICE VISIT (OUTPATIENT)
Dept: ORTHOPEDICS | Facility: CLINIC | Age: 28
End: 2018-04-03
Payer: COMMERCIAL

## 2018-04-03 ENCOUNTER — RADIANT APPOINTMENT (OUTPATIENT)
Dept: GENERAL RADIOLOGY | Facility: CLINIC | Age: 28
End: 2018-04-03
Attending: ORTHOPAEDIC SURGERY
Payer: COMMERCIAL

## 2018-04-03 VITALS
WEIGHT: 169 LBS | BODY MASS INDEX: 23.66 KG/M2 | TEMPERATURE: 98.5 F | DIASTOLIC BLOOD PRESSURE: 78 MMHG | HEIGHT: 71 IN | SYSTOLIC BLOOD PRESSURE: 110 MMHG

## 2018-04-03 DIAGNOSIS — S82.402D CLOSED FRACTURE OF LEFT TIBIA AND FIBULA WITH ROUTINE HEALING, SUBSEQUENT ENCOUNTER: Primary | ICD-10-CM

## 2018-04-03 DIAGNOSIS — E11.40: ICD-10-CM

## 2018-04-03 DIAGNOSIS — S92.902D: ICD-10-CM

## 2018-04-03 DIAGNOSIS — S82.202D CLOSED FRACTURE OF LEFT TIBIA AND FIBULA WITH ROUTINE HEALING, SUBSEQUENT ENCOUNTER: Primary | ICD-10-CM

## 2018-04-03 DIAGNOSIS — Z71.6 TOBACCO ABUSE COUNSELING: ICD-10-CM

## 2018-04-03 PROCEDURE — 99024 POSTOP FOLLOW-UP VISIT: CPT | Performed by: PHYSICIAN ASSISTANT

## 2018-04-03 PROCEDURE — 73610 X-RAY EXAM OF ANKLE: CPT | Mod: TC

## 2018-04-03 ASSESSMENT — PAIN SCALES - GENERAL: PAINLEVEL: NO PAIN (0)

## 2018-04-03 NOTE — PROGRESS NOTES
"HISTORY OF PRESENT ILLNESS:    Rafat Baird is a 27 year old male who is seen in follow up for   Chief Complaint   Patient presents with     RECHECK     OPEN REDUCTION INTERNAL FIXATION LEFT DISTAL TIBIA AND FIBULA.  DOS: 2/28/18.  (5 weeks postop)     Surgical Followup     PREOPERATIVE DIAGNOSIS:  Acute fracture, left distal tibia and fibula. POSTOPERATIVE DIAGNOSIS:  Acute fracture, left distal tibia and fibula. PROCEDURE:  Closed reduction with percutaneous screw fixation of tibia fracture followed by open reduction with plate and screw fixation of the fibula fracture.   Interval: Patient reports he continues to do well postoperatively.  He does state his employer would like him to return to work and they do have sit down work available.  Patient has been nonweightbearing  Family present: Father   patient evaluation done with Dr. Starkey    Physical Exam:  Vitals: /78 (BP Location: Left arm, Patient Position: Chair, Cuff Size: Adult Regular)  Temp 98.5  F (36.9  C) (Temporal)  Ht 1.803 m (5' 11\")  Wt 76.7 kg (169 lb)  BMI 23.57 kg/m2  BMI= Body mass index is 23.57 kg/(m^2).  Constitutional: healthy, alert and no acute distress   Psychiatric: mentation appears normal and affect normal/bright  NEURO: no focal deficits  Location of surgery: Left lower extremity  Incision sites: Steri-Strips are still in place.  The wound does appear to be healing  Range of motion: Patient has appropriate range of motion at this time    IMAGING INTERPRETATION: X-ray images reveal very little callus present on x-ray at the tibia or the fibula.  The hardware remains well fixated.  The alignment is well-maintained.  Independent visualization of the images was performed.     ASSESSMENT:    Chief Complaint   Patient presents with     RECHECK     OPEN REDUCTION INTERNAL FIXATION LEFT DISTAL TIBIA AND FIBULA.  DOS: 2/28/18.  (5 weeks postop)     Surgical Followup     PREOPERATIVE DIAGNOSIS:  Acute fracture, left distal tibia and " fibula. POSTOPERATIVE DIAGNOSIS:  Acute fracture, left distal tibia and fibula. PROCEDURE:  Closed reduction with percutaneous screw fixation of tibia fracture followed by open reduction with plate and screw fixation of the fibula fracture.       ICD-10-CM    1. Closed fracture of left tibia and fibula with routine healing, subsequent encounter S82.202D XR Ankle Left G/E 3 Views    S82.402D    2. Tobacco abuse counseling Z71.6      Patient is 5 weeks status post open reduction internal fixation of the left ankle.  Patient's images are showing very little callus.  Patient and his dad who accompanies him are smokers.    Plan:   Patient was given written sheets as well as verbal advisement for smoking cessation.  Dad was included in on the conversation.  Patient is advised that smoking can delay healing as well as does not do well with diabetes.   Patient is advised that he can help the Steri-Strips along to be removed.  They are well fixated today.  He should remove these while in the shower..  Patient can now begin progressive weightbearing.  The process is explained to patient to gradually do this.  Return to clinic 2 weeks at which time repeat x-rays will be obtained to determine any signs of healing.    BP Readings from Last 1 Encounters:   04/03/18 110/78     BP noted to be well controlled today in office.     Maame Suarez PA-C   4/3/2018  12:31 PM      I attest I have seen and evaluated the patient.  I agree with above impression and plan.    Ramiro Starkey MD

## 2018-04-03 NOTE — LETTER
[address] [address]  [phone]          April 3, 2018    RE:  Rafat Martínezin                                                                                                                                                       [id number]    [facility] [address]            To whom it may concern:    Rafat Baird is under my professional care for fracture left lower extremity.  Patient may return to work if sit down work options are available.       Sincerely,        Ramiro Starkey MD

## 2018-04-03 NOTE — LETTER
"    4/3/2018         RE: Rafat Baird  [id number]    [facility] [address]        Dear Colleague,    Thank you for referring your patient, Rafat Baird, to the [facility]. Please see a copy of my visit note below.    HISTORY OF PRESENT ILLNESS:    Rafat Baird is a 27 year old male who is seen in follow up for   Chief Complaint   Patient presents with     RECHECK     OPEN REDUCTION INTERNAL FIXATION LEFT DISTAL TIBIA AND FIBULA.  DOS: 2/28/18.  (5 weeks postop)     Surgical Followup     PREOPERATIVE DIAGNOSIS:  Acute fracture, left distal tibia and fibula. POSTOPERATIVE DIAGNOSIS:  Acute fracture, left distal tibia and fibula. PROCEDURE:  Closed reduction with percutaneous screw fixation of tibia fracture followed by open reduction with plate and screw fixation of the fibula fracture.   Interval: Patient reports he continues to do well postoperatively.  He does state his employer would like him to return to work and they do have sit down work available.  Patient has been nonweightbearing  Family present: Father   patient evaluation done with Dr. Starkey    Physical Exam:  Vitals: /78 (BP Location: Left arm, Patient Position: Chair, Cuff Size: Adult Regular)  Temp 98.5  F (36.9  C) (Temporal)  Ht 1.803 m (5' 11\")  Wt 76.7 kg (169 lb)  BMI 23.57 kg/m2  BMI= Body mass index is 23.57 kg/(m^2).  Constitutional: healthy, alert and no acute distress   Psychiatric: mentation appears normal and affect normal/bright  NEURO: no focal deficits  Location of surgery: Left lower extremity  Incision sites: Steri-Strips are still in place.  The wound does appear to be healing  Range of motion: Patient has appropriate range of motion at this time    IMAGING INTERPRETATION: X-ray images reveal very little callus present on x-ray at the tibia or the fibula.  The hardware remains well fixated.  The alignment is well-maintained.  Independent visualization of the images was performed.     ASSESSMENT:    Chief " Complaint   Patient presents with     RECHECK     OPEN REDUCTION INTERNAL FIXATION LEFT DISTAL TIBIA AND FIBULA.  DOS: 2/28/18.  (5 weeks postop)     Surgical Followup     PREOPERATIVE DIAGNOSIS:  Acute fracture, left distal tibia and fibula. POSTOPERATIVE DIAGNOSIS:  Acute fracture, left distal tibia and fibula. PROCEDURE:  Closed reduction with percutaneous screw fixation of tibia fracture followed by open reduction with plate and screw fixation of the fibula fracture.       ICD-10-CM    1. Closed fracture of left tibia and fibula with routine healing, subsequent encounter S82.202D XR Ankle Left G/E 3 Views    S82.402D    2. Tobacco abuse counseling Z71.6      Patient is 5 weeks status post open reduction internal fixation of the left ankle.  Patient's images are showing very little callus.  Patient and his dad who accompanies him are smokers.    Plan:   Patient was given written sheets as well as verbal advisement for smoking cessation.  Dad was included in on the conversation.  Patient is advised that smoking can delay healing as well as does not do well with diabetes.   Patient is advised that he can help the Steri-Strips along to be removed.  They are well fixated today.  He should remove these while in the shower..  Patient can now begin progressive weightbearing.  The process is explained to patient to gradually do this.  Return to clinic 2 weeks at which time repeat x-rays will be obtained to determine any signs of healing.    BP Readings from Last 1 Encounters:   04/03/18 110/78     BP noted to be well controlled today in office.     Maame Suarez PA-C   4/3/2018  12:31 PM      I attest I have seen and evaluated the patient.  I agree with above impression and plan.    Ramiro Starkey MD    Again, thank you for allowing me to participate in the care of your patient.        Sincerely,        Ramiro Starkey MD

## 2018-04-03 NOTE — PATIENT INSTRUCTIONS

## 2018-04-03 NOTE — NURSING NOTE
"Chief Complaint   Patient presents with     RECHECK     OPEN REDUCTION INTERNAL FIXATION LEFT DISTAL TIBIA AND FIBULA.  DOS: 2/28/18.  (5 weeks postop)     Surgical Followup     PREOPERATIVE DIAGNOSIS:  Acute fracture, left distal tibia and fibula. POSTOPERATIVE DIAGNOSIS:  Acute fracture, left distal tibia and fibula. PROCEDURE:  Closed reduction with percutaneous screw fixation of tibia fracture followed by open reduction with plate and screw fixation of the fibula fracture.       Initial /78 (BP Location: Left arm, Patient Position: Chair, Cuff Size: Adult Regular)  Temp 98.5  F (36.9  C) (Temporal)  Ht 1.803 m (5' 11\")  Wt 76.7 kg (169 lb)  BMI 23.57 kg/m2 Estimated body mass index is 23.57 kg/(m^2) as calculated from the following:    Height as of this encounter: 1.803 m (5' 11\").    Weight as of this encounter: 76.7 kg (169 lb).  Medication Reconciliation: complete   DAJUAN Stratton  "

## 2018-04-03 NOTE — MR AVS SNAPSHOT
After Visit Summary   4/3/2018    Rafat Baird    MRN: [id number]           Patient Information     Date Of Birth          1990        Visit Information        Provider Department      4/3/2018 10:20 AM Ramiro Starkey MD [facility]        Today's Diagnoses     Closed fracture of left tibia and fibula with routine healing, subsequent encounter    -  1      Care Instructions      How to Quit Smoking  Smoking is one of the hardest habits to break. About half of all people who have ever smoked have been able to quit. Most people who still smoke want to quit. Here are some of the best ways to stop smoking.    Keep trying  Most smokers make many attempts at quitting before they are successful. It s important not to give up.  Go cold turkey  Most former smokers quit cold turkey (all at once). Trying to cut back gradually doesn't seem to work as well, perhaps because it continues the smoking habit. Also, it is possible to inhale more while smoking fewer cigarettes. This results in the same amount of nicotine in your body.  Get support  Support programs can be a big help, especially for heavy smokers. These groups offer lectures, ways to change behavior, and peer support. Here are some ways to find a support program:    Free national quitline: 800-QUIT-NOW (267-500-5191).    Hospital quit-smoking programs.    American Lung Association: (978.568.9053).    American Cancer Society (260-637-4163).  Support at home is important too. Nonsmokers can offer praise and encouragement. If the smoker in your life finds it hard to quit, encourage them to keep trying.  Over-the-counter medicines  Nicotine replacement therapy may make quitting easier. Certain aids, such as the nicotine patch, gum, and lozenges, are available without a prescription. It is best to use these under a doctor s care, though. The skin patch provides a steady supply of nicotine. Nicotine gum and lozenges give temporary bursts  "of low levels of nicotine. Both methods reduce the craving for cigarettes. Warning: If you have nausea, vomiting, dizziness, weakness, or a fast heartbeat, stop using these products and see your doctor.  Prescription medicines  After reviewing your smoking patterns and past attempts to quit, your doctor may offer a prescription medicine such as bupropion, varenicline, a nicotine inhaler, or nasal spray. Each has advantages and side effects. Your doctor can review these with you.  Health benefits of quitting  The benefits of quitting start right away and keep improving the longer you go without smoking. These benefits occur at any age.  So whether you are 17 or 70, quitting is a good decision. Some of the benefits include:    20 minutes: Blood pressure and pulse return to normal.    8 hours: Oxygen levels return to normal.    2 days: Ability to smell and taste begin to improve as damaged nerves regrow.    2 to 3 weeks: Circulation and lung function improve.    1 to 9 months: Coughing, congestion, and shortness of breath decrease; tiredness decreases.    1 year: Risk of heart attack decreases by half.    5 years: Risk of lung cancer decreases by half; risk of stroke becomes the same as a nonsmoker s.  For more on how to quit smoking, try these online resources:     Smokefree.gov    \"Clearing the Air\" booklet from the National Cancer [place]: smokefree.gov/sites/default/files/pdf/clearing-the-air-accessible.pdf  Date Last Reviewed: 3/1/2017    0615-6231 [employer]. [address] [place] [address]. All rights reserved. This information is not intended as a substitute for professional medical care. Always follow your healthcare professional's instructions.                Follow-ups after your visit        Who to contact     If you have questions or need follow up information about today's clinic visit or your schedule please contact [facility] directly at [phone].  Normal " "or non-critical lab and imaging results will be communicated to you by MyChart, letter or phone within 4 business days after the clinic has received the results. If you do not hear from us within 7 days, please contact the clinic through [employer] or phone. If you have a critical or abnormal lab result, we will notify you by phone as soon as possible.  Submit refill requests through [employer] or call your pharmacy and they will forward the refill request to us. Please allow 3 business days for your refill to be completed.          Additional Information About Your Visit        [employer] Information     [employer] lets you send messages to your doctor, view your test results, renew your prescriptions, schedule appointments and more. To sign up, go to [email or web address] . Click on \"Log in\" on the left side of the screen, which will take you to the Welcome page. Then click on \"Sign up Now\" on the right side of the page.     You will be asked to enter the access code listed below, as well as some personal information. Please follow the directions to create your username and password.     Your access code is: [email or web address]  Expires: 2018 10:53 PM     Your access code will  in 90 days. If you need help or a new code, please call your [place] clinic or [phone].        Care EveryWhere ID     This is your Care EveryWhere ID. This could be used by other organizations to access your [place] medical records  [id number]        Your Vitals Were     Temperature Height BMI (Body Mass Index)             98.5  F (36.9  C) (Temporal) 1.803 m (5' 11\") 23.57 kg/m2          Blood Pressure from Last 3 Encounters:   18 110/78   18 114/79   18 100/62    Weight from Last 3 Encounters:   18 76.7 kg (169 lb)   18 77.1 kg (170 lb)   18 77.1 kg (170 lb)               Primary Care Provider Office Phone # Fax #    Jose Sood [phone]       [address] [facility]" [place] [address]        Equal Access to Services     [facility] EUGENE : Hadii miri verdugo mirandaroque Tramali, wanileshda luqzafarha, qasofíata kazohaibraymond joyner, americo rosegaylajyoti lacy. So [facility] [place] [facility] [phone].    ATENCIÓN: Si habla español, tiene a leblanc disposición servicios gratuitos de asistencia lingüística. Llame al [phone].    We comply with applicable federal civil rights laws and [place] laws. We do not discriminate on the basis of race, color, national origin, age, disability, sex, sexual orientation, or gender identity.            Thank you!     Thank you for choosing [facility]  for your care. Our goal is always to provide you with excellent care. Hearing back from our patients is one way we can continue to improve our services. Please take a few minutes to complete the written survey that you may receive in the mail after your visit with us. Thank you!             Your Updated Medication List - Protect others around you: Learn how to safely use, store and throw away your medicines at www.disposemymeds.org.          This list is accurate as of 4/3/18 10:49 AM.  Always use your most recent med list.                   Brand Name Dispense Instructions for use Diagnosis    ACCU-CHEK ROBSON test strip   Generic drug:  blood glucose monitoring     100 strip    Use to test blood sugars 6-7 times daily.    Type 1 diabetes, HbA1c goal < 7% (H)       * ACE/ARB/ARNI NOT PRESCRIBED (INTENTIONAL)      by Other route continuous prn.    Type 1 diabetes, HbA1c goal < 7% (H)       * ASPIRIN NOT PRESCRIBED    INTENTIONAL     by Other route continuous prn Reported on 3/2/2017        BASAGLAR 100 UNIT/ML injection     15 mL    Inject 45 Units Subcutaneous daily    Type 1 diabetes mellitus with peripheral autonomic neuropathy (H)       IBUPROFEN PO      Take 600 mg by mouth        insulin aspart 100 UNIT/ML injection    NovoLOG FLEXPEN    15 mL    INJECT SUBCUTANEOUSLY 3 TIMES DAILY (BEFORE MEALS) USE ACCORDING  TO SLIDING SCALE.    Type 1 diabetes mellitus with peripheral autonomic neuropathy (H)       insulin pen needle 31G X 5 MM     100 each    1 Box See Admin Instructions Use 4 time(s) a day.    Type 1 diabetes, HbA1c goal < 7% (H), Routine general medical examination at a health care facility, Other specified idiopathic peripheral neuropathy       methocarbamol 750 MG tablet    ROBAXIN    30 tablet    Take 1 tablet (750 mg) by mouth every 6 hours as needed for muscle spasms (muscle spasm)    Post-op pain       * STATIN NOT PRESCRIBED (INTENTIONAL)     0 each    Reported on 3/2/2017        * Notice:  This list has 3 medication(s) that are the same as other medications prescribed for you. Read the directions carefully, and ask your doctor or other care provider to review them with you.

## 2018-04-17 ENCOUNTER — RADIANT APPOINTMENT (OUTPATIENT)
Dept: GENERAL RADIOLOGY | Facility: CLINIC | Age: 28
End: 2018-04-17
Attending: ORTHOPAEDIC SURGERY
Payer: COMMERCIAL

## 2018-04-17 ENCOUNTER — OFFICE VISIT (OUTPATIENT)
Dept: ORTHOPEDICS | Facility: CLINIC | Age: 28
End: 2018-04-17
Payer: COMMERCIAL

## 2018-04-17 VITALS
BODY MASS INDEX: 23.66 KG/M2 | TEMPERATURE: 98 F | HEIGHT: 71 IN | WEIGHT: 169 LBS | SYSTOLIC BLOOD PRESSURE: 102 MMHG | DIASTOLIC BLOOD PRESSURE: 60 MMHG

## 2018-04-17 DIAGNOSIS — S82.402D CLOSED FRACTURE OF LEFT TIBIA AND FIBULA WITH ROUTINE HEALING, SUBSEQUENT ENCOUNTER: Primary | ICD-10-CM

## 2018-04-17 DIAGNOSIS — S82.202D CLOSED FRACTURE OF LEFT TIBIA AND FIBULA WITH ROUTINE HEALING, SUBSEQUENT ENCOUNTER: Primary | ICD-10-CM

## 2018-04-17 DIAGNOSIS — S92.902D: ICD-10-CM

## 2018-04-17 DIAGNOSIS — E11.40: ICD-10-CM

## 2018-04-17 PROCEDURE — 99024 POSTOP FOLLOW-UP VISIT: CPT | Performed by: ORTHOPAEDIC SURGERY

## 2018-04-17 PROCEDURE — 73610 X-RAY EXAM OF ANKLE: CPT | Mod: TC

## 2018-04-17 ASSESSMENT — PAIN SCALES - GENERAL: PAINLEVEL: NO PAIN (0)

## 2018-04-17 NOTE — MR AVS SNAPSHOT
"              After Visit Summary   2018    Rafat Baird    MRN: [id number]           Patient Information     Date Of Birth          1990        Visit Information        Provider Department      2018 8:10 AM Ramiro Starkey MD [facility]        Today's Diagnoses     Open fracture of bone of foot affected by diabetic neuropathy, left, with routine healing, subsequent encounter (H)    -  1       Follow-ups after your visit        Who to contact     If you have questions or need follow up information about today's clinic visit or your schedule please contact [facility] directly at [phone].  Normal or non-critical lab and imaging results will be communicated to you by MyChart, letter or phone within 4 business days after the clinic has received the results. If you do not hear from us within 7 days, please contact the clinic through [employer] or phone. If you have a critical or abnormal lab result, we will notify you by phone as soon as possible.  Submit refill requests through [employer] or call your pharmacy and they will forward the refill request to us. Please allow 3 business days for your refill to be completed.          Additional Information About Your Visit        MyChart Information     [employer] lets you send messages to your doctor, view your test results, renew your prescriptions, schedule appointments and more. To sign up, go to [email or web address] . Click on \"Log in\" on the left side of the screen, which will take you to the Welcome page. Then click on \"Sign up Now\" on the right side of the page.     You will be asked to enter the access code listed below, as well as some personal information. Please follow the directions to create your username and password.     Your access code is: [email or web address]  Expires: 2018 10:53 PM     Your access code will  in 90 days. If you need help or a new code, please call your [facility] or [phone].      " "  Care EveryWhere ID     This is your Care EveryWhere ID. This could be used by other organizations to access your [place] medical records  [id number]        Your Vitals Were     Temperature Height BMI (Body Mass Index)             98  F (36.7  C) (Temporal) 1.803 m (5' 11\") 23.57 kg/m2          Blood Pressure from Last 3 Encounters:   04/17/18 102/60   04/03/18 110/78   02/28/18 114/79    Weight from Last 3 Encounters:   04/17/18 76.7 kg (169 lb)   04/03/18 76.7 kg (169 lb)   02/26/18 77.1 kg (170 lb)               Primary Care Provider Office Phone # Fax #    Jose Sood [phone] [address] [facility] [address]        Equal Access to Services     [facility] : Hadii miri verdugo hadasho Soomaali, waaxda luqadaha, qaybta kaalmada adeegyada, americo foreman . So [facility] [phone].    ATENCIÓN: Si habla español, tiene a leblanc disposición servicios gratuitos de asistencia lingüística. Ryan al [phone].    We comply with applicable federal civil rights laws and [place] laws. We do not discriminate on the basis of race, color, national origin, age, disability, sex, sexual orientation, or gender identity.            Thank you!     Thank you for choosing [facility]  for your care. Our goal is always to provide you with excellent care. Hearing back from our patients is one way we can continue to improve our services. Please take a few minutes to complete the written survey that you may receive in the mail after your visit with us. Thank you!             Your Updated Medication List - Protect others around you: Learn how to safely use, store and throw away your medicines at www.disposemymeds.org.          This list is accurate as of 4/17/18  8:20 AM.  Always use your most recent med list.                   Brand Name Dispense Instructions for use Diagnosis    ACCU-CHEK ROBSON test strip   Generic drug:  blood glucose monitoring     100 " strip    Use to test blood sugars 6-7 times daily.    Type 1 diabetes, HbA1c goal < 7% (H)       * ACE/ARB/ARNI NOT PRESCRIBED (INTENTIONAL)      by Other route continuous prn.    Type 1 diabetes, HbA1c goal < 7% (H)       * ASPIRIN NOT PRESCRIBED    INTENTIONAL     by Other route continuous prn Reported on 3/2/2017        BASAGLAR 100 UNIT/ML injection     15 mL    Inject 45 Units Subcutaneous daily    Type 1 diabetes mellitus with peripheral autonomic neuropathy (H)       IBUPROFEN PO      Take 600 mg by mouth        insulin aspart 100 UNIT/ML injection    NovoLOG FLEXPEN    15 mL    INJECT SUBCUTANEOUSLY 3 TIMES DAILY (BEFORE MEALS) USE ACCORDING TO SLIDING SCALE.    Type 1 diabetes mellitus with peripheral autonomic neuropathy (H)       insulin pen needle 31G X 5 MM     100 each    1 Box See Admin Instructions Use 4 time(s) a day.    Type 1 diabetes, HbA1c goal < 7% (H), Routine general medical examination at a health care facility, Other specified idiopathic peripheral neuropathy       methocarbamol 750 MG tablet    ROBAXIN    30 tablet    Take 1 tablet (750 mg) by mouth every 6 hours as needed for muscle spasms (muscle spasm)    Post-op pain       * STATIN NOT PRESCRIBED (INTENTIONAL)     0 each    Reported on 3/2/2017        * Notice:  This list has 3 medication(s) that are the same as other medications prescribed for you. Read the directions carefully, and ask your doctor or other care provider to review them with you.

## 2018-04-17 NOTE — NURSING NOTE
"Chief Complaint   Patient presents with     RECHECK     OPEN REDUCTION INTERNAL FIXATION LEFT DISTAL TIBIA AND FIBULA.  DOS: 2/28/18.  (7 weeks postop)     Surgical Followup     PREOPERATIVE DIAGNOSIS:  Acute fracture, left distal tibia and fibula. POSTOPERATIVE DIAGNOSIS:  Acute fracture, left distal tibia and fibula. PROCEDURE:  Closed reduction with percutaneous screw fixation of tibia fracture followed by open reduction with plate and screw fixation of the fibula fracture.       Initial /60 (BP Location: Right arm, Patient Position: Chair, Cuff Size: Adult Regular)  Temp 98  F (36.7  C) (Temporal)  Ht 1.803 m (5' 11\")  Wt 76.7 kg (169 lb)  BMI 23.57 kg/m2 Estimated body mass index is 23.57 kg/(m^2) as calculated from the following:    Height as of this encounter: 1.803 m (5' 11\").    Weight as of this encounter: 76.7 kg (169 lb).  Medication Reconciliation: complete   Parris/DAJUAN     "

## 2018-04-17 NOTE — PROGRESS NOTES
"HISTORY OF PRESENT ILLNESS:    Rafat Baird is a 27 year old male who is seen in follow up for   Chief Complaint   Patient presents with     RECHECK     OPEN REDUCTION INTERNAL FIXATION LEFT DISTAL TIBIA AND FIBULA.  DOS: 2/28/18.  (7 weeks postop)     Surgical Followup     PREOPERATIVE DIAGNOSIS:  Acute fracture, left distal tibia and fibula. POSTOPERATIVE DIAGNOSIS:  Acute fracture, left distal tibia and fibula. PROCEDURE:  Closed reduction with percutaneous screw fixation of tibia fracture followed by open reduction with plate and screw fixation of the fibula fracture.     Treatments tried to this point: Patient has begun partial weightbearing.  He however does admit that he was full weightbearing in the house yesterday.  He has been weightbearing in the brace.  Present symptoms: Only statement is that he notices some clicking and catching in his ankle but not at the fracture site.  He also admits that he has decreased his smoking to 3 cigarettes a day and today he has had none.      PHYSICAL EXAM:  /60 (BP Location: Right arm, Patient Position: Chair, Cuff Size: Adult Regular)  Temp 98  F (36.7  C) (Temporal)  Ht 1.803 m (5' 11\")  Wt 76.7 kg (169 lb)  BMI 23.57 kg/m2  Body mass index is 23.57 kg/(m^2).       Physical Exam:  Vitals: /60 (BP Location: Right arm, Patient Position: Chair, Cuff Size: Adult Regular)  Temp 98  F (36.7  C) (Temporal)  Ht 1.803 m (5' 11\")  Wt 76.7 kg (169 lb)  BMI 23.57 kg/m2  BMI= Body mass index is 23.57 kg/(m^2).  Constitutional: healthy, alert and no acute distress   Psychiatric: mentation appears normal and affect normal/bright    JOINT/EXTREMITIES:  NEURO: no focal deficits  Affected extremity pulses are easily palpable.  SKIN: no excoriation or erythema. No signs of infection. Surgical wound is healthy.        GAIT: Very limited because he has been using the boot and been nonweightbearing.        Swelling: Minimal.    ROM: His passive range of motion is at the " ankle joint good.  His subtalar range of motion is good.  It is not normal.    Strength: The only real active motion at the ankle is plantar flexion.      IMAGING INTERPRETATION: X-rays were repeated today and compared to his previous x-rays.  With careful review there does not appear to be any real change in the fracture alignment.  There is now the beginning of bone formation.     ASSESSMENT:    ICD-10-CM    1. Closed fracture of left tibia and fibula with routine healing, subsequent encounter S82.202D     S82.402D        Patient is weightbearing.  The alignment is unchanged.  His x-rays are now showing some early bone formation.    At least at this time he admits to decreasing his tobacco usage.    PLAN:        Physical Therapy/Activity: He will continue with weightbearing as tolerated in the [employer] boot.    Return to Work: He cannot return to work at this time.    We encouraged him to today with his smoking cessation    Return to clinic 4, weeks, repeat x-ray of the tibia prior to being seen.    Ramiro Starkey MD

## 2018-04-17 NOTE — LETTER
"    4/17/2018         RE: Rafat Baird  [id number]    [facility] [address]        Dear Colleague,    Thank you for referring your patient, Rafat Baird, to the [facility]. Please see a copy of my visit note below.    HISTORY OF PRESENT ILLNESS:    Rafat Baird is a 27 year old male who is seen in follow up for   Chief Complaint   Patient presents with     RECHECK     OPEN REDUCTION INTERNAL FIXATION LEFT DISTAL TIBIA AND FIBULA.  DOS: 2/28/18.  (7 weeks postop)     Surgical Followup     PREOPERATIVE DIAGNOSIS:  Acute fracture, left distal tibia and fibula. POSTOPERATIVE DIAGNOSIS:  Acute fracture, left distal tibia and fibula. PROCEDURE:  Closed reduction with percutaneous screw fixation of tibia fracture followed by open reduction with plate and screw fixation of the fibula fracture.     Treatments tried to this point: Patient has begun partial weightbearing.  He however does admit that he was full weightbearing in the house yesterday.  He has been weightbearing in the brace.  Present symptoms: Only statement is that he notices some clicking and catching in his ankle but not at the fracture site.  He also admits that he has decreased his smoking to 3 cigarettes a day and today he has had none.      PHYSICAL EXAM:  /60 (BP Location: Right arm, Patient Position: Chair, Cuff Size: Adult Regular)  Temp 98  F (36.7  C) (Temporal)  Ht 1.803 m (5' 11\")  Wt 76.7 kg (169 lb)  BMI 23.57 kg/m2  Body mass index is 23.57 kg/(m^2).       Physical Exam:  Vitals: /60 (BP Location: Right arm, Patient Position: Chair, Cuff Size: Adult Regular)  Temp 98  F (36.7  C) (Temporal)  Ht 1.803 m (5' 11\")  Wt 76.7 kg (169 lb)  BMI 23.57 kg/m2  BMI= Body mass index is 23.57 kg/(m^2).  Constitutional: healthy, alert and no acute distress   Psychiatric: mentation appears normal and affect normal/bright    JOINT/EXTREMITIES:  NEURO: no focal deficits  Affected extremity pulses are easily palpable.  SKIN: no " excoriation or erythema. No signs of infection. Surgical wound is healthy.        GAIT: Very limited because he has been using the boot and been nonweightbearing.        Swelling: Minimal.    ROM: His passive range of motion is at the ankle joint good.  His subtalar range of motion is good.  It is not normal.    Strength: The only real active motion at the ankle is plantar flexion.      IMAGING INTERPRETATION: X-rays were repeated today and compared to his previous x-rays.  With careful review there does not appear to be any real change in the fracture alignment.  There is now the beginning of bone formation.     ASSESSMENT:    ICD-10-CM    1. Closed fracture of left tibia and fibula with routine healing, subsequent encounter S82.202D     S82.402D        Patient is weightbearing.  The alignment is unchanged.  His x-rays are now showing some early bone formation.    At least at this time he admits to decreasing his tobacco usage.    PLAN:        Physical Therapy/Activity: He will continue with weightbearing as tolerated in the [employer] boot.    Return to Work: He cannot return to work at this time.    We encouraged him to today with his smoking cessation    Return to clinic 4, weeks, repeat x-ray of the tibia prior to being seen.    Ramiro Starkey MD              Again, thank you for allowing me to participate in the care of your patient.        Sincerely,        Ramiro Starkey MD

## 2018-05-10 ENCOUNTER — TELEPHONE (OUTPATIENT)
Dept: FAMILY MEDICINE | Facility: CLINIC | Age: 28
End: 2018-05-10

## 2018-05-10 NOTE — LETTER
[address] [address]  Phone: [phone]  Fax: [phone]      May 10, 2018      Rafat Baird  [id number]    [facility] [address]            Dear Rafat Baird,      In order to ensure we are providing the best quality care, we have reviewed your chart and see that you are due for diabetic recheck with your provider.    If you are being seen at another facility please let us know so that we may update your chart. To schedule an appointment please call the clinic at your earliest convenience .    Thank you for trusting us with your health care.          Sincerely,        Jose Sood M.D. Care Team

## 2018-05-10 NOTE — TELEPHONE ENCOUNTER
Panel Management Review      Patient has the following on his problem list:     Depression / Dysthymia review    Measure:  Needs PHQ-9 score of 4 or less during index window.  Administer PHQ-9 and if score is 5 or more, send encounter to provider for next steps.    5 - 7 month window range: 4-6/2018    PHQ-9 SCORE 10/29/2014 10/12/2015 10/24/2016   Total Score 4 - -   Total Score - 3 2       If PHQ-9 recheck is 5 or more, route to provider for next steps.    Patient is due for:  PHQ9    Diabetes    ASA: Not Required     Last A1C  Lab Results   Component Value Date    A1C 8.5 11/03/2017    A1C 8.6 03/30/2017    A1C 8.4 10/12/2015    A1C 8.0 08/04/2014    A1C 7.4 11/20/2013     A1C tested: FAILED    Last LDL:    Lab Results   Component Value Date    CHOL 144 03/30/2017     Lab Results   Component Value Date    HDL 37 03/30/2017     Lab Results   Component Value Date    LDL 92 03/30/2017     Lab Results   Component Value Date    TRIG 77 03/30/2017     Lab Results   Component Value Date    CHOLHDLRATIO 3.7 10/12/2015     Lab Results   Component Value Date    NHDL 107 03/30/2017       Is the patient on a Statin? NO             Is the patient on Aspirin? NO    Medications     HMG CoA Reductase Inhibitors    STATIN NOT PRESCRIBED, INTENTIONAL,          Last three blood pressure readings:  BP Readings from Last 3 Encounters:   04/17/18 102/60   04/03/18 110/78   02/28/18 114/79       Date of last diabetes office visit: 11/17     Tobacco History:     History   Smoking Status     Current Every Day Smoker     Packs/day: 0.25     Years: 1.00   Smokeless Tobacco     Never Used           Composite cancer screening  Chart review shows that this patient is due/due soon for the following None  Summary:    Patient is due/failing the follow:  Labs, diabetic recheck  Action needed:   Patient needs office visit for diabetes.    Type of outreach:    Sent letter. no machine, no message    Questions for provider review:    None                                                                                                                                     ACase/MA       Chart routed to Care Team .

## 2018-05-14 DIAGNOSIS — E10.43 TYPE 1 DIABETES MELLITUS WITH PERIPHERAL AUTONOMIC NEUROPATHY (H): ICD-10-CM

## 2018-05-14 NOTE — TELEPHONE ENCOUNTER
"Requested Prescriptions   Pending Prescriptions Disp Refills     NOVOLOG FLEXPEN 100 UNIT/ML soln [Pharmacy Med Name: NOVOLOG FLEXPEN 100UNIT/ML INJ]  0     Sig: INJECT SUBCUTANEOUSLY THREE TIMES DAILY BEFORE MEALS ACCORDING TO SLIDING SCALE    Short Acting Insulin Protocol Failed    5/14/2018 10:34 AM       Failed - LDL on file in past 12 months    Recent Labs   Lab Test  03/30/17   0925   LDL  92            Failed - Microalbumin on file in past 12 months    Recent Labs   Lab Test  03/30/17   0934   MICROL  6   UMALCR  27.31*            Failed - HgbA1C in past 3 or 6 months    If HgbA1C is 8 or greater, it needs to be on file within the past 3 months.  If less than 8, must be on file within the past 6 months.     Recent Labs   Lab Test  11/03/17   0849   A1C  8.5*            Passed - Blood pressure less than 140/90 in past 6 months    BP Readings from Last 3 Encounters:   04/17/18 102/60   04/03/18 110/78   02/28/18 114/79                Passed - Serum creatinine on file in past 12 months    Recent Labs   Lab Test  02/26/18   1608   CR  0.77            Passed - Patient is age 18 or older       Passed - Recent (6 mo) or future (30 days) visit within the authorizing provider's specialty    Patient had office visit in the last 6 months or has a visit in the next 30 days with authorizing provider or within the authorizing provider's specialty.  See \"Patient Info\" tab in inbasket, or \"Choose Columns\" in Meds & Orders section of the refill encounter.              Last Written Prescription Date:  10/25/17  Last Fill Quantity: 15 mL:,  # refills: 3   Last Office Visit with [facility], [facility] or [facility] prescribing provider:  11/3/17   Future Office Visit:    Next 5 appointments (look out 90 days)     May 15, 2018  8:10 AM CDT   [facility]    [address] [facility] [address] [phone]                   "

## 2018-05-15 ENCOUNTER — RADIANT APPOINTMENT (OUTPATIENT)
Dept: GENERAL RADIOLOGY | Facility: CLINIC | Age: 28
End: 2018-05-15
Attending: ORTHOPAEDIC SURGERY
Payer: COMMERCIAL

## 2018-05-15 ENCOUNTER — OFFICE VISIT (OUTPATIENT)
Dept: ORTHOPEDICS | Facility: CLINIC | Age: 28
End: 2018-05-15
Payer: COMMERCIAL

## 2018-05-15 VITALS
HEIGHT: 71 IN | TEMPERATURE: 97.5 F | SYSTOLIC BLOOD PRESSURE: 124 MMHG | WEIGHT: 169 LBS | DIASTOLIC BLOOD PRESSURE: 60 MMHG | BODY MASS INDEX: 23.66 KG/M2

## 2018-05-15 DIAGNOSIS — S82.202D CLOSED FRACTURE OF LEFT TIBIA AND FIBULA WITH ROUTINE HEALING, SUBSEQUENT ENCOUNTER: Primary | ICD-10-CM

## 2018-05-15 DIAGNOSIS — S82.402D CLOSED FRACTURE OF LEFT TIBIA AND FIBULA WITH ROUTINE HEALING, SUBSEQUENT ENCOUNTER: Primary | ICD-10-CM

## 2018-05-15 DIAGNOSIS — S82.202D CLOSED FRACTURE OF LEFT TIBIA AND FIBULA WITH ROUTINE HEALING, SUBSEQUENT ENCOUNTER: ICD-10-CM

## 2018-05-15 DIAGNOSIS — S82.402D CLOSED FRACTURE OF LEFT TIBIA AND FIBULA WITH ROUTINE HEALING, SUBSEQUENT ENCOUNTER: ICD-10-CM

## 2018-05-15 PROCEDURE — 99024 POSTOP FOLLOW-UP VISIT: CPT | Performed by: PHYSICIAN ASSISTANT

## 2018-05-15 PROCEDURE — 73610 X-RAY EXAM OF ANKLE: CPT | Mod: TC

## 2018-05-15 RX ORDER — INSULIN ASPART 100 [IU]/ML
INJECTION, SOLUTION INTRAVENOUS; SUBCUTANEOUS
Qty: 30 ML | Refills: 0 | Status: SHIPPED | OUTPATIENT
Start: 2018-05-15 | End: 2018-07-06

## 2018-05-15 ASSESSMENT — PAIN SCALES - GENERAL: PAINLEVEL: NO PAIN (0)

## 2018-05-15 NOTE — PATIENT INSTRUCTIONS

## 2018-05-15 NOTE — LETTER
[address] [address]  [phone]          May 15, 2018    RE:  Rafat Baird                                                                                                                                                       [id number]    [facility] [address]            To whom it may concern:    Rafat Baird is under my professional care for left ankle fracture. Patient may return to work with restriction of no more than 2-3 hours on feet per day and no lift or carry greater than 20 pounds.      Sincerely,        Ramiro Starkey MD

## 2018-05-15 NOTE — TELEPHONE ENCOUNTER
Medication is being filled for 1 time refill only due to:  Patient needs to be seen because due for 6 month diabetes recheck.. Sending to scheduling for outreach.    Lisa Sood RN

## 2018-05-15 NOTE — NURSING NOTE
"Rafat Baird is a 28 year old male who presents for:  Chief Complaint   Patient presents with     RECHECK     OPEN REDUCTION INTERNAL FIXATION LEFT DISTAL TIBIA AND FIBULA.  DOS: 2/28/18.  (10 weeks postop)     Surgical Followup     PREOPERATIVE DIAGNOSIS:  Acute fracture, left distal tibia and fibula. POSTOPERATIVE DIAGNOSIS:  Acute fracture, left distal tibia and fibula. PROCEDURE:  Closed reduction with percutaneous screw fixation of tibia fracture followed by open reduction with plate and screw fixation of the fibula fracture.        Initial Vitals:  /60 (BP Location: Right arm, Patient Position: Sitting, Cuff Size: Adult Large)  Temp 97.5  F (36.4  C) (Temporal)  Ht 1.803 m (5' 11\")  Wt 76.7 kg (169 lb)  BMI 23.57 kg/m2 Estimated body mass index is 23.57 kg/(m^2) as calculated from the following:    Height as of this encounter: 1.803 m (5' 11\").    Weight as of this encounter: 76.7 kg (169 lb).. Body surface area is 1.96 meters squared. BP completed using cuff size: large  No Pain (0)    Do you feel safe in your environment?  Yes  Do you need any refills today? No    Nursing Comments:         Felicity Kelly CMA  "

## 2018-05-15 NOTE — MR AVS SNAPSHOT
After Visit Summary   5/15/2018    Rafat Baird    MRN: [id number]           Patient Information     Date Of Birth          1990        Visit Information        Provider Department      5/15/2018 8:10 AM Ramiro Starkey MD [facility]        Today's Diagnoses     Closed fracture of left tibia and fibula with routine healing, subsequent encounter    -  1      Care Instructions      How to Quit Smoking  Smoking is one of the hardest habits to break. About half of all people who have ever smoked have been able to quit. Most people who still smoke want to quit. Here are some of the best ways to stop smoking.    Keep trying  Most smokers make many attempts at quitting before they are successful. It s important not to give up.  Go cold turkey  Most former smokers quit cold turkey (all at once). Trying to cut back gradually doesn't seem to work as well, perhaps because it continues the smoking habit. Also, it is possible to inhale more while smoking fewer cigarettes. This results in the same amount of nicotine in your body.  Get support  Support programs can be a big help, especially for heavy smokers. These groups offer lectures, ways to change behavior, and peer support. Here are some ways to find a support program:    Free national quitline: 800-QUIT-NOW (294-747-9225).    Hospital quit-smoking programs.    American Lung Association: (915.799.2324).    American Cancer Society (121-560-0821).  Support at home is important too. Nonsmokers can offer praise and encouragement. If the smoker in your life finds it hard to quit, encourage them to keep trying.  Over-the-counter medicines  Nicotine replacement therapy may make quitting easier. Certain aids, such as the nicotine patch, gum, and lozenges, are available without a prescription. It is best to use these under a doctor s care, though. The skin patch provides a steady supply of nicotine. Nicotine gum and lozenges give temporary  "bursts of low levels of nicotine. Both methods reduce the craving for cigarettes. Warning: If you have nausea, vomiting, dizziness, weakness, or a fast heartbeat, stop using these products and see your doctor.  Prescription medicines  After reviewing your smoking patterns and past attempts to quit, your doctor may offer a prescription medicine such as bupropion, varenicline, a nicotine inhaler, or nasal spray. Each has advantages and side effects. Your doctor can review these with you.  Health benefits of quitting  The benefits of quitting start right away and keep improving the longer you go without smoking. These benefits occur at any age.  So whether you are 17 or 70, quitting is a good decision. Some of the benefits include:    20 minutes: Blood pressure and pulse return to normal.    8 hours: Oxygen levels return to normal.    2 days: Ability to smell and taste begin to improve as damaged nerves regrow.    2 to 3 weeks: Circulation and lung function improve.    1 to 9 months: Coughing, congestion, and shortness of breath decrease; tiredness decreases.    1 year: Risk of heart attack decreases by half.    5 years: Risk of lung cancer decreases by half; risk of stroke becomes the same as a nonsmoker s.  For more on how to quit smoking, try these online resources:     Smokefree.gov    \"Clearing the Air\" booklet from the National Cancer [place]: smokefree.gov/sites/default/files/pdf/clearing-the-air-accessible.pdf  Date Last Reviewed: 3/1/2017    7483-4387 [employer]. [address] [place] [address]. All rights reserved. This information is not intended as a substitute for professional medical care. Always follow your healthcare professional's instructions.                Follow-ups after your visit        Your next 10 appointments already scheduled     May 15, 2018  8:10 AM CDT   Return Visit with Ramiro Starkey MD   [facility] ([facility])    [address] [id number] [phone]              Who to contact     If you have questions or need follow up information about today's clinic visit or your schedule please contact [facility] directly at [phone].  Normal or non-critical lab and imaging results will be communicated to you by MyChart, letter or phone within 4 business days after the clinic has received the results. If you do not hear from us within 7 days, please contact the clinic through MyChart or phone. If you have a critical or abnormal lab result, we will notify you by phone as soon as possible.  Submit refill requests through [employer] or call your pharmacy and they will forward the refill request to us. Please allow 3 business days for your refill to be completed.          Additional Information About Your Visit        MyChart Information     [employer] lets you send messages to your doctor, view your test results, renew your prescriptions, schedule appointments and more. To sign up, go to [email or web address] . Click on \"Log in\" on the left side of the screen, which will take you to the Welcome page. Then click on \"Sign up Now\" on the right side of the page.     You will be asked to enter the access code listed below, as well as some personal information. Please follow the directions to create your username and password.     Your access code is: [email or web address]  Expires: 2018 10:53 PM     Your access code will  in 90 days. If you need help or a new code, please call your [facility] or [phone].        Care EveryWhere ID     This is your Care EveryWhere ID. This could be used by other organizations to access your [facility] medical records  [id number]        Your Vitals Were     Temperature Height BMI (Body Mass Index)             97.5  F (36.4  C) (Temporal) 1.803 m (5' 11\") 23.57 kg/m2          Blood Pressure from Last 3 Encounters:   05/15/18 124/60   18 102/60   18 110/78    " Weight from Last 3 Encounters:   05/15/18 76.7 kg (169 lb)   04/17/18 76.7 kg (169 lb)   04/03/18 76.7 kg (169 lb)               Primary Care Provider Office Phone # Fax #    Jose Sood [phone] [address] [facility] [address]        Equal Access to Services     [facility] EUGENE : Hadii aad ku hadasho Soomaali, waaxda luqadaha, qaybta kaalmada adeegyada, waxay idiin hayaan adeeg kharash la'aan ah. So [facility] [phone].    ATENCIÓN: Si habla español, tiene a leblanc disposición servicios gratuitos de asistencia lingüística. Elisabethame al [phone].    We comply with applicable federal civil rights laws and [place] laws. We do not discriminate on the basis of race, color, national origin, age, disability, sex, sexual orientation, or gender identity.            Thank you!     Thank you for choosing [facility]  for your care. Our goal is always to provide you with excellent care. Hearing back from our patients is one way we can continue to improve our services. Please take a few minutes to complete the written survey that you may receive in the mail after your visit with us. Thank you!             Your Updated Medication List - Protect others around you: Learn how to safely use, store and throw away your medicines at www.disposemymeds.org.          This list is accurate as of 5/15/18  7:57 AM.  Always use your most recent med list.                   Brand Name Dispense Instructions for use Diagnosis    ACCU-CHEK ROBSON test strip   Generic drug:  blood glucose monitoring     100 strip    Use to test blood sugars 6-7 times daily.    Type 1 diabetes, HbA1c goal < 7% (H)       * ACE/ARB/ARNI NOT PRESCRIBED (INTENTIONAL)      by Other route continuous prn.    Type 1 diabetes, HbA1c goal < 7% (H)       * ASPIRIN NOT PRESCRIBED    INTENTIONAL     by Other route continuous prn Reported on 3/2/2017        BASAGLAR 100 UNIT/ML injection     15 mL    Inject 45 Units Subcutaneous  daily    Type 1 diabetes mellitus with peripheral autonomic neuropathy (H)       IBUPROFEN PO      Take 600 mg by mouth        insulin aspart 100 UNIT/ML injection    NovoLOG FLEXPEN    15 mL    INJECT SUBCUTANEOUSLY 3 TIMES DAILY (BEFORE MEALS) USE ACCORDING TO SLIDING SCALE.    Type 1 diabetes mellitus with peripheral autonomic neuropathy (H)       insulin pen needle 31G X 5 MM     100 each    1 Box See Admin Instructions Use 4 time(s) a day.    Type 1 diabetes, HbA1c goal < 7% (H), Routine general medical examination at a health care facility, Other specified idiopathic peripheral neuropathy       methocarbamol 750 MG tablet    ROBAXIN    30 tablet    Take 1 tablet (750 mg) by mouth every 6 hours as needed for muscle spasms (muscle spasm)    Post-op pain       * STATIN NOT PRESCRIBED (INTENTIONAL)     0 each    Reported on 3/2/2017        * Notice:  This list has 3 medication(s) that are the same as other medications prescribed for you. Read the directions carefully, and ask your doctor or other care provider to review them with you.

## 2018-05-15 NOTE — PROGRESS NOTES
"HISTORY OF PRESENT ILLNESS:    Rafat Baird is a 28 year old male who is seen in follow up for   Chief Complaint   Patient presents with     RECHECK     OPEN REDUCTION INTERNAL FIXATION LEFT DISTAL TIBIA AND FIBULA.  DOS: 2/28/18.  (10 weeks postop)     Surgical Followup     PREOPERATIVE DIAGNOSIS:  Acute fracture, left distal tibia and fibula. POSTOPERATIVE DIAGNOSIS:  Acute fracture, left distal tibia and fibula. PROCEDURE:  Closed reduction with percutaneous screw fixation of tibia fracture followed by open reduction with plate and screw fixation of the fibula fracture.   Interval: Patient reports he has been completely off of cigarettes by 1 week.  Patient states he has also returned to work in a modified fashion.  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  Patient evaluation done with Dr. Starkey    Physical Exam:  Vitals: /60 (BP Location: Right arm, Patient Position: Sitting, Cuff Size: Adult Large)  Temp 97.5  F (36.4  C) (Temporal)  Ht 1.803 m (5' 11\")  Wt 76.7 kg (169 lb)  BMI 23.57 kg/m2  BMI= Body mass index is 23.57 kg/(m^2).  Constitutional: healthy, alert and no acute distress   Psychiatric: mentation appears normal and affect normal/bright  NEURO: no focal deficits  Location of surgery: Left ankle  Range of motion: Patient is able to plantarflex but limited in dorsiflexion.  Patient states it has been limited in dorsiflexion since an accident 2 years ago.  He does have a very subtle motion in dorsiflexion.  Swelling is minimal.  Color of the foot is appropriate.  Incisions are healed.  Patient is able to place weight on the left foot without much discomfort.  He has mild awkward gait    IMAGING INTERPRETATION: X-ray images reveal good amount of callus.  There is some shifting of the fracture alignment in the tibia though it does appear to be healing well and stable.  The distal fibular plate does appear shifted away from the malleolus and bone.  Independent visualization of the images " was performed.     ASSESSMENT:    Chief Complaint   Patient presents with     RECHECK     OPEN REDUCTION INTERNAL FIXATION LEFT DISTAL TIBIA AND FIBULA.  DOS: 2/28/18.  (10 weeks postop)     Surgical Followup     PREOPERATIVE DIAGNOSIS:  Acute fracture, left distal tibia and fibula. POSTOPERATIVE DIAGNOSIS:  Acute fracture, left distal tibia and fibula. PROCEDURE:  Closed reduction with percutaneous screw fixation of tibia fracture followed by open reduction with plate and screw fixation of the fibula fracture.       ICD-10-CM    1. Closed fracture of left tibia and fibula with routine healing, subsequent encounter S82.202D XR Ankle Left G/E 3 Views    S82.402D      Patient is 10 weeks status post open reduction internal fixation of the left distal tibia and fibula.  Patient does have a shifting of the tibia and the plate on the fibula does appear away from the bone at the distal end.    Plan:   Patient to continue mobilizing his foot as he can when out of the boot.  He should continue to utilize the boot over the next couple weeks but at home he can go without.  A note was written that patient may return to work with a maximum of 2-3 hours up on feet and no lifting or carrying greater than 20 pounds.  Continue elevation of affected extremity as needed  Return to clinic 3 weeks with repeat x-rays    BP Readings from Last 1 Encounters:   05/15/18 124/60     BP noted to be well controlled today in office.     Maame Suarez PA-C   5/15/2018  10:00 AM      I attest I have seen and evaluated the patient.  I agree with above impression and plan.    Ramiro Starkey MD

## 2018-05-15 NOTE — LETTER
"    5/15/2018         RE: Rafat Baird  [id number]    [facility] [address]        Dear Colleague,    Thank you for referring your patient, Rafat Baird, to the [facility]. Please see a copy of my visit note below.    HISTORY OF PRESENT ILLNESS:    Rafat Baird is a 28 year old male who is seen in follow up for   Chief Complaint   Patient presents with     RECHECK     OPEN REDUCTION INTERNAL FIXATION LEFT DISTAL TIBIA AND FIBULA.  DOS: 2/28/18.  (10 weeks postop)     Surgical Followup     PREOPERATIVE DIAGNOSIS:  Acute fracture, left distal tibia and fibula. POSTOPERATIVE DIAGNOSIS:  Acute fracture, left distal tibia and fibula. PROCEDURE:  Closed reduction with percutaneous screw fixation of tibia fracture followed by open reduction with plate and screw fixation of the fibula fracture.   Interval: Patient reports he has been completely off of cigarettes by 1 week.  Patient states he has also returned to work in a modified fashion.  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  Patient evaluation done with Dr. Starkey    Physical Exam:  Vitals: /60 (BP Location: Right arm, Patient Position: Sitting, Cuff Size: Adult Large)  Temp 97.5  F (36.4  C) (Temporal)  Ht 1.803 m (5' 11\")  Wt 76.7 kg (169 lb)  BMI 23.57 kg/m2  BMI= Body mass index is 23.57 kg/(m^2).  Constitutional: healthy, alert and no acute distress   Psychiatric: mentation appears normal and affect normal/bright  NEURO: no focal deficits  Location of surgery: Left ankle  Range of motion: Patient is able to plantarflex but limited in dorsiflexion.  Patient states it has been limited in dorsiflexion since an accident 2 years ago.  He does have a very subtle motion in dorsiflexion.  Swelling is minimal.  Color of the foot is appropriate.  Incisions are healed.  Patient is able to place weight on the left foot without much discomfort.  He has mild awkward gait    IMAGING INTERPRETATION: X-ray images reveal good amount of callus.  " There is some shifting of the fracture alignment in the tibia though it does appear to be healing well and stable.  The distal fibular plate does appear shifted away from the malleolus and bone.  Independent visualization of the images was performed.     ASSESSMENT:    Chief Complaint   Patient presents with     RECHECK     OPEN REDUCTION INTERNAL FIXATION LEFT DISTAL TIBIA AND FIBULA.  DOS: 2/28/18.  (10 weeks postop)     Surgical Followup     PREOPERATIVE DIAGNOSIS:  Acute fracture, left distal tibia and fibula. POSTOPERATIVE DIAGNOSIS:  Acute fracture, left distal tibia and fibula. PROCEDURE:  Closed reduction with percutaneous screw fixation of tibia fracture followed by open reduction with plate and screw fixation of the fibula fracture.       ICD-10-CM    1. Closed fracture of left tibia and fibula with routine healing, subsequent encounter S82.202D XR Ankle Left G/E 3 Views    S82.402D      Patient is 10 weeks status post open reduction internal fixation of the left distal tibia and fibula.  Patient does have a shifting of the tibia and the plate on the fibula does appear away from the bone at the distal end.    Plan:   Patient to continue mobilizing his foot as he can when out of the boot.  He should continue to utilize the boot over the next couple weeks but at home he can go without.  A note was written that patient may return to work with a maximum of 2-3 hours up on feet and no lifting or carrying greater than 20 pounds.  Continue elevation of affected extremity as needed  Return to clinic 3 weeks with repeat x-rays    BP Readings from Last 1 Encounters:   05/15/18 124/60     BP noted to be well controlled today in office.     Maame Suarez PA-C   5/15/2018  10:00 AM      I attest I have seen and evaluated the patient.  I agree with above impression and plan.    Ramiro Starkey MD    Again, thank you for allowing me to participate in the care of your patient.        Sincerely,        Ramiro Starkey,  MD

## 2018-05-17 ENCOUNTER — TELEPHONE (OUTPATIENT)
Dept: FAMILY MEDICINE | Facility: CLINIC | Age: 28
End: 2018-05-17

## 2018-05-17 DIAGNOSIS — E10.43 TYPE 1 DIABETES MELLITUS WITH PERIPHERAL AUTONOMIC NEUROPATHY (H): ICD-10-CM

## 2018-05-17 NOTE — TELEPHONE ENCOUNTER
We received a fax from [facility] asking for the max dose for his sliding scale so that they can bill his insurance. We do not have that information because patient failed to keep his appointment with Jm Gilbert. Patient needs to make an appointment with him to have his sliding scale set up.     Called and left a detailed message per consent to communicate. He needs to see Jm Gilbert.

## 2018-05-17 NOTE — TELEPHONE ENCOUNTER
Patient called asking why he had to see Jm Gilbert. Explained it was to establish a siding scale and for diabetic management purposes. Rafat became irritated and is declining to see Jm Gilbert. He will not be seeing Jm anytime in the future. Per patient his max sliding scale it 15 units a day.

## 2018-06-05 ENCOUNTER — RADIANT APPOINTMENT (OUTPATIENT)
Dept: GENERAL RADIOLOGY | Facility: CLINIC | Age: 28
End: 2018-06-05
Attending: ORTHOPAEDIC SURGERY
Payer: COMMERCIAL

## 2018-06-05 ENCOUNTER — OFFICE VISIT (OUTPATIENT)
Dept: ORTHOPEDICS | Facility: CLINIC | Age: 28
End: 2018-06-05
Payer: COMMERCIAL

## 2018-06-05 VITALS
SYSTOLIC BLOOD PRESSURE: 124 MMHG | HEART RATE: 90 BPM | TEMPERATURE: 98.7 F | HEIGHT: 71 IN | DIASTOLIC BLOOD PRESSURE: 79 MMHG | WEIGHT: 170 LBS | BODY MASS INDEX: 23.8 KG/M2

## 2018-06-05 DIAGNOSIS — S82.302D CLOSED FRACTURE OF DISTAL END OF LEFT FIBULA AND TIBIA WITH ROUTINE HEALING, SUBSEQUENT ENCOUNTER: Primary | ICD-10-CM

## 2018-06-05 DIAGNOSIS — S92.902D: ICD-10-CM

## 2018-06-05 DIAGNOSIS — E11.40: ICD-10-CM

## 2018-06-05 DIAGNOSIS — S82.832D CLOSED FRACTURE OF DISTAL END OF LEFT FIBULA AND TIBIA WITH ROUTINE HEALING, SUBSEQUENT ENCOUNTER: Primary | ICD-10-CM

## 2018-06-05 PROCEDURE — 99213 OFFICE O/P EST LOW 20 MIN: CPT | Performed by: ORTHOPAEDIC SURGERY

## 2018-06-05 PROCEDURE — 73610 X-RAY EXAM OF ANKLE: CPT | Mod: TC

## 2018-06-05 ASSESSMENT — PAIN SCALES - GENERAL: PAINLEVEL: NO PAIN (0)

## 2018-06-05 NOTE — PROGRESS NOTES
"HISTORY OF PRESENT ILLNESS:    Rafat Baird is a 28 year old male who is seen in follow up for   Chief Complaint   Patient presents with     RECHECK     OPEN REDUCTION INTERNAL FIXATION LEFT DISTAL TIBIA AND FIBULA.  DOS: 2/28/18.  (4 months postop)     Surgical Followup     PREOPERATIVE DIAGNOSIS:  Acute fracture, left distal tibia and fibula. POSTOPERATIVE DIAGNOSIS:  Acute fracture, left distal tibia and fibula. PROCEDURE:  Closed reduction with percutaneous screw fixation of tibia fracture followed by open reduction with plate and screw fixation of the fibula fracture.   Present symptoms: Patient has returned to work on a full-time basis.  He has been utilizing the boot when up walking.  Patient has been free from smoking for the past 3 weeks.  Treatments tried to this point: Open reduction internal fixation, supportive boot  Patient evaluation done with Dr. Starkey    Physical Exam:  Vitals: /79 (BP Location: Right arm, Patient Position: Chair, Cuff Size: Adult Large)  Pulse 90  Temp 98.7  F (37.1  C) (Temporal)  Ht 1.803 m (5' 11\")  Wt 77.1 kg (170 lb)  BMI 23.71 kg/m2  BMI= Body mass index is 23.71 kg/(m^2).  Constitutional: healthy, alert and no acute distress   Psychiatric: mentation appears normal and affect normal/bright  NEURO: no focal deficits  SKIN: no excoriation or erythema. No signs of infection.  JOINT/EXTREMITIES:  Affected extremity pulses are easily palpable.  Left ankle Exam:   Inspection:Swelling: Mild swelling is still present  Range of Motion: Patient is able to plantarflex readily.  Dorsiflexion he is able to do slightly.  This lack of motion was present prior to surgery.  GAIT: Patient able to walk without much antalgia even without the boot    IMAGING INTERPRETATION: X-ray images reveal that the shifting of the tibia has remained stable over the past several weeks.  There is continued callus and healing present.  Independent visualization of the images was performed.   "   ASSESSMENT:    Chief Complaint   Patient presents with     RECHECK     OPEN REDUCTION INTERNAL FIXATION LEFT DISTAL TIBIA AND FIBULA.  DOS: 2/28/18.  (4 months postop)     Surgical Followup     PREOPERATIVE DIAGNOSIS:  Acute fracture, left distal tibia and fibula. POSTOPERATIVE DIAGNOSIS:  Acute fracture, left distal tibia and fibula. PROCEDURE:  Closed reduction with percutaneous screw fixation of tibia fracture followed by open reduction with plate and screw fixation of the fibula fracture.       ICD-10-CM    1. Closed fracture of distal end of left fibula and tibia with routine healing, subsequent encounter S82.302D     S82.832D      Patient was stable left ankle fracture that has maintained previous shift and alignment.    Plan:   Patient is advised to can begin slowly weaning out of the boot.  Continue non-smoking status  Return to clinic 1 month with repeat x-rays    BP Readings from Last 1 Encounters:   06/05/18 124/79     BP noted to be well controlled today in office.     Scribed by  Maame Suarez PA-C   6/5/2018  11:58 AM      I attest I have seen and evaluated the patient.  I agree with above impression and plan.    Ramiro Starkey MD

## 2018-06-05 NOTE — LETTER
"    6/5/2018         RE: Rafat Baird  [address]   [facility] [address]        Dear Colleague,    Thank you for referring your patient, Rafat Baird, to the [facility]. Please see a copy of my visit note below.    HISTORY OF PRESENT ILLNESS:    Rafat Baird is a 28 year old male who is seen in follow up for   Chief Complaint   Patient presents with     RECHECK     OPEN REDUCTION INTERNAL FIXATION LEFT DISTAL TIBIA AND FIBULA.  DOS: 2/28/18.  (4 months postop)     Surgical Followup     PREOPERATIVE DIAGNOSIS:  Acute fracture, left distal tibia and fibula. POSTOPERATIVE DIAGNOSIS:  Acute fracture, left distal tibia and fibula. PROCEDURE:  Closed reduction with percutaneous screw fixation of tibia fracture followed by open reduction with plate and screw fixation of the fibula fracture.   Present symptoms: Patient has returned to work on a full-time basis.  He has been utilizing the boot when up walking.  Patient has been free from smoking for the past 3 weeks.  Treatments tried to this point: Open reduction internal fixation, supportive boot  Patient evaluation done with Dr. Starkey    Physical Exam:  Vitals: /79 (BP Location: Right arm, Patient Position: Chair, Cuff Size: Adult Large)  Pulse 90  Temp 98.7  F (37.1  C) (Temporal)  Ht 1.803 m (5' 11\")  Wt 77.1 kg (170 lb)  BMI 23.71 kg/m2  BMI= Body mass index is 23.71 kg/(m^2).  Constitutional: healthy, alert and no acute distress   Psychiatric: mentation appears normal and affect normal/bright  NEURO: no focal deficits  SKIN: no excoriation or erythema. No signs of infection.  JOINT/EXTREMITIES:  Affected extremity pulses are easily palpable.  Left ankle Exam:   Inspection:Swelling: Mild swelling is still present  Range of Motion: Patient is able to plantarflex readily.  Dorsiflexion he is able to do slightly.  This lack of motion was present prior to surgery.  GAIT: Patient able to walk without much antalgia even without the boot    IMAGING " INTERPRETATION: X-ray images reveal that the shifting of the tibia has remained stable over the past several weeks.  There is continued callus and healing present.  Independent visualization of the images was performed.     ASSESSMENT:    Chief Complaint   Patient presents with     RECHECK     OPEN REDUCTION INTERNAL FIXATION LEFT DISTAL TIBIA AND FIBULA.  DOS: 2/28/18.  (4 months postop)     Surgical Followup     PREOPERATIVE DIAGNOSIS:  Acute fracture, left distal tibia and fibula. POSTOPERATIVE DIAGNOSIS:  Acute fracture, left distal tibia and fibula. PROCEDURE:  Closed reduction with percutaneous screw fixation of tibia fracture followed by open reduction with plate and screw fixation of the fibula fracture.       ICD-10-CM    1. Closed fracture of distal end of left fibula and tibia with routine healing, subsequent encounter S82.302D     S82.832D      Patient was stable left ankle fracture that has maintained previous shift and alignment.    Plan:   Patient is advised to can begin slowly weaning out of the boot.  Continue non-smoking status  Return to clinic 1 month with repeat x-rays    BP Readings from Last 1 Encounters:   06/05/18 124/79     BP noted to be well controlled today in office.     Scribed by  Maame Suarez PA-C   6/5/2018  11:58 AM      I attest I have seen and evaluated the patient.  I agree with above impression and plan.    Ramiro Starkey MD    Again, thank you for allowing me to participate in the care of your patient.        Sincerely,        Ramiro Starkey MD

## 2018-06-28 ENCOUNTER — TELEPHONE (OUTPATIENT)
Dept: ORTHOPEDICS | Facility: CLINIC | Age: 28
End: 2018-06-28

## 2018-06-28 NOTE — TELEPHONE ENCOUNTER
Reason for Call:  Other appointment    Detailed comments: Patient has been experiencing more pain in his left ankle where he had surgery to have hardware placed in March. He says it's been more painful since Tuesday 06/26. He has an appointment with Dr. Starkey on 07/09 but is wondering if there is something he can do in the meantime     Phone Number Patient can be reached at: Home number on file [phone] (home)    Best Time: any    Can we leave a detailed message on this number? YES    Call taken on 6/28/2018 at 1:25 PM by Rosa Barroso

## 2018-06-28 NOTE — TELEPHONE ENCOUNTER
Pt saw MD 6/5/18. Had ORIF left ankle 3/18. He was to ween out of boot which he has done since last appt. He now has stabbing pain in his ankle, no more swelling. Has been resting it, icing elevating.  I instructed pt to return to wearing the boot, continue ice and elevating. Pt is aware that MD is out of office. If his pain gets too severe he will need to be seen by one of the other ortho surgeons. Pt will try the boot and will come in if pain is too bad. RADHA Barker

## 2018-07-01 DIAGNOSIS — E10.43 TYPE 1 DIABETES MELLITUS WITH PERIPHERAL AUTONOMIC NEUROPATHY (H): ICD-10-CM

## 2018-07-02 NOTE — TELEPHONE ENCOUNTER
"Requested Prescriptions   Pending Prescriptions Disp Refills     BASAGLAR 100 UNIT/ML injection [Pharmacy Med Name: BASAGLAR 100UNIT    INJ]  0     Sig: INJECT 45 UNITS SUBCUTANEOUSLY ONCE DAILY *DUE  FOR  OFFICE  VISIT    Long Acting Insulin Protocol Failed    7/1/2018  3:22 PM       Failed - LDL on file in past 12 months    Recent Labs   Lab Test  03/30/17   0925   LDL  92            Failed - Microalbumin on file in past 12 months    Recent Labs   Lab Test  03/30/17   0934   MICROL  6   UMALCR  27.31*            Failed - HgbA1C in past 3 or 6 months    If HgbA1C is 8 or greater, it needs to be on file within the past 3 months.  If less than 8, must be on file within the past 6 months.     Recent Labs   Lab Test  11/03/17   0849   A1C  8.5*            Passed - Blood pressure less than 140/90 in past 6 months    BP Readings from Last 3 Encounters:   06/05/18 124/79   05/15/18 124/60   04/17/18 102/60                Passed - Serum creatinine on file in past 12 months    Recent Labs   Lab Test  02/26/18   1608   CR  0.77            Passed - Patient is age 18 or older       Passed - Recent (6 mo) or future (30 days) visit within the authorizing provider's specialty    Patient had office visit in the last 6 months or has a visit in the next 30 days with authorizing provider or within the authorizing provider's specialty.  See \"Patient Info\" tab in inbasket, or \"Choose Columns\" in Meds & Orders section of the refill encounter.            Last Written Prescription Date:  5/16/18  Last Fill Quantity: 15,  # refills: 0   Last office visit: 11/3/2017 with prescribing provider:     Future Office Visit:   Next 5 appointments (look out 90 days)     Jul 09, 2018  2:20 PM CDT   Return Visit with Ramiro Starkey MD   [facility] ([facility])    [address] [place] [id number] [phone]                   "

## 2018-07-03 RX ORDER — INSULIN GLARGINE 100 [IU]/ML
INJECTION, SOLUTION SUBCUTANEOUS
Qty: 15 ML | Refills: 1 | Status: SHIPPED | OUTPATIENT
Start: 2018-07-03 | End: 2018-10-03

## 2018-07-03 NOTE — TELEPHONE ENCOUNTER
BASAGLAR Prescription approved per [facility] Refill Protocol....................RADHA Manriquez

## 2018-07-06 ENCOUNTER — TELEPHONE (OUTPATIENT)
Dept: FAMILY MEDICINE | Facility: CLINIC | Age: 28
End: 2018-07-06

## 2018-07-06 DIAGNOSIS — E10.43 TYPE 1 DIABETES MELLITUS WITH PERIPHERAL AUTONOMIC NEUROPATHY (H): ICD-10-CM

## 2018-07-06 NOTE — TELEPHONE ENCOUNTER
Reason for Call:  Medication or medication refill:    Do you use a [facility]?  Name of the pharmacy and phone number for the current request:  [facility] - [phone] (Fax [phone])    Name of the medication requested: Novolog     Other request: Pt states this was recently changed from 3 times a day to 4 times a day. Now he is out and the pharmacy is telling him it is too early to refill. Please call.     Can we leave a detailed message on this number? YES    Phone number patient can be reached at: Home number on file [phone] (home)    Best Time: any     Call taken on 7/6/2018 at 3:04 PM by Brittnee Rogers

## 2018-07-09 ENCOUNTER — RADIANT APPOINTMENT (OUTPATIENT)
Dept: GENERAL RADIOLOGY | Facility: CLINIC | Age: 28
End: 2018-07-09
Attending: ORTHOPAEDIC SURGERY
Payer: COMMERCIAL

## 2018-07-09 ENCOUNTER — OFFICE VISIT (OUTPATIENT)
Dept: ORTHOPEDICS | Facility: CLINIC | Age: 28
End: 2018-07-09
Payer: COMMERCIAL

## 2018-07-09 VITALS
BODY MASS INDEX: 23.94 KG/M2 | SYSTOLIC BLOOD PRESSURE: 123 MMHG | HEIGHT: 71 IN | HEART RATE: 99 BPM | DIASTOLIC BLOOD PRESSURE: 74 MMHG | WEIGHT: 171 LBS

## 2018-07-09 DIAGNOSIS — S82.832D CLOSED FRACTURE OF DISTAL END OF LEFT FIBULA AND TIBIA WITH ROUTINE HEALING, SUBSEQUENT ENCOUNTER: Primary | ICD-10-CM

## 2018-07-09 DIAGNOSIS — S82.302D CLOSED FRACTURE OF DISTAL END OF LEFT FIBULA AND TIBIA WITH ROUTINE HEALING, SUBSEQUENT ENCOUNTER: ICD-10-CM

## 2018-07-09 DIAGNOSIS — S82.832D CLOSED FRACTURE OF DISTAL END OF LEFT FIBULA AND TIBIA WITH ROUTINE HEALING, SUBSEQUENT ENCOUNTER: ICD-10-CM

## 2018-07-09 DIAGNOSIS — S82.302D CLOSED FRACTURE OF DISTAL END OF LEFT FIBULA AND TIBIA WITH ROUTINE HEALING, SUBSEQUENT ENCOUNTER: Primary | ICD-10-CM

## 2018-07-09 PROCEDURE — 73610 X-RAY EXAM OF ANKLE: CPT | Mod: TC

## 2018-07-09 PROCEDURE — 99213 OFFICE O/P EST LOW 20 MIN: CPT | Performed by: ORTHOPAEDIC SURGERY

## 2018-07-09 ASSESSMENT — PAIN SCALES - GENERAL: PAINLEVEL: EXTREME PAIN (8)

## 2018-07-09 NOTE — MR AVS SNAPSHOT
"              After Visit Summary   7/9/2018    Rafat Baird    MRN: [id number]           Patient Information     Date Of Birth          1990        Visit Information        Provider Department      7/9/2018 2:20 PM Ramiro Starkey MD [facility]        Today's Diagnoses     Closed fracture of distal end of left fibula and tibia with routine healing, subsequent encounter    -  1       Follow-ups after your visit        Your next 10 appointments already scheduled     Jul 09, 2018  2:20 PM CDT   Return Visit with Ramiro Starkey MD   [facility] ([facility])    [address] [place] [id number] [phone]              Who to contact     If you have questions or need follow up information about today's clinic visit or your schedule please contact [facility] directly at [phone].  Normal or non-critical lab and imaging results will be communicated to you by MyChart, letter or phone within 4 business days after the clinic has received the results. If you do not hear from us within 7 days, please contact the clinic through MyChart or phone. If you have a critical or abnormal lab result, we will notify you by phone as soon as possible.  Submit refill requests through [employer] or call your pharmacy and they will forward the refill request to us. Please allow 3 business days for your refill to be completed.          Additional Information About Your Visit        Care EveryWhere ID     This is your Care EveryWhere ID. This could be used by other organizations to access your [facility] medical records  [id number]        Your Vitals Were     Pulse Height BMI (Body Mass Index)             99 1.803 m (5' 11\") 23.85 kg/m2          Blood Pressure from Last 3 Encounters:   07/09/18 123/74   06/05/18 124/79   05/15/18 124/60    Weight from Last 3 Encounters:   07/09/18 77.6 kg (171 lb)   06/05/18 77.1 kg (170 lb)   05/15/18 76.7 kg (169 lb) "               Primary Care Provider Office Phone # Fax #    Jose Sood [phone]       [address] [facility] [id number]        Equal Access to Services     MORALES KNIGHT : Aleida miri verdugo farooq Mcbride, wanileshda luqadaha, qaybta kaalmada ar, americo chambers laRenocase lacy. So [facility] [phone].    ATENCIÓN: Si habla español, tiene a leblanc disposición servicios gratuitos de asistencia lingüística. Llame al [phone].    We comply with applicable federal civil rights laws and [place] laws. We do not discriminate on the basis of race, color, national origin, age, disability, sex, sexual orientation, or gender identity.            Thank you!     Thank you for choosing [facility]  for your care. Our goal is always to provide you with excellent care. Hearing back from our patients is one way we can continue to improve our services. Please take a few minutes to complete the written survey that you may receive in the mail after your visit with us. Thank you!             Your Updated Medication List - Protect others around you: Learn how to safely use, store and throw away your medicines at www.disposemymeds.org.          This list is accurate as of 7/9/18  2:16 PM.  Always use your most recent med list.                   Brand Name Dispense Instructions for use Diagnosis    ACCU-CHEK ROBSON test strip   Generic drug:  blood glucose monitoring     100 strip    Use to test blood sugars 6-7 times daily.    Type 1 diabetes, HbA1c goal < 7% (H)       * ACE/ARB/ARNI NOT PRESCRIBED (INTENTIONAL)      by Other route continuous prn.    Type 1 diabetes, HbA1c goal < 7% (H)       * ASPIRIN NOT PRESCRIBED    INTENTIONAL     by Other route continuous prn Reported on 3/2/2017        BASAGLAR 100 UNIT/ML injection     15 mL    INJECT 45 UNITS SUBCUTANEOUSLY ONCE DAILY *DUE  FOR  OFFICE  VISIT    Type 1 diabetes mellitus with peripheral autonomic neuropathy (H)        IBUPROFEN PO      Take 600 mg by mouth        insulin aspart 100 UNIT/ML injection    NovoLOG FLEXPEN    30 mL    INJECT SUBCUTANEOUSLY four TIMES DAILY BEFORE MEALS ACCORDING TO SLIDING SCALE    Type 1 diabetes mellitus with peripheral autonomic neuropathy (H)       insulin pen needle 31G X 5 MM     100 each    1 Box See Admin Instructions Use 4 time(s) a day.    Type 1 diabetes, HbA1c goal < 7% (H), Routine general medical examination at a health care facility, Other specified idiopathic peripheral neuropathy       methocarbamol 750 MG tablet    ROBAXIN    30 tablet    Take 1 tablet (750 mg) by mouth every 6 hours as needed for muscle spasms (muscle spasm)    Post-op pain       * STATIN NOT PRESCRIBED (INTENTIONAL)     0 each    Reported on 3/2/2017        * Notice:  This list has 3 medication(s) that are the same as other medications prescribed for you. Read the directions carefully, and ask your doctor or other care provider to review them with you.

## 2018-07-09 NOTE — PROGRESS NOTES
"HISTORY OF PRESENT ILLNESS:    Rafat Baird is a 28 year old male who is seen in follow up for   Chief Complaint   Patient presents with     RECHECK     OPEN REDUCTION INTERNAL FIXATION LEFT DISTAL TIBIA AND FIBULA.  DOS: 2/28/18.  (4 months postop)     Neurologic Problem     PREOPERATIVE DIAGNOSIS:  Acute fracture, left distal tibia and fibula. POSTOPERATIVE DIAGNOSIS:  Acute fracture, left distal tibia and fibula. PROCEDURE:  Closed reduction with percutaneous screw fixation of tibia fracture followed by open reduction with plate and screw fixation of the fibula fracture.   Interval: Patient reports last 3-4 days his left ankle appears more swollen.  He reports no significant pain.  Patient states he did try to get a refill on his Robaxin recently but was told that there was a shortage.  He states he is utilizing this primarily for the nerve pain.  Patient evaluation done with Dr. Starkey    Physical Exam:  Vitals: /74 (BP Location: Right arm, Patient Position: Chair, Cuff Size: Adult Regular)  Pulse 99  Ht 1.803 m (5' 11\")  Wt 77.6 kg (171 lb)  BMI 23.85 kg/m2  BMI= Body mass index is 23.85 kg/(m^2).  Constitutional: healthy, alert and no acute distress   Psychiatric: mentation appears normal and affect normal/bright  NEURO: no focal deficits  Location of surgery: Left ankle  Patient's left ankle is 2 times more swollen than the appearance of the right ankle.  Incision sites: Healed  Range of motion: Good range of motion  Gait is minimally antalgic    IMAGING INTERPRETATION: X-ray images continue to reveal increased calcification and lay down of bone.  Independent visualization of the images was performed.     ASSESSMENT:    Chief Complaint   Patient presents with     RECHECK     OPEN REDUCTION INTERNAL FIXATION LEFT DISTAL TIBIA AND FIBULA.  DOS: 2/28/18.  (4 months postop)     Neurologic Problem     PREOPERATIVE DIAGNOSIS:  Acute fracture, left distal tibia and fibula. POSTOPERATIVE DIAGNOSIS:  Acute " fracture, left distal tibia and fibula. PROCEDURE:  Closed reduction with percutaneous screw fixation of tibia fracture followed by open reduction with plate and screw fixation of the fibula fracture.       ICD-10-CM    1. Closed fracture of distal end of left fibula and tibia with routine healing, subsequent encounter S82.302D XR Ankle Left G/E 3 Views    S82.832D      Patient is 4 months status post left distal tib-fib fracture.  Patient continues to develop bone since last visit.  Patient reports no considerable pain.    Plan:   Patient is advised that this far out from surgery he likely does not need the Robaxin that he was seeking from the pharmacy.  Patient is advised that if he is feeling pain down the ankle can utilize analgesic balm to desensitize any nerve pain he may be having.  Patient states he did try gabapentin in the past but is uncertain how it affected him.  He is willing to try the analgesic balm first.  Opinion elevation of affected extremity as needed for swelling  Patient has returned to work in full capacity.  He does need a letter today as he needed to leave work early for this appointment.  No changes in patient's current plan for weightbearing etc.  Patient will return to clinic in 2 months with repeat x-rays.  If patient has considerable increase in calcifications may need to consider CT scan for occult fracture    BP Readings from Last 1 Encounters:   07/09/18 123/74       BP noted to be well controlled today in office.     Maame Suarez PA-C   7/9/2018  2:36 PM      I attest I have seen and evaluated the patient.  I agree with above impression and plan.    Ramiro Starkey MD

## 2018-07-09 NOTE — LETTER
[address] [address]  [phone]          July 9, 2018    RE:  Rafat Baird                                                                                                                                                       [id number]    [facility] [address]            To whom it may concern:    Rafat Baird is under my professional care for follow up of left ankle fracture.  Please excuse patient for time missed today.     Sincerely,        Ramiro Starkey MD

## 2018-07-09 NOTE — LETTER
"    7/9/2018         RE: Rafat Baird  [address]   [facility] [address]        Dear Colleague,    Thank you for referring your patient, Rafat Baird, to the [facility]. Please see a copy of my visit note below.    HISTORY OF PRESENT ILLNESS:    Rafat Baird is a 28 year old male who is seen in follow up for   Chief Complaint   Patient presents with     RECHECK     OPEN REDUCTION INTERNAL FIXATION LEFT DISTAL TIBIA AND FIBULA.  DOS: 2/28/18.  (4 months postop)     Neurologic Problem     PREOPERATIVE DIAGNOSIS:  Acute fracture, left distal tibia and fibula. POSTOPERATIVE DIAGNOSIS:  Acute fracture, left distal tibia and fibula. PROCEDURE:  Closed reduction with percutaneous screw fixation of tibia fracture followed by open reduction with plate and screw fixation of the fibula fracture.   Interval: Patient reports last 3-4 days his left ankle appears more swollen.  He reports no significant pain.  Patient states he did try to get a refill on his Robaxin recently but was told that there was a shortage.  He states he is utilizing this primarily for the nerve pain.  Patient evaluation done with Dr. Starkey    Physical Exam:  Vitals: /74 (BP Location: Right arm, Patient Position: Chair, Cuff Size: Adult Regular)  Pulse 99  Ht 1.803 m (5' 11\")  Wt 77.6 kg (171 lb)  BMI 23.85 kg/m2  BMI= Body mass index is 23.85 kg/(m^2).  Constitutional: healthy, alert and no acute distress   Psychiatric: mentation appears normal and affect normal/bright  NEURO: no focal deficits  Location of surgery: Left ankle  Patient's left ankle is 2 times more swollen than the appearance of the right ankle.  Incision sites: Healed  Range of motion: Good range of motion  Gait is minimally antalgic    IMAGING INTERPRETATION: X-ray images continue to reveal increased calcification and lay down of bone.  Independent visualization of the images was performed.     ASSESSMENT:    Chief Complaint   Patient presents with     RECHECK "     OPEN REDUCTION INTERNAL FIXATION LEFT DISTAL TIBIA AND FIBULA.  DOS: 2/28/18.  (4 months postop)     Neurologic Problem     PREOPERATIVE DIAGNOSIS:  Acute fracture, left distal tibia and fibula. POSTOPERATIVE DIAGNOSIS:  Acute fracture, left distal tibia and fibula. PROCEDURE:  Closed reduction with percutaneous screw fixation of tibia fracture followed by open reduction with plate and screw fixation of the fibula fracture.       ICD-10-CM    1. Closed fracture of distal end of left fibula and tibia with routine healing, subsequent encounter S82.302D XR Ankle Left G/E 3 Views    S82.832D      Patient is 4 months status post left distal tib-fib fracture.  Patient continues to develop bone since last visit.  Patient reports no considerable pain.    Plan:   Patient is advised that this far out from surgery he likely does not need the Robaxin that he was seeking from the pharmacy.  Patient is advised that if he is feeling pain down the ankle can utilize analgesic balm to desensitize any nerve pain he may be having.  Patient states he did try gabapentin in the past but is uncertain how it affected him.  He is willing to try the analgesic balm first.  Opinion elevation of affected extremity as needed for swelling  Patient has returned to work in full capacity.  He does need a letter today as he needed to leave work early for this appointment.  No changes in patient's current plan for weightbearing etc.  Patient will return to clinic in 2 months with repeat x-rays.  If patient has considerable increase in calcifications may need to consider CT scan for occult fracture    BP Readings from Last 1 Encounters:   07/09/18 123/74       BP noted to be well controlled today in office.     Maame Suarez PA-C   7/9/2018  2:36 PM      I attest I have seen and evaluated the patient.  I agree with above impression and plan.    Ramiro Starkey MD    Again, thank you for allowing me to participate in the care of your patient.         Sincerely,        Ramiro Starkey MD

## 2018-07-12 NOTE — TELEPHONE ENCOUNTER
Spoke to Dr Sood we will remove the extra on directions so directions need to   Inject ?/units subcutaneously four times a day before meals we need to know how many units patient uses???    Left message for patient to return call.  Janessa Hodges MA 7/12/2018

## 2018-07-12 NOTE — TELEPHONE ENCOUNTER
Patient called and stated that he is at the [facility] pharmacy in [place] trying to get his insulin and can't. Patient is out of his medication and needs to speak with Dr. Sood or someone from his team tonight. Please call the patient at  [phone]      Thank you,  Maddie Bueno   for [facility]

## 2018-07-13 DIAGNOSIS — E10.43 TYPE 1 DIABETES MELLITUS WITH PERIPHERAL AUTONOMIC NEUROPATHY (H): ICD-10-CM

## 2018-07-13 NOTE — TELEPHONE ENCOUNTER
"Received fax request for novolog flexpen 100Unit/mL insulin stating that \"patient uses 15 units four times per day\".    Rosa Bella, [facility]  July 13, 2018    "

## 2018-07-13 NOTE — TELEPHONE ENCOUNTER
"Received fax request for novolog flexpen 100Unit/mL insulin stating that \"patient uses 15 units four times per day\".     Rosa Bella, [facility]  July 13, 2018  "

## 2018-07-13 NOTE — TELEPHONE ENCOUNTER
Attempted to contact patient, no answer and did not go to voicemail.  If patient calls back please find out how many units patient uses?    Rosa Bella CMA  July 13, 2018

## 2018-09-10 ENCOUNTER — OFFICE VISIT (OUTPATIENT)
Dept: ORTHOPEDICS | Facility: CLINIC | Age: 28
End: 2018-09-10
Payer: COMMERCIAL

## 2018-09-10 ENCOUNTER — RADIANT APPOINTMENT (OUTPATIENT)
Dept: GENERAL RADIOLOGY | Facility: CLINIC | Age: 28
End: 2018-09-10
Attending: ORTHOPAEDIC SURGERY
Payer: COMMERCIAL

## 2018-09-10 VITALS
BODY MASS INDEX: 23.94 KG/M2 | WEIGHT: 171 LBS | DIASTOLIC BLOOD PRESSURE: 76 MMHG | HEART RATE: 84 BPM | SYSTOLIC BLOOD PRESSURE: 116 MMHG | HEIGHT: 71 IN

## 2018-09-10 DIAGNOSIS — S82.302D CLOSED FRACTURE OF DISTAL END OF LEFT FIBULA AND TIBIA WITH ROUTINE HEALING, SUBSEQUENT ENCOUNTER: ICD-10-CM

## 2018-09-10 DIAGNOSIS — S82.832D CLOSED FRACTURE OF DISTAL END OF LEFT FIBULA AND TIBIA WITH ROUTINE HEALING, SUBSEQUENT ENCOUNTER: Primary | ICD-10-CM

## 2018-09-10 DIAGNOSIS — S82.302D CLOSED FRACTURE OF DISTAL END OF LEFT FIBULA AND TIBIA WITH ROUTINE HEALING, SUBSEQUENT ENCOUNTER: Primary | ICD-10-CM

## 2018-09-10 DIAGNOSIS — S82.832D CLOSED FRACTURE OF DISTAL END OF LEFT FIBULA AND TIBIA WITH ROUTINE HEALING, SUBSEQUENT ENCOUNTER: ICD-10-CM

## 2018-09-10 PROCEDURE — 99213 OFFICE O/P EST LOW 20 MIN: CPT | Performed by: ORTHOPAEDIC SURGERY

## 2018-09-10 PROCEDURE — 73610 X-RAY EXAM OF ANKLE: CPT | Mod: TC

## 2018-09-10 ASSESSMENT — PAIN SCALES - GENERAL: PAINLEVEL: NO PAIN (0)

## 2018-09-10 NOTE — MR AVS SNAPSHOT
"              After Visit Summary   9/10/2018    Rafat Baird    MRN: [id number]           Patient Information     Date Of Birth          1990        Visit Information        Provider Department      9/10/2018 1:00 PM Ramiro Starkey MD [facility]        Today's Diagnoses     Closed fracture of distal end of left fibula and tibia with routine healing, subsequent encounter    -  1       Follow-ups after your visit        Your next 10 appointments already scheduled     Sep 10, 2018  1:00 PM CDT   Return Visit with Ramiro Starkey MD   [facility]    [address] [place] [address] [id number] [phone]              Who to contact     If you have questions or need follow up information about today's clinic visit or your schedule please contact [facility] directly at [phone].  Normal or non-critical lab and imaging results will be communicated to you by MyChart, letter or phone within 4 business days after the clinic has received the results. If you do not hear from us within 7 days, please contact the clinic through MyChart or phone. If you have a critical or abnormal lab result, we will notify you by phone as soon as possible.  Submit refill requests through Kl[employer] or call your pharmacy and they will forward the refill request to us. Please allow 3 business days for your refill to be completed.          Additional Information About Your Visit        MyChart Information     Klash lets you send messages to your doctor, view your test results, renew your prescriptions, schedule appointments and more. To sign up, go to [email or web address] . Click on \"Log in\" on the left side of the screen, which will take you to the Welcome page. Then click on \"Sign up Now\" on the right side of the page.     You will be asked to enter the access code listed below, as well as some personal information. Please follow the directions to " "create your username and password.     Your access code is: [email or web address]  Expires: 2018 12:56 PM     Your access code will  in 90 days. If you need help or a new code, please call your [place] clinic or [phone].        Care EveryWhere ID     This is your Care EveryWhere ID. This could be used by other organizations to access your [place] medical records  [id number]        Your Vitals Were     Pulse Height BMI (Body Mass Index)             84 1.803 m (5' 11\") 23.85 kg/m2          Blood Pressure from Last 3 Encounters:   09/10/18 116/76   18 123/74   18 124/79    Weight from Last 3 Encounters:   09/10/18 77.6 kg (171 lb)   18 77.6 kg (171 lb)   18 77.1 kg (170 lb)               Primary Care Provider Office Phone # Fax #    Jose Sood [phone]       [address] [place] [phone]        Equal Access to Services     [facility] : Hadii miri ku hadasho Sothomasali, waaxda luqadaha, qaybta kaalmada ar, americo lacy. So [facility] [phone].    ATENCIÓN: Si habla español, tiene a leblanc disposición servicios gratuitos de asistencia lingüística. [facility] [phone].    We comply with applicable federal civil rights laws and [place] laws. We do not discriminate on the basis of race, color, national origin, age, disability, sex, sexual orientation, or gender identity.            Thank you!     Thank you for choosing [facility]  for your care. Our goal is always to provide you with excellent care. Hearing back from our patients is one way we can continue to improve our services. Please take a few minutes to complete the written survey that you may receive in the mail after your visit with us. Thank you!             Your Updated Medication List - Protect others around you: Learn how to safely use, store and throw away your medicines at www.disposemymeds.org.          This list is accurate as of " 9/10/18 12:56 PM.  Always use your most recent med list.                   Brand Name Dispense Instructions for use Diagnosis    ACCU-CHEK ROBSON test strip   Generic drug:  blood glucose monitoring     100 strip    Use to test blood sugars 6-7 times daily.    Type 1 diabetes, HbA1c goal < 7% (H)       * ACE/ARB/ARNI NOT PRESCRIBED (INTENTIONAL)      by Other route continuous prn.    Type 1 diabetes, HbA1c goal < 7% (H)       * ASPIRIN NOT PRESCRIBED    INTENTIONAL     by Other route continuous prn Reported on 3/2/2017        BASAGLAR 100 UNIT/ML injection     15 mL    INJECT 45 UNITS SUBCUTANEOUSLY ONCE DAILY *DUE  FOR  OFFICE  VISIT    Type 1 diabetes mellitus with peripheral autonomic neuropathy (H)       IBUPROFEN PO      Take 600 mg by mouth        insulin aspart 100 UNIT/ML injection    NovoLOG FLEXPEN    30 mL    INJECT SUBCUTANEOUSLY four TIMES DAILY BEFORE MEALS ACCORDING TO SLIDING SCALE    Type 1 diabetes mellitus with peripheral autonomic neuropathy (H)       insulin pen needle 31G X 5 MM     100 each    1 Box See Admin Instructions Use 4 time(s) a day.    Type 1 diabetes, HbA1c goal < 7% (H), Routine general medical examination at a health care facility, Other specified idiopathic peripheral neuropathy       methocarbamol 750 MG tablet    ROBAXIN    30 tablet    Take 1 tablet (750 mg) by mouth every 6 hours as needed for muscle spasms (muscle spasm)    Post-op pain       * STATIN NOT PRESCRIBED (INTENTIONAL)     0 each    Reported on 3/2/2017        * Notice:  This list has 3 medication(s) that are the same as other medications prescribed for you. Read the directions carefully, and ask your doctor or other care provider to review them with you.
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
## 2018-10-03 ENCOUNTER — TELEPHONE (OUTPATIENT)
Dept: FAMILY MEDICINE | Facility: CLINIC | Age: 28
End: 2018-10-03

## 2018-10-03 DIAGNOSIS — E10.43 TYPE 1 DIABETES MELLITUS WITH PERIPHERAL AUTONOMIC NEUROPATHY (H): ICD-10-CM

## 2018-10-03 RX ORDER — INSULIN GLARGINE 100 [IU]/ML
INJECTION, SOLUTION SUBCUTANEOUS
Qty: 15 ML | Refills: 0 | Status: SHIPPED | OUTPATIENT
Start: 2018-10-03 | End: 2019-11-22

## 2018-10-03 NOTE — LETTER
[address] [id number] [phone]        October 5, 2018    Rafat Baird  [id number]    [facility] [address]          Dear Rafat,    We, at [facility] want to help you receive better care.  To improve the process of getting refills please schedule an appointment with our medication therapy  Jm Gilbert before your next refill. You can call the clinic at [phone] to schedule an appointment.      Sincerely,        Jose Sood M.D.

## 2018-10-03 NOTE — TELEPHONE ENCOUNTER
"basaglar  Last Written Prescription Date:  07/03/2018  Last Fill Quantity: 15,  # refills: 1   Last office visit: 02/26/2018 with prescribing provider:      Future Office Visit:      Requested Prescriptions   Pending Prescriptions Disp Refills     BASAGLAR 100 UNIT/ML injection [Pharmacy Med Name: BASAGLAR 100UNIT    INJ]  1     Sig: INJECT 45 UNITS SUBCUTANEOUSLY ONCE DAILY DUE  FOR  OFFICE  VISIT    Long Acting Insulin Protocol Failed    10/3/2018  5:30 AM       Failed - LDL on file in past 12 months    Recent Labs   Lab Test  03/30/17   0925   LDL  92            Failed - Microalbumin on file in past 12 months    Recent Labs   Lab Test  03/30/17   0934   MICROL  6   UMALCR  27.31*            Failed - HgbA1C in past 3 or 6 months    If HgbA1C is 8 or greater, it needs to be on file within the past 3 months.  If less than 8, must be on file within the past 6 months.     Recent Labs   Lab Test  11/03/17   0849   A1C  8.5*            Failed - Recent (6 mo) or future (30 days) visit within the authorizing provider's specialty    Patient had office visit in the last 6 months or has a visit in the next 30 days with authorizing provider or within the authorizing provider's specialty.  See \"Patient Info\" tab in inbasket, or \"Choose Columns\" in Meds & Orders section of the refill encounter.           Passed - Blood pressure less than 140/90 in past 6 months    BP Readings from Last 3 Encounters:   09/10/18 116/76   07/09/18 123/74   06/05/18 124/79                Passed - Serum creatinine on file in past 12 months    Recent Labs   Lab Test  02/26/18   1608   CR  0.77            Passed - Patient is age 18 or older          Medication is being filled for 1 time refill only due to:  Patient needs to be seen because Due for diabetes f/u with Dr Sood.  Sending to scheduling for outreach.      Lisa Sood RN on 10/3/2018 at 4:03 PM    "

## 2018-10-05 NOTE — TELEPHONE ENCOUNTER
2nd attempt to reach pt- left another message. Routing back to team to send letter.   Thank you,  Brittnee Rogers- Pt Rep.

## 2018-11-29 ENCOUNTER — TELEPHONE (OUTPATIENT)
Dept: FAMILY MEDICINE | Facility: CLINIC | Age: 28
End: 2018-11-29

## 2018-11-29 NOTE — TELEPHONE ENCOUNTER
Panel Management Review      Patient has the following on his problem list:     Diabetes    ASA: Not Required     Last A1C  Lab Results   Component Value Date    A1C 8.5 11/03/2017    A1C 8.6 03/30/2017    A1C 8.4 10/12/2015    A1C 8.0 08/04/2014    A1C 7.4 11/20/2013     A1C tested: FAILED    Last LDL:    Lab Results   Component Value Date    CHOL 144 03/30/2017     Lab Results   Component Value Date    HDL 37 03/30/2017     Lab Results   Component Value Date    LDL 92 03/30/2017     Lab Results   Component Value Date    TRIG 77 03/30/2017     Lab Results   Component Value Date    CHOLHDLRATIO 3.7 10/12/2015     Lab Results   Component Value Date    NHDL 107 03/30/2017       Is the patient on a Statin? NO             Is the patient on Aspirin? NO    Medications     HMG CoA Reductase Inhibitors    STATIN NOT PRESCRIBED, INTENTIONAL,          Last three blood pressure readings:  BP Readings from Last 3 Encounters:   09/10/18 116/76   07/09/18 123/74   06/05/18 124/79       Date of last diabetes office visit: 11/3/17     Tobacco History:     History   Smoking Status     Current Every Day Smoker     Packs/day: 0.25     Years: 1.00   Smokeless Tobacco     Never Used           Composite cancer screening  Chart review shows that this patient is due/due soon for the following None  Summary:    Patient is due/failing the following:   A1C, LDL and PHQ9    Action needed:   Patient needs office visit for physical and labs.    Type of outreach:    Phone, left message for patient to call back.     Questions for provider review:    None                                                                                                                                    Krista Gillis MA     Chart routed to Care Team .

## 2018-12-04 ENCOUNTER — OFFICE VISIT (OUTPATIENT)
Dept: URGENT CARE | Facility: RETAIL CLINIC | Age: 28
End: 2018-12-04
Payer: COMMERCIAL

## 2018-12-04 VITALS
HEART RATE: 88 BPM | SYSTOLIC BLOOD PRESSURE: 134 MMHG | OXYGEN SATURATION: 99 % | DIASTOLIC BLOOD PRESSURE: 78 MMHG | TEMPERATURE: 99 F

## 2018-12-04 DIAGNOSIS — J01.90 ACUTE SINUSITIS WITH SYMPTOMS > 10 DAYS: Primary | ICD-10-CM

## 2018-12-04 PROCEDURE — 99213 OFFICE O/P EST LOW 20 MIN: CPT | Performed by: FAMILY MEDICINE

## 2018-12-04 RX ORDER — AZITHROMYCIN 250 MG/1
500 TABLET, FILM COATED ORAL DAILY
Qty: 10 TABLET | Refills: 0 | Status: SHIPPED | OUTPATIENT
Start: 2018-12-04 | End: 2019-02-12

## 2018-12-04 NOTE — MR AVS SNAPSHOT
"              After Visit Summary   2018    Rafat Baird    MRN: [id number]           Patient Information     Date Of Birth          1990        Visit Information        Provider Department      2018 3:30 PM Ricki Waldrop MD [facility]'s Diagnoses     Acute sinusitis with symptoms > 10 days    -  1       Follow-ups after your visit        Who to contact     You can reach your care team any time of the day by calling [phone].  Notification of test results:  If you have an abnormal lab result, we will notify you by phone as soon as possible.         Additional Information About Your Visit        MyChart Information     [employer] lets you send messages to your doctor, view your test results, renew your prescriptions, schedule appointments and more. To sign up, go to [email or web address] . Click on \"Log in\" on the left side of the screen, which will take you to the Welcome page. Then click on \"Sign up Now\" on the right side of the page.     You will be asked to enter the access code listed below, as well as some personal information. Please follow the directions to create your username and password.     Your access code is: [email or web address]  Expires: 2018 11:56 AM     Your access code will  in 90 days. If you need help or a new code, please call your [place] clinic or [phone].        Care EveryWhere ID     This is your [facility] EveryWhere ID. This could be used by other organizations to access your [facility] medical records  [id number]        Your Vitals Were     Pulse Temperature Pulse Oximetry             88 99  F (37.2  C) (Oral) 99%          Blood Pressure from Last 3 Encounters:   18 134/78   09/10/18 116/76   18 123/74    Weight from Last 3 Encounters:   09/10/18 171 lb (77.6 kg)   18 171 lb (77.6 kg)   18 170 lb (77.1 kg)              Today, you had the following     No orders found for display         Today's Medication " Changes          These changes are accurate as of 12/4/18  3:43 PM.  If you have any questions, ask your nurse or doctor.               Start taking these medicines.        Dose/Directions    azithromycin 250 MG tablet   Commonly known as:  ZITHROMAX   Used for:  Acute sinusitis with symptoms > 10 days   Started by:  Ricki Waldrop MD        Dose:  500 mg   Take 2 tablets (500 mg) by mouth daily for 5 days   Quantity:  10 tablet   Refills:  0            Where to get your medicines      These medications were sent to [address]  [address], [facility] [address]     Phone:  [phone]     azithromycin 250 MG tablet                Primary Care Provider Office Phone # Fax #    Jose Sood [phone]       [address] [facility] [address]        Equal Access to Services     [facility] Hospital[facility]NNY : Hadii miri verdugo hadasho Soomaali, waaxda luqadaha, qaybta kaalmada adeegyada, americo foreman . So [facility] [phone].    ATENCIÓN: Si habla español, tiene a leblanc disposición servicios gratuitos de asistencia lingüística. [facility] [phone].    We comply with applicable federal civil rights laws and [place] laws. We do not discriminate on the basis of race, color, national origin, age, disability, sex, sexual orientation, or gender identity.            Thank you!     Thank you for choosing [facility]  for your care. Our goal is always to provide you with excellent care. Hearing back from our patients is one way we can continue to improve our services. Please take a few minutes to complete the written survey that you may receive in the mail after your visit with us. Thank you!             Your Updated Medication List - Protect others around you: Learn how to safely use, store and throw away your medicines at www.disposemymeds.org.          This list is accurate as of 12/4/18  3:43 PM.  Always use your most  recent med list.                   Brand Name Dispense Instructions for use Diagnosis    ACCU-CHEK ROBSON test strip   Generic drug:  blood glucose monitoring     100 strip    Use to test blood sugars 6-7 times daily.    Type 1 diabetes, HbA1c goal < 7% (H)       * ACE/ARB/ARNI NOT PRESCRIBED    INTENTIONAL     by Other route continuous prn.    Type 1 diabetes, HbA1c goal < 7% (H)       * ASPIRIN NOT PRESCRIBED    INTENTIONAL     by Other route continuous prn Reported on 3/2/2017        azithromycin 250 MG tablet    ZITHROMAX    10 tablet    Take 2 tablets (500 mg) by mouth daily for 5 days    Acute sinusitis with symptoms > 10 days       IBUPROFEN PO      Take 600 mg by mouth        insulin aspart 100 UNIT/ML pen    NovoLOG FLEXPEN    30 mL    INJECT SUBCUTANEOUSLY four TIMES DAILY BEFORE MEALS ACCORDING TO SLIDING SCALE    Type 1 diabetes mellitus with peripheral autonomic neuropathy (H)       * insulin glargine 100 UNIT/ML pen     15 mL    INJECT 45 UNITS SUBCUTANEOUSLY ONCE DAILY DUE  FOR  OFFICE  VISIT    Type 1 diabetes mellitus with peripheral autonomic neuropathy (H)       * insulin glargine 100 UNIT/ML pen     15 mL    INJECT 45 UNITS SUBCUTANEOUSLY ONCE DAILY DUE  FOR  OFFICE  VISIT    Type 1 diabetes mellitus with peripheral autonomic neuropathy (H)       insulin pen needle 31G X 5 MM miscellaneous    31G X 5 MM    100 each    1 Box See Admin Instructions Use 4 time(s) a day.    Type 1 diabetes, HbA1c goal < 7% (H), Routine general medical examination at a health care facility, Other specified idiopathic peripheral neuropathy       methocarbamol 750 MG tablet    ROBAXIN    30 tablet    Take 1 tablet (750 mg) by mouth every 6 hours as needed for muscle spasms (muscle spasm)    Post-op pain       * STATIN NOT PRESCRIBED    INTENTIONAL    0 each    Reported on 3/2/2017        * Notice:  This list has 5 medication(s) that are the same as other medications prescribed for you. Read the directions carefully, and  ask your doctor or other care provider to review them with you.

## 2018-12-04 NOTE — LETTER
[facility]  [address]  [facility] [address]  Phone: [phone]    December 4, 2018        Rafat Baird  [id number]    [facility] [address]          To whom it may concern:    RE: Rafat DOHERTY Brie    Patient was seen and treated today at our clinic and missed work.    Please contact me for questions or concerns.      Sincerely,        Ricki Waldrop MD
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
## 2019-02-12 ENCOUNTER — OFFICE VISIT (OUTPATIENT)
Dept: URGENT CARE | Facility: RETAIL CLINIC | Age: 29
End: 2019-02-12
Payer: COMMERCIAL

## 2019-02-12 VITALS
OXYGEN SATURATION: 100 % | TEMPERATURE: 98.6 F | DIASTOLIC BLOOD PRESSURE: 78 MMHG | SYSTOLIC BLOOD PRESSURE: 133 MMHG | HEART RATE: 82 BPM

## 2019-02-12 DIAGNOSIS — A08.4 VIRAL ENTERITIS: Primary | ICD-10-CM

## 2019-02-12 PROCEDURE — 99213 OFFICE O/P EST LOW 20 MIN: CPT | Performed by: FAMILY MEDICINE
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
## 2019-02-12 NOTE — LETTER
February 12, 2019      Rafat Baird  [address] [address]   [place] [address]        To Whom It May Concern:    Rafat Baird was seen in our clinic. He may return to work Thursday, 2/14  without restrictions.      Sincerely,        Ricki Waldrop MD

## 2019-05-28 DIAGNOSIS — E10.43 TYPE 1 DIABETES MELLITUS WITH PERIPHERAL AUTONOMIC NEUROPATHY (H): ICD-10-CM

## 2019-05-29 RX ORDER — INSULIN GLARGINE 100 [IU]/ML
INJECTION, SOLUTION SUBCUTANEOUS
Qty: 15 ML | Refills: 0 | Status: SHIPPED | OUTPATIENT
Start: 2019-05-29 | End: 2019-11-22

## 2019-07-29 ENCOUNTER — OFFICE VISIT (OUTPATIENT)
Dept: ORTHOPEDICS | Facility: CLINIC | Age: 29
End: 2019-07-29
Payer: COMMERCIAL

## 2019-07-29 ENCOUNTER — ANCILLARY PROCEDURE (OUTPATIENT)
Dept: GENERAL RADIOLOGY | Facility: CLINIC | Age: 29
End: 2019-07-29
Attending: PHYSICIAN ASSISTANT
Payer: COMMERCIAL

## 2019-07-29 VITALS
HEIGHT: 71 IN | SYSTOLIC BLOOD PRESSURE: 109 MMHG | WEIGHT: 169 LBS | BODY MASS INDEX: 23.66 KG/M2 | DIASTOLIC BLOOD PRESSURE: 74 MMHG

## 2019-07-29 DIAGNOSIS — M25.579 ANKLE PAIN: ICD-10-CM

## 2019-07-29 DIAGNOSIS — Z98.890 STATUS POST OPEN REDUCTION WITH INTERNAL FIXATION (ORIF) OF FRACTURE OF ANKLE: Primary | ICD-10-CM

## 2019-07-29 DIAGNOSIS — R29.898 ANKLE WEAKNESS: ICD-10-CM

## 2019-07-29 DIAGNOSIS — Z87.81 STATUS POST OPEN REDUCTION WITH INTERNAL FIXATION (ORIF) OF FRACTURE OF ANKLE: Primary | ICD-10-CM

## 2019-07-29 PROCEDURE — 73610 X-RAY EXAM OF ANKLE: CPT | Mod: TC

## 2019-07-29 PROCEDURE — 99213 OFFICE O/P EST LOW 20 MIN: CPT | Performed by: PHYSICIAN ASSISTANT

## 2019-07-29 ASSESSMENT — MIFFLIN-ST. JEOR: SCORE: 1743.71

## 2019-07-29 ASSESSMENT — PAIN SCALES - GENERAL: PAINLEVEL: MODERATE PAIN (4)
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
## 2019-07-29 NOTE — PATIENT INSTRUCTIONS
Encounter Diagnosis   Name Primary?     Ankle pain Yes     Rest, ice and elevate above heart level as needed for pain control  1.  X-ray: We can see an x-ray that your fracture continues to progress with its healing.  You do have some angulation in 2 different planes on your ankle.  You have been compensating well.  We do see that the plate on the distal end of the fibula is up slightly.  This is the same as it was  in the previous x-ray.  2.  Exam: We can see that when you walk you invert easily.  3.  Orthotics referral: We would like you to get inserts or orthotics to help support your ankle so he can walk on uneven ground and you can walk when you are not wearing high lace up boot.  4.  Physical therapy referral: They will call you in a day or 2 to set this up.  They will focus on watching you walk and helping you strengthen your ankle and walk in a more stable fashion.  5.  Podiatry/ankle specialist referral: I talked to Dr. Gates today he is ankle and foot specialist.  He agrees that the hardware could come out at this point.  I would like you to meet him and discussed this with him.  6.  Work: We did a work note stating that you are here today.  7. Follow up with Dae Jamison PA-C on an as needed basis.   [employer] and [email or web address] may offer reliable information regarding your diagnosis and treatment plan.    THANK YOU for coming in today. If you receive a survey via [employer] or mail please let us know if there was anything you especially appreciated today or if there is any way we can improve our clinic. We appreciate your input.    GENERAL INFORMATION:  Our hours are:  Monday :     Clinic 7:30 AM-430 PM ([facility])  Tuesday:      Operating Room All Day ([facility])  Wednesday: Clinic 7:30 AM - 11:15 AM ([facility])             Clinic 1:00 PM - 4:00PM ([facility])  Thursday:     Administrative Day  Friday:          Clinic 7:30 AM - 11:15 AM  [facility]            Clinic 1:00 PM - 4:00 PM ([facility])      [facility] for any issues or concerns: [phone]      We are not in the office Thursdays. Therefore non- urgent calls and medical messages received on Thursday will be addressed when we are back in the office on Wednesday. Urgent matters will be reviewed and addressed by one of our partners in the office as needed.    If lab work was done today as part of your evaluation you will generally be contacted via [employer], mail, or phone with the results within 1-5 days. If there is an alarming result we will contact you by phone. Lab results come back at varying times, I generally wait until all labs are resulted before making comments on results. Please note labs are automatically released to [employer] (if you have signed up for it) once available-at times you may see these prior to my having a chance to review them as well.    If you need refills please contact your pharmacist. They will send a refill request to me to review. Please allow 3 business days for us to process all refill requests. All narcotic refills should be handled in the clinic at the time of your visit.

## 2019-07-29 NOTE — LETTER
[address] [address]  Phone: [phone]  Fax: [phone]    July 29, 2019        Rafat Baird  [id number]    [facility] [address]          To whom it may concern:    RE: Rafat Martínezin    Patient was seen and treated today at our clinic.    Please contact me for questions or concerns.      Sincerely,        Dae Jamison PA-C

## 2019-08-05 ENCOUNTER — ANCILLARY PROCEDURE (OUTPATIENT)
Dept: GENERAL RADIOLOGY | Facility: CLINIC | Age: 29
End: 2019-08-05
Attending: PHYSICIAN ASSISTANT
Payer: COMMERCIAL

## 2019-08-05 ENCOUNTER — OFFICE VISIT (OUTPATIENT)
Dept: ORTHOPEDICS | Facility: CLINIC | Age: 29
End: 2019-08-05
Payer: COMMERCIAL

## 2019-08-05 ENCOUNTER — OFFICE VISIT (OUTPATIENT)
Dept: PODIATRY | Facility: CLINIC | Age: 29
End: 2019-08-05
Payer: COMMERCIAL

## 2019-08-05 VITALS
HEIGHT: 71 IN | DIASTOLIC BLOOD PRESSURE: 68 MMHG | SYSTOLIC BLOOD PRESSURE: 106 MMHG | WEIGHT: 169 LBS | BODY MASS INDEX: 23.66 KG/M2

## 2019-08-05 VITALS
DIASTOLIC BLOOD PRESSURE: 68 MMHG | HEIGHT: 71 IN | BODY MASS INDEX: 23.66 KG/M2 | WEIGHT: 169 LBS | SYSTOLIC BLOOD PRESSURE: 106 MMHG

## 2019-08-05 DIAGNOSIS — Z96.9 PRESENCE OF RETAINED HARDWARE: Primary | ICD-10-CM

## 2019-08-05 DIAGNOSIS — M21.962 FOOT DEFORMITY, ACQUIRED, LEFT: ICD-10-CM

## 2019-08-05 DIAGNOSIS — E10.43 TYPE 1 DIABETES MELLITUS WITH PERIPHERAL AUTONOMIC NEUROPATHY (H): ICD-10-CM

## 2019-08-05 DIAGNOSIS — M25.551 RIGHT HIP PAIN: ICD-10-CM

## 2019-08-05 DIAGNOSIS — M70.61 GREATER TROCHANTERIC BURSITIS OF RIGHT HIP: Primary | ICD-10-CM

## 2019-08-05 PROCEDURE — 20610 DRAIN/INJ JOINT/BURSA W/O US: CPT | Mod: RT | Performed by: PHYSICIAN ASSISTANT

## 2019-08-05 PROCEDURE — 73502 X-RAY EXAM HIP UNI 2-3 VIEWS: CPT | Mod: TC

## 2019-08-05 PROCEDURE — 99213 OFFICE O/P EST LOW 20 MIN: CPT | Mod: 25 | Performed by: PHYSICIAN ASSISTANT

## 2019-08-05 PROCEDURE — 99243 OFF/OP CNSLTJ NEW/EST LOW 30: CPT | Performed by: PODIATRIST

## 2019-08-05 RX ORDER — CEFAZOLIN SODIUM 1 G/50ML
1 SOLUTION INTRAVENOUS SEE ADMIN INSTRUCTIONS
Status: CANCELLED | OUTPATIENT
Start: 2019-08-05

## 2019-08-05 RX ORDER — TRIAMCINOLONE ACETONIDE 40 MG/ML
40 INJECTION, SUSPENSION INTRA-ARTICULAR; INTRAMUSCULAR ONCE
Status: COMPLETED | OUTPATIENT
Start: 2019-08-05 | End: 2019-08-05

## 2019-08-05 RX ORDER — CEFAZOLIN SODIUM 2 G/100ML
2 INJECTION, SOLUTION INTRAVENOUS
Status: CANCELLED | OUTPATIENT
Start: 2019-08-05

## 2019-08-05 RX ADMIN — TRIAMCINOLONE ACETONIDE 40 MG: 40 INJECTION, SUSPENSION INTRA-ARTICULAR; INTRAMUSCULAR at 16:16

## 2019-08-05 ASSESSMENT — PAIN SCALES - GENERAL
PAINLEVEL: MILD PAIN (2)
PAINLEVEL: MODERATE PAIN (5)

## 2019-08-05 ASSESSMENT — MIFFLIN-ST. JEOR
SCORE: 1743.71
SCORE: 1743.71
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
## 2019-08-05 NOTE — PROGRESS NOTES
Prior to injection, verified patient identity using patient's name and date of birth.  Due to injection administration, patient instructed to remain in clinic for 15 minutes  afterwards, and to report any adverse reaction to me immediately.    Joint injection was performed.      Drug Amount Wasted:  None.  Vial/Syringe: Single dose vial  : [employer]  EXPIRATION DATE:  2/2021  NDC: [address]-1  Lot   [id number]  Rt hip   Dae Jamison PA-C
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
## 2019-08-05 NOTE — PATIENT INSTRUCTIONS
Encounter Diagnosis   Name Primary?     Greater trochanteric bursitis of right hip Yes      Rest, ice and elevate above heart level as needed for pain control  1. On exam, we feel this is greater trochanteric bursitis.  This is a pad/sac on the outside of your hip that protects the bony part of the side of your hip. It helps tendons and ligaments glide over it.  Sometimes it gets inflamed and painful.    X-ray: On your x-ray it looks like it would be your joint.  We can see where your acetabulum was fractured and repaired.  Now you have bad arthritis on that right hip however on exam it does not seem that the arthritis is bothering you right now.  There is some calcification on the outside of your hip where you are having pain.  2.  This usually goes away with time, exercises/stretching, cortisone injections and NSAIDs medications.  3. We decided on the cortisone injection today.  4.  Therapy: You can always do formal therapy but we decided to do home exercises for now, I have the main stretch below.  5.  Follow up with Dae Jamison PA-C on an as needed basis.   Cortisone Instructions:     1. You received an injection of cortisone into your right hip in the greater trochanter region today.  2. The joint(s) may be more painful for the first 1-2 days.  3. We ask you to continue to rest the joint(s) for a few more days before resuming regular activities.  4. Pain Medications you can take (as long as your primary care provider allows these meds and you do not have kidney or liver conditions):  Tylenol  Take 1000 mg by mouth every 6 hours as needed; maximum dose 4000 mg a day  Ibuprofen  600 mg every 6 hours as needed; maximum 2400 mg a day  (OK to take tylenol and ibuprofen at the same time)  5. Rest, ice and elevate as needed for pain control  6. Watch for these signs of infection: redness, swelling, drainage, warmth to touch, increased pain, or fever. Call the clinic or make an appointment to be seen if you think you  have an infection.  7. If you are diabetic, make sure you keep a close eye on your blood sugars, they can get elevated with cortisone injections.   8. Sometimes it can take 1-2 weeks for it to reach its full effect.    Cortisone Injections  Cortisone is a type of steroid. It can greatly reduce swelling, redness, and irritation (inflammation) and pain. Being injected with cortisone is simple and doesn t take long. Your doctor may ask you questions about your health. Certain health conditions, such as diabetes, can be affected by cortisone.     Your pain may be relieved by a cortisone injection.   Why have a cortisone injection?  Injecting cortisone can relieve pain for anything from a sports injury to arthritis. Your doctor may suggest an injection if rest, splints, or oral medicine doesn t relieve your pain. Injecting cortisone is simpler than having surgery. And cortisone may provide the lasting pain relief that can help you get out and enjoy life again.  Getting the injection  Your doctor will start by cleaning and occasionally numbing your skin at the injection site. Next you ll be injected with local anesthetics (for short-term pain relief) and cortisone. The injection may last a few moments. A small bandage will be put over the injection site. You ll then be ready to go home.  After your injection  After being injected, make sure you don t injure the treated region. But stay active. Enjoy a walk or some other mild activity. Just be careful not to strain the region that gave you trouble.  The next day  Some people feel more pain after being injected. This is normal, and it will go away soon. Applying ice for 20 minutes at a time to your injury may reduce the increased pain. Rest for the first day or two. You don t need to stay in bed. But avoid tasks that may strain the injured region.  If you have diabetes  Cortisone injections can cause blood sugar to be increased for several days after the injection. If you  have diabetes, you should follow your blood  sugar closely during this time. Follow your regular plan for what to do when your blood sugar is elevated.     9460-9354 [employer]. [address] [place] [address], [place] [address]. All rights reserved. This information is not intended as a substitute for professional medical care. Always follow your healthcare professional's instructions.      Understanding Trochanteric Bursitis    A bursa is a thin, slippery, sac-like film. It contains a small amount of fluid. This structure is found between bones and soft tissues in and around joints. A bursa cushions and protects a joint. It keeps parts of a joint from rubbing against each other. If a bursa becomes inflamed and irritated, it is known as bursitis.  The trochanteric bursa is found on the hip joint. It lies on top of the bump at the top of the thighbone called the greater trochanter. Inflammation of this bursa is called trochanteric bursitis.      How to say it  [id number]   Causes of trochanteric bursitis  Causes may include:    Overuse of the hip during running or other sports, dance, or work    Falling on or irritation to the side of the hip  This condition may occur along with other problems, such as osteoarthritis of the hip or knee, or low back problems. In rare cases, it may occur after hip surgery.  Symptoms of trochanteric bursitis    Pain or aching on the side of the hip. The pain may travel down the leg.    Swelling, tenderness, or warmth on the side of the hip at the bony bump at the top of the thigh  Treatment for trochanteric bursitis  These may include:    Resting the hip. This allows the bursa to heal.    Prescription or over-the-counter pain medicines. These help reduce inflammation, swelling, and pain.    Cold packs and heat packs. These help reduce pain and swelling.    Stretching and strengthening exercises. These improve flexibility and strength around the hip.    Physical therapy.  This includes exercises or other treatments.    Injections of medicine into the bursa. This may help reduce inflammation and relieve symptoms.  Possible complications  If you don t give your hip time to heal, the problem may not go away, may return, or may get worse. Rest and treat your hip as directed.      When to call your healthcare provider  Call your healthcare provider right away if you have any of these:    Fever of 100.4 F (38 C) or higher, or as directed    Redness, swelling, or warmth that gets worse    Symptoms that don t get better with prescribed medicines, or get worse    New symptoms   Date Last Reviewed: 3/29/2016    4017-9191 [employer]. [address] [place] [address]. All rights reserved. This information is not intended as a substitute for professional medical care. Always follow your healthcare professional's instructions.       Iliotibial Band Stretch (Flexibility)    1. Stand next to a chair. Hold onto the chair with your right hand for support. Cross your right leg behind your left leg.  2. Lean your right hip toward the right. Feel the stretch at the outside of your hip.  3. Hold for 30 to 60 seconds. Then relax.  4. Repeat 2 times, or as instructed.  5. Switch sides and repeat.  6. Do this 3 times a day, or as instructed.     Tip: Don t bend forward or twist at the waist.   Date Last Reviewed: 3/29/2016    4942-0526 [employer]. [address] [place] [address]. All rights reserved. This information is not intended as a substitute for professional medical care. Always follow your healthcare professional's instructions.          [email or web address] and [employer] may offer reliable information regarding your diagnosis and treatment plan.    THANK YOU for coming in today. If you receive a survey via [employer] or mail please let us know if there was anything you especially appreciated today or if there is any way we can improve our clinic. We appreciate  your input.    GENERAL INFORMATION:  Our hours are:  Monday :     Clinic 7:30 AM-430 PM ([facility])  Tuesday:      Operating Room All Day ([facility])  Wednesday: Clinic 7:30 AM - 11:15 AM ([facility])             Clinic 1:00 PM - 4:00PM ([facility])  Thursday:     Administrative Day  Friday:          Clinic 7:30 AM - 11:15 AM ([facility])            Clinic 1:00 PM - 4:00 PM ([facility])    [facility] for any issues or concerns: [phone]      We are not in the office Thursdays. Therefore non- urgent calls and medical messages received on Thursday will be addressed when we are back in the office on Wednesday. Urgent matters will be reviewed and addressed by one of our partners in the office as needed.    If lab work was done today as part of your evaluation you will generally be contacted via [employer], mail, or phone with the results within 1-5 days. If there is an alarming result we will contact you by phone. Lab results come back at varying times, I generally wait until all labs are resulted before making comments on results. Please note labs are automatically released to [employer] (if you have signed up for it) once available-at times you may see these prior to my having a chance to review them as well.    If you need refills please contact your pharmacist. They will send a refill request to me to review. Please allow 3 business days for us to process all refill requests. All narcotic refills should be handled in the clinic at the time of your visit.
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
## 2019-08-06 ENCOUNTER — TELEPHONE (OUTPATIENT)
Dept: PODIATRY | Facility: CLINIC | Age: 29
End: 2019-08-06

## 2019-08-08 NOTE — TELEPHONE ENCOUNTER
Type of surgery: excision hardware left fibula  Location of surgery: [facility]   Date of surgery: 9/6/19  Surgeon: Dr. Gates  Pre-Op Appt Date: 8/30/19  Post-Op Appt Date: 9/12, 9/19   Packet sent out: Surgery packet mailed to patient's home address.   Pre-cert/Authorization completed: NA  Date: 8/8/2019    Alma Briggs  Surgery Scheduler

## 2019-08-30 ENCOUNTER — OFFICE VISIT (OUTPATIENT)
Dept: FAMILY MEDICINE | Facility: CLINIC | Age: 29
End: 2019-08-30
Payer: COMMERCIAL

## 2019-08-30 VITALS
DIASTOLIC BLOOD PRESSURE: 62 MMHG | BODY MASS INDEX: 23.35 KG/M2 | WEIGHT: 166.8 LBS | SYSTOLIC BLOOD PRESSURE: 108 MMHG | OXYGEN SATURATION: 98 % | HEIGHT: 71 IN | RESPIRATION RATE: 16 BRPM | TEMPERATURE: 98.6 F | HEART RATE: 90 BPM

## 2019-08-30 DIAGNOSIS — S82.302D CLOSED FRACTURE OF DISTAL END OF LEFT FIBULA AND TIBIA WITH ROUTINE HEALING, SUBSEQUENT ENCOUNTER: ICD-10-CM

## 2019-08-30 DIAGNOSIS — S82.832D CLOSED FRACTURE OF DISTAL END OF LEFT FIBULA AND TIBIA WITH ROUTINE HEALING, SUBSEQUENT ENCOUNTER: ICD-10-CM

## 2019-08-30 DIAGNOSIS — E10.43 TYPE 1 DIABETES MELLITUS WITH PERIPHERAL AUTONOMIC NEUROPATHY (H): ICD-10-CM

## 2019-08-30 DIAGNOSIS — Z01.818 PREOP GENERAL PHYSICAL EXAM: Primary | ICD-10-CM

## 2019-08-30 LAB — HBA1C MFR BLD: 7.5 % (ref 0–5.6)

## 2019-08-30 PROCEDURE — 36415 COLL VENOUS BLD VENIPUNCTURE: CPT | Performed by: FAMILY MEDICINE

## 2019-08-30 PROCEDURE — 83036 HEMOGLOBIN GLYCOSYLATED A1C: CPT | Performed by: FAMILY MEDICINE

## 2019-08-30 PROCEDURE — 99214 OFFICE O/P EST MOD 30 MIN: CPT | Performed by: FAMILY MEDICINE

## 2019-08-30 ASSESSMENT — MIFFLIN-ST. JEOR: SCORE: 1733.73
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
## 2019-09-05 ENCOUNTER — ANESTHESIA EVENT (OUTPATIENT)
Dept: SURGERY | Facility: CLINIC | Age: 29
End: 2019-09-05
Payer: COMMERCIAL

## 2019-09-05 ASSESSMENT — LIFESTYLE VARIABLES: TOBACCO_USE: 1
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
## 2019-09-06 ENCOUNTER — ANESTHESIA (OUTPATIENT)
Dept: SURGERY | Facility: CLINIC | Age: 29
End: 2019-09-06
Payer: COMMERCIAL

## 2019-09-06 ENCOUNTER — HOSPITAL ENCOUNTER (OUTPATIENT)
Dept: GENERAL RADIOLOGY | Facility: CLINIC | Age: 29
End: 2019-09-06
Attending: PODIATRIST | Admitting: PODIATRIST
Payer: COMMERCIAL

## 2019-09-06 ENCOUNTER — HOSPITAL ENCOUNTER (OUTPATIENT)
Facility: CLINIC | Age: 29
Discharge: HOME | End: 2019-09-06
Attending: PODIATRIST | Admitting: PODIATRIST
Payer: COMMERCIAL

## 2019-09-06 VITALS
TEMPERATURE: 98 F | DIASTOLIC BLOOD PRESSURE: 60 MMHG | RESPIRATION RATE: 18 BRPM | OXYGEN SATURATION: 100 % | SYSTOLIC BLOOD PRESSURE: 92 MMHG | HEART RATE: 62 BPM

## 2019-09-06 DIAGNOSIS — T84.84XA PAINFUL ORTHOPAEDIC HARDWARE (H): ICD-10-CM

## 2019-09-06 DIAGNOSIS — Z96.9 PRESENCE OF RETAINED HARDWARE: ICD-10-CM

## 2019-09-06 DIAGNOSIS — E10.43 TYPE 1 DIABETES MELLITUS WITH PERIPHERAL AUTONOMIC NEUROPATHY (H): ICD-10-CM

## 2019-09-06 DIAGNOSIS — M21.962 FOOT DEFORMITY, ACQUIRED, LEFT: ICD-10-CM

## 2019-09-06 LAB
GLUCOSE BLDC GLUCOMTR-MCNC: 112 MG/DL (ref 70–99)
GLUCOSE BLDC GLUCOMTR-MCNC: 90 MG/DL (ref 70–99)

## 2019-09-06 PROCEDURE — 36000056 ZZH SURGERY LEVEL 3 1ST 30 MIN: Performed by: PODIATRIST

## 2019-09-06 PROCEDURE — 82962 GLUCOSE BLOOD TEST: CPT

## 2019-09-06 PROCEDURE — 71000027 ZZH RECOVERY PHASE 2 EACH 15 MINS: Performed by: PODIATRIST

## 2019-09-06 PROCEDURE — 27210794 ZZH OR GENERAL SUPPLY STERILE: Performed by: PODIATRIST

## 2019-09-06 PROCEDURE — 25000125 ZZHC RX 250: Performed by: NURSE ANESTHETIST, CERTIFIED REGISTERED

## 2019-09-06 PROCEDURE — 37000008 ZZH ANESTHESIA TECHNICAL FEE, 1ST 30 MIN: Performed by: PODIATRIST

## 2019-09-06 PROCEDURE — 40000306 ZZH STATISTIC PRE PROC ASSESS II: Performed by: PODIATRIST

## 2019-09-06 PROCEDURE — 25000128 H RX IP 250 OP 636: Performed by: PODIATRIST

## 2019-09-06 PROCEDURE — 20680 REMOVAL OF IMPLANT DEEP: CPT | Mod: LT | Performed by: PODIATRIST

## 2019-09-06 PROCEDURE — 36000058 ZZH SURGERY LEVEL 3 EA 15 ADDTL MIN: Performed by: PODIATRIST

## 2019-09-06 PROCEDURE — 25800030 ZZH RX IP 258 OP 636: Performed by: NURSE ANESTHETIST, CERTIFIED REGISTERED

## 2019-09-06 PROCEDURE — 37000009 ZZH ANESTHESIA TECHNICAL FEE, EACH ADDTL 15 MIN: Performed by: PODIATRIST

## 2019-09-06 PROCEDURE — 40000277 XR SURGERY CARM FLUORO LESS THAN 5 MIN W STILLS

## 2019-09-06 PROCEDURE — 25000128 H RX IP 250 OP 636: Performed by: NURSE ANESTHETIST, CERTIFIED REGISTERED

## 2019-09-06 RX ORDER — SODIUM CHLORIDE, SODIUM LACTATE, POTASSIUM CHLORIDE, CALCIUM CHLORIDE 600; 310; 30; 20 MG/100ML; MG/100ML; MG/100ML; MG/100ML
INJECTION, SOLUTION INTRAVENOUS CONTINUOUS
Status: DISCONTINUED | OUTPATIENT
Start: 2019-09-06 | End: 2019-09-06 | Stop reason: HOSPADM

## 2019-09-06 RX ORDER — ONDANSETRON 2 MG/ML
INJECTION INTRAMUSCULAR; INTRAVENOUS PRN
Status: DISCONTINUED | OUTPATIENT
Start: 2019-09-06 | End: 2019-09-06

## 2019-09-06 RX ORDER — ONDANSETRON 4 MG/1
4 TABLET, ORALLY DISINTEGRATING ORAL EVERY 30 MIN PRN
Status: DISCONTINUED | OUTPATIENT
Start: 2019-09-06 | End: 2019-09-06 | Stop reason: HOSPADM

## 2019-09-06 RX ORDER — PROPOFOL 10 MG/ML
INJECTION, EMULSION INTRAVENOUS CONTINUOUS PRN
Status: DISCONTINUED | OUTPATIENT
Start: 2019-09-06 | End: 2019-09-06

## 2019-09-06 RX ORDER — CEFAZOLIN SODIUM 2 G/100ML
2 INJECTION, SOLUTION INTRAVENOUS
Status: COMPLETED | OUTPATIENT
Start: 2019-09-06 | End: 2019-09-06

## 2019-09-06 RX ORDER — FENTANYL CITRATE 50 UG/ML
25-50 INJECTION, SOLUTION INTRAMUSCULAR; INTRAVENOUS
Status: DISCONTINUED | OUTPATIENT
Start: 2019-09-06 | End: 2019-09-06 | Stop reason: HOSPADM

## 2019-09-06 RX ORDER — BUPIVACAINE HYDROCHLORIDE 5 MG/ML
INJECTION, SOLUTION PERINEURAL PRN
Status: DISCONTINUED | OUTPATIENT
Start: 2019-09-06 | End: 2019-09-06 | Stop reason: HOSPADM

## 2019-09-06 RX ORDER — LABETALOL HYDROCHLORIDE 5 MG/ML
10 INJECTION, SOLUTION INTRAVENOUS
Status: DISCONTINUED | OUTPATIENT
Start: 2019-09-06 | End: 2019-09-06 | Stop reason: HOSPADM

## 2019-09-06 RX ORDER — HYDROXYZINE HYDROCHLORIDE 25 MG/1
25 TABLET, FILM COATED ORAL
Status: DISCONTINUED | OUTPATIENT
Start: 2019-09-06 | End: 2019-09-06 | Stop reason: HOSPADM

## 2019-09-06 RX ORDER — EPHEDRINE SULFATE 50 MG/ML
INJECTION, SOLUTION INTRAMUSCULAR; INTRAVENOUS; SUBCUTANEOUS PRN
Status: DISCONTINUED | OUTPATIENT
Start: 2019-09-06 | End: 2019-09-06

## 2019-09-06 RX ORDER — OXYCODONE AND ACETAMINOPHEN 5; 325 MG/1; MG/1
1-2 TABLET ORAL EVERY 4 HOURS PRN
Qty: 16 TABLET | Refills: 0 | Status: SHIPPED | OUTPATIENT
Start: 2019-09-06 | End: 2020-07-29

## 2019-09-06 RX ORDER — ONDANSETRON 4 MG/1
4 TABLET, ORALLY DISINTEGRATING ORAL
Status: DISCONTINUED | OUTPATIENT
Start: 2019-09-06 | End: 2019-09-06 | Stop reason: HOSPADM

## 2019-09-06 RX ORDER — CEFAZOLIN SODIUM 1 G/3ML
1 INJECTION, POWDER, FOR SOLUTION INTRAMUSCULAR; INTRAVENOUS SEE ADMIN INSTRUCTIONS
Status: DISCONTINUED | OUTPATIENT
Start: 2019-09-06 | End: 2019-09-06 | Stop reason: HOSPADM

## 2019-09-06 RX ORDER — FENTANYL CITRATE 50 UG/ML
INJECTION, SOLUTION INTRAMUSCULAR; INTRAVENOUS PRN
Status: DISCONTINUED | OUTPATIENT
Start: 2019-09-06 | End: 2019-09-06

## 2019-09-06 RX ORDER — LIDOCAINE HYDROCHLORIDE 20 MG/ML
INJECTION, SOLUTION INFILTRATION; PERINEURAL PRN
Status: DISCONTINUED | OUTPATIENT
Start: 2019-09-06 | End: 2019-09-06

## 2019-09-06 RX ORDER — ONDANSETRON 2 MG/ML
4 INJECTION INTRAMUSCULAR; INTRAVENOUS EVERY 30 MIN PRN
Status: DISCONTINUED | OUTPATIENT
Start: 2019-09-06 | End: 2019-09-06 | Stop reason: HOSPADM

## 2019-09-06 RX ORDER — OXYCODONE HYDROCHLORIDE 5 MG/1
10 TABLET ORAL
Status: DISCONTINUED | OUTPATIENT
Start: 2019-09-06 | End: 2019-09-06 | Stop reason: HOSPADM

## 2019-09-06 RX ORDER — MEPERIDINE HYDROCHLORIDE 50 MG/ML
12.5 INJECTION INTRAMUSCULAR; INTRAVENOUS; SUBCUTANEOUS
Status: DISCONTINUED | OUTPATIENT
Start: 2019-09-06 | End: 2019-09-06 | Stop reason: HOSPADM

## 2019-09-06 RX ORDER — NALOXONE HYDROCHLORIDE 0.4 MG/ML
.1-.4 INJECTION, SOLUTION INTRAMUSCULAR; INTRAVENOUS; SUBCUTANEOUS
Status: DISCONTINUED | OUTPATIENT
Start: 2019-09-06 | End: 2019-09-06 | Stop reason: HOSPADM

## 2019-09-06 RX ADMIN — Medication 5 MG: at 07:53

## 2019-09-06 RX ADMIN — MIDAZOLAM 1 MG: 1 INJECTION INTRAMUSCULAR; INTRAVENOUS at 07:29

## 2019-09-06 RX ADMIN — ONDANSETRON 4 MG: 2 INJECTION INTRAMUSCULAR; INTRAVENOUS at 07:47

## 2019-09-06 RX ADMIN — FENTANYL CITRATE 50 MCG: 50 INJECTION, SOLUTION INTRAMUSCULAR; INTRAVENOUS at 07:32

## 2019-09-06 RX ADMIN — Medication 5 MG: at 07:50

## 2019-09-06 RX ADMIN — CEFAZOLIN SODIUM 2 G: 2 INJECTION, SOLUTION INTRAVENOUS at 07:40

## 2019-09-06 RX ADMIN — PHENYLEPHRINE HYDROCHLORIDE 50 MCG: 10 INJECTION INTRAVENOUS at 07:51

## 2019-09-06 RX ADMIN — FENTANYL CITRATE 50 MCG: 50 INJECTION, SOLUTION INTRAMUSCULAR; INTRAVENOUS at 07:33

## 2019-09-06 RX ADMIN — MIDAZOLAM 1 MG: 1 INJECTION INTRAMUSCULAR; INTRAVENOUS at 07:30

## 2019-09-06 RX ADMIN — LIDOCAINE HYDROCHLORIDE 40 MG: 20 INJECTION, SOLUTION INFILTRATION; PERINEURAL at 07:32

## 2019-09-06 RX ADMIN — PROPOFOL 150 MCG/KG/MIN: 10 INJECTION, EMULSION INTRAVENOUS at 07:32

## 2019-09-06 RX ADMIN — SODIUM CHLORIDE, POTASSIUM CHLORIDE, SODIUM LACTATE AND CALCIUM CHLORIDE: 600; 310; 30; 20 INJECTION, SOLUTION INTRAVENOUS at 07:32

## 2019-09-06 NOTE — DISCHARGE INSTRUCTIONS
[facility]   Adult Discharge Orders & Instructions     For 24 hours after surgery    1. Get plenty of rest.  A responsible adult must stay with you for at least 24 hours after you leave the hospital.   2. Do not drive or use heavy equipment.  If you have weakness or tingling, don't drive or use heavy equipment until this feeling goes away.  3. Do not drink alcohol.  4. Avoid strenuous or risky activities.  Ask for help when climbing stairs.   5. You may feel lightheaded.  If so, sit for a few minutes before standing.  Have someone help you get up.   6. You may have a slight fever. Call the doctor if your fever is over 100 F (37.7 C) (taken under the tongue) or lasts longer than 24 hours.  7. You may have a dry mouth, a sore throat, muscle aches or trouble sleeping.  These should go away after 24 hours.  8. Do not make important or legal decisions.  We don t expect you to have any problems from the surgery or treatment you had today. Just in case, here s what to do if you have pain, upset stomach (nausea), bleeding or infection:  Pain:  Take medicines your doctor has prescribed or over-the-counter medicine they have suggested. Resting and using ice packs can help, too. For surgery on an arm or leg, raise it on a pillow to ease swelling. Call your doctor if these methods don t work.  Copyright [employer], Licensed under [employer]  Upset stomach (nausea):  Take anti-nausea medicine approved by your doctor. Drink clear liquids like apple juice, ginger ale, broth or 7-Up. Be sure to drink enough fluids. Rest can help, too. Move to normal foods when you re ready. Bleeding:  In the first 24 hours, you may see a little blood on your dressing, about the size of a quarter. You don t need to worry about this much blood, but if the blood spot keeps getting bigger:    Put pressure on the wound if you can, AND    Call your doctor.  Copyright [employer], Licensed under [employer]  International  Fever/Infection: Please call your doctor if you have any of these signs:    Redness    Swelling    Wound feels warm    Pain gets worse    Bad-smelling fluid leaks from wound    Fever or chills  Call your doctor for any of the followin.  It has been over 8 to 10 hours since surgery and you are still not able to urinate (pass water).    Nurse advice line: [phone]

## 2019-09-06 NOTE — ANESTHESIA CARE TRANSFER NOTE
Patient: Rafat Baird    Procedure(s):  Excision Hardware Left Fibula    Diagnosis: Painful hardware left fibula  Diagnosis Additional Information: No value filed.    Anesthesia Type:   MAC     Note:  Airway :Room Air  Patient transferred to:Phase II  Handoff Report: Reviewed the pertinent medical history, Discussed the surgical course, Reviewed Intra-OP anesthesia mangement and issues during anesthesia, Set expectations for post-procedure period, Allowed opportunity for questions and acknowledgement of understanding, Identified the Reponsible Provider and Identifed the Patient      Vitals: (Last set prior to Anesthesia Care Transfer)    CRNA VITALS  9/6/2019 0749 - 9/6/2019 0849      9/6/2019             Pulse:  72    SpO2:  99 %    Resp Rate (observed):  4  (Abnormal)                 Electronically Signed By: MIGUEL No CRNA  September 6, 2019  9:02 AM

## 2019-09-06 NOTE — ANESTHESIA POSTPROCEDURE EVALUATION
Patient: Rafat Baird    Procedure(s):  Excision Hardware Left Fibula    Diagnosis:Painful hardware left fibula  Diagnosis Additional Information: No value filed.    Anesthesia Type:  MAC    Note:  Anesthesia Post Evaluation    Patient location during evaluation: Phase 2  Patient participation: Able to fully participate in evaluation  Level of consciousness: awake  Pain management: adequate  Airway patency: patent  Cardiovascular status: acceptable  Respiratory status: acceptable  Hydration status: acceptable  PONV: none             Last vitals:  Vitals:    09/06/19 0830 09/06/19 0845 09/06/19 0858   BP: 102/66 94/43 92/60   Pulse: 56 59 62   Resp:      Temp:      SpO2: 100% 100% 100%         Electronically Signed By: MIGUEL No CRNA  September 6, 2019  9:04 AM

## 2019-09-06 NOTE — OP NOTE
Procedure Date: 09/06/2019      SURGEON:  Hernando Gates DPM      ASSISTANT:  None.      PREOPERATIVE DIAGNOSIS:  Painful retained hardware, left fibula.      POSTOPERATIVE DIAGNOSIS:  Painful retained hardware, left fibula.      DESCRIPTION OF PROCEDURE:  In the preoperative holding area, informed consent was obtained.  We discussed potential risks, complications and alternative treatment options.  No guarantees were given or implied.  The patient wished to proceed with surgery.  No contraindications were noted.      He was brought to the operating room, placed on the operating table in a supine position.  He was given monitored anesthetic care by the Anesthesia Department.  Approximately 10 mL of 0.5% Marcaine plain was injected about the surgical site to achieve local anesthesia.  A mid-thigh tourniquet was placed, and the foot was prepped and draped in the usual sterile fashion.  A timeout was performed.  The foot was exsanguinated with an Esmarch bandage.  The cuff was inflated to 250 mmHg, and the following procedure was performed.  A #15 blade was utilized to make a linear incision over the prominent hardware noted lateral to the fibula.  This hardware was tenting the skin, causing irritation.  The incision was placed deep to the hardware.  All soft tissue was retracted from the hardware.  Periosteum was elevated anterior and posterior to achieve access to the plate and screws.  It was noted the 2 distal screws were no longer even contacting the plate.  They were backed out by nearly a cm.  All screws were removed.  The plate was removed without complications.  Surgical site was flushed with copious amounts of sterile saline.  Deep periosteum was closed with a 3-0 Vicryl.  Skin was closed with 4-0 Vicryl and 4-0 Prolene.  Another 20 mL of 0.5% Marcaine plain were injected about the surgical site to achieve local anesthesia.  Adaptic and a bulky sterile compression dressing were applied.  X-ray was utilized to  confirm all hardware was removed from the fibula.  Hardware was not removed from the deep tibia in the area of the malunion of the tibia due to being very deep and the position and alignment of the bone.  This was not causing complications about the skin.  Therefore, it was left in place.  Tourniquet was released after approximately 20 minutes followed by immediate hyperemia to all 5 digits of the left ankle.         RICARDO MARQUEZ DPM             D: 2019   T: 2019   MT:       Name:     ANY HENNESSY   MRN:      [id number]        Account:        [id number]   :      1990           Procedure Date: 2019      Document: [id number]

## 2019-09-11 ENCOUNTER — TRANSFERRED RECORDS (OUTPATIENT)
Dept: HEALTH INFORMATION MANAGEMENT | Facility: CLINIC | Age: 29
End: 2019-09-11

## 2019-09-12 ENCOUNTER — ALLIED HEALTH/NURSE VISIT (OUTPATIENT)
Dept: FAMILY MEDICINE | Facility: CLINIC | Age: 29
End: 2019-09-12
Payer: COMMERCIAL

## 2019-09-12 VITALS — TEMPERATURE: 98.8 F

## 2019-09-12 DIAGNOSIS — Z48.89 ENCOUNTER FOR POSTOPERATIVE WOUND CARE: Primary | ICD-10-CM

## 2019-09-12 PROCEDURE — 99207 ZZC NO CHARGE NURSE ONLY: CPT

## 2019-09-12 NOTE — NURSING NOTE
Nurse Dressing change 1st Post-Op Visit:    Pain  Patient presents today s/p excision hardware left fibula per the order of Dr. Gates. Pain rates 0/10. Patient taking Ibuprofen.     Nursing Assessment  Patient tolerating a regular diet. Denies constipation. Patient has a non weight-bearing status and tolerating.    Incision  Dressing removed. Incision(s) is healing nicely without erythema, fluctuation, induration, drainage, or fever. Incision edges well approximated without signs of infection. Sutures intact. Capillary refill < 3 seconds. Able to move toes. Denies numbness/tingling as normal per patient. Color of foot is pink/normal. Patient has minimal bruising and minimal swelling surrounding incision.    Dressing Change  Applied adaptic to incision(s), 1 pack of gauze, 1 kerlix, 3 weberil, splint, and 2 ACE bandages. Patient tolerated with minimal discomfort.    Patient Questions:  All questions answered.      Clarissa Lugo RN  [facility]  [phone]  9/12/2019 2:53 PM

## 2019-09-19 ENCOUNTER — OFFICE VISIT (OUTPATIENT)
Dept: PODIATRY | Facility: CLINIC | Age: 29
End: 2019-09-19
Payer: COMMERCIAL

## 2019-09-19 VITALS
WEIGHT: 166 LBS | HEIGHT: 71 IN | DIASTOLIC BLOOD PRESSURE: 62 MMHG | BODY MASS INDEX: 23.24 KG/M2 | SYSTOLIC BLOOD PRESSURE: 110 MMHG | TEMPERATURE: 98.3 F

## 2019-09-19 DIAGNOSIS — E10.43 TYPE 1 DIABETES MELLITUS WITH PERIPHERAL AUTONOMIC NEUROPATHY (H): ICD-10-CM

## 2019-09-19 DIAGNOSIS — M21.962 FOOT DEFORMITY, ACQUIRED, LEFT: ICD-10-CM

## 2019-09-19 DIAGNOSIS — T84.84XA PAINFUL ORTHOPAEDIC HARDWARE (H): Primary | ICD-10-CM

## 2019-09-19 PROCEDURE — 99024 POSTOP FOLLOW-UP VISIT: CPT | Performed by: PODIATRIST

## 2019-09-19 ASSESSMENT — MIFFLIN-ST. JEOR: SCORE: 1730.1

## 2019-09-19 ASSESSMENT — PAIN SCALES - GENERAL: PAINLEVEL: NO PAIN (0)

## 2019-09-19 NOTE — PATIENT INSTRUCTIONS
Dr. Terry Huizar  Orthopaedic surgery   [address] [address], [place] [address]   [phone]

## 2019-09-19 NOTE — PROGRESS NOTES
"Chief Complaint   Patient presents with     Surgical Followup     (1w6d) WB w/post op shoe, no pain; DOS 9/6/2019 - Excision Hardware Left Fibula     Diabetes     type 1     Subjective: Patient reports for followup of DOS 9/6/2019 - Excision Hardware Left Fibula procedure.  Patient has not utilized pain medication.  Has returned to walking and most activities of daily living except is kept the wound dry.    Patient also request the brace to help the varus deformity of his leg.    EXAM:  No apparent distress, patient is relaxed and comfortable.   Vitals: /62 (BP Location: Left arm, Cuff Size: Adult Regular)   Temp 98.3  F (36.8  C) (Temporal)   Ht 1.803 m (5' 11\")   Wt 75.3 kg (166 lb)   BMI 23.15 kg/m    Vasc:  DP and PT pulses palpable bilateral, CFT immediate to all digits and surrounding the surgical site.  Neuro:  Light touch sensation intact about the digits. There is minimal to no appreciable numbness around the surgical incision with examination.  Derm: The incision is well approximated and dry. Sutures are intact. Mild edema as expected. There is no surrounding erythema, heat, drainage or other signs of infection or hematoma.    Patient does have a varus deformity of his left leg malunion following polytrauma.    Musc: Adequate reduction of deformity identified. No complications.  Reduced range of motion about the left ankle with a varus about the left leg.  Calcaneus is slightly inverted upon weightbearing.    Hemoglobin A1C (%)   Date Value   08/30/2019 7.5 (H)   11/03/2017 8.5 (H)   03/30/2017 8.6 (H)   10/12/2015 8.4 (H)   08/04/2014 8.0 (H)   11/20/2013 7.4 (H)   09/02/2013 7.1 (H)   01/31/2013 7.3 (H)     Creatinine (mg/dL)   Date Value   02/26/2018 0.77   11/03/2017 0.76   02/13/2017 0.65 (L)   03/16/2015 0.75   09/04/2014 0.84   11/20/2013 0.84       Assessment:      ICD-10-CM    1. Painful orthopaedic hardware (H) T84.84XA ORTHOTICS REFERRAL   2. Type 1 diabetes mellitus with peripheral " autonomic neuropathy (H) E10.43 ORTHOTICS REFERRAL   3. Foot deformity, acquired, left M21.962 ORTHOTICS REFERRAL       Plan:    9/19/2019  All sutures were removed.  Bulky gauze sterile compression dressing was applied  He can begin minimal dressings now or gentle compression  1 more week of minimal activity and no soaking or submersion but can begin gentle bathing  Then no restrictions  All questions were answered he was instructed to follow-up as needed.    Pt requests left ankle leg brace, AFO to reduce risk of inversion injury to lateral ankle.  It is not clear what kind of brace would be best for him.  He has rigid deformity.  He has tibial deformity.  Any sort of AFO that would remain relatively squared to the ground or shoe and provide some stability or load to the tibia.  He has had bracing in the past and will bring this upon presenting to the orthotist.  Even a standard AFO with minimal architecture about the plantar foot would likely protect his ankle joint some and slow progression and reduce risk of injury.          Hernando Gates DPM
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
## 2019-10-03 DIAGNOSIS — E10.43 TYPE 1 DIABETES MELLITUS WITH PERIPHERAL AUTONOMIC NEUROPATHY (H): ICD-10-CM

## 2019-10-03 RX ORDER — INSULIN GLARGINE 100 [IU]/ML
INJECTION, SOLUTION SUBCUTANEOUS
Qty: 15 ML | Refills: 1 | Status: SHIPPED | OUTPATIENT
Start: 2019-10-03 | End: 2019-11-15

## 2019-10-03 NOTE — TELEPHONE ENCOUNTER
"Forwarded to provider for review of medication and pending lab orders.    basaglar  Last Written Prescription Date:  06/27/2019  Last Fill Quantity: 15 ml,  # refills: 1   Last office visit: 08/30/2019 with prescribing provider:  Barrie   Future Office Visit:    Routing refill request to provider for review/approval because:  Labs out of range:  Creatnine and A1C  Labs not current:  LDL and microalbumin      Requested Prescriptions   Pending Prescriptions Disp Refills     insulin glargine (BASAGLAR KWIKPEN) 100 UNIT/ML pen [Pharmacy Med Name: BASAGLAR 100UNIT    INJ]  1     Sig: INJECT 45 UNITS SUBCUTANEOUSLY ONCE DAILY NEED  TO  MAKE  APPOINTMENT       Long Acting Insulin Protocol Failed - 10/3/2019  5:30 AM        Failed - LDL on file in past 12 months     Recent Labs   Lab Test 03/30/17  0925   LDL 92             Failed - Microalbumin on file in past 12 months     Recent Labs   Lab Test 03/30/17  0934   MICROL 6   UMALCR 27.31*             Failed - Serum creatinine on file in past 12 months     Recent Labs   Lab Test 02/26/18  1608   CR 0.77             Passed - Blood pressure less than 140/90 in past 6 months     BP Readings from Last 3 Encounters:   09/19/19 110/62   09/06/19 92/60   08/30/19 108/62                 Passed - HgbA1C in past 3 or 6 months     If HgbA1C is 8 or greater, it needs to be on file within the past 3 months.  If less than 8, must be on file within the past 6 months.     Recent Labs   Lab Test 08/30/19  1510   A1C 7.5*             Passed - Medication is active on med list        Passed - Patient is age 18 or older        Passed - Recent (6 mo) or future (30 days) visit within the authorizing provider's specialty     Patient had office visit in the last 6 months or has a visit in the next 30 days with authorizing provider or within the authorizing provider's specialty.  See \"Patient Info\" tab in inbasket, or \"Choose Columns\" in Meds & Orders section of the refill encounter.            "

## 2019-10-18 ENCOUNTER — OFFICE VISIT (OUTPATIENT)
Dept: URGENT CARE | Facility: RETAIL CLINIC | Age: 29
End: 2019-10-18
Payer: COMMERCIAL

## 2019-10-18 VITALS
TEMPERATURE: 98.3 F | DIASTOLIC BLOOD PRESSURE: 80 MMHG | RESPIRATION RATE: 16 BRPM | HEART RATE: 75 BPM | OXYGEN SATURATION: 100 % | SYSTOLIC BLOOD PRESSURE: 128 MMHG

## 2019-10-18 DIAGNOSIS — J01.10 ACUTE NON-RECURRENT FRONTAL SINUSITIS: Primary | ICD-10-CM

## 2019-10-18 PROCEDURE — 99213 OFFICE O/P EST LOW 20 MIN: CPT | Performed by: NURSE PRACTITIONER

## 2019-10-18 ASSESSMENT — ENCOUNTER SYMPTOMS
ADENOPATHY: 0
FEVER: 1
SHORTNESS OF BREATH: 0
CHILLS: 1
APPETITE CHANGE: 0
DIZZINESS: 0
SINUS PAIN: 1
SORE THROAT: 0
COUGH: 0
SINUS PRESSURE: 1
SLEEP DISTURBANCE: 0
FATIGUE: 0
WEAKNESS: 0
HEADACHES: 1
DIAPHORESIS: 1
WHEEZING: 0
CHEST TIGHTNESS: 0
LIGHT-HEADEDNESS: 0

## 2019-10-18 NOTE — PATIENT INSTRUCTIONS
Secondary bacterial infections only happen in about 0.5-2% of cases.  Antibiotics are recommended only if you do not have any relief from your symptoms in 10 days or symptoms worsen after 7 days.  Nasal congestion often starts clear then turns yellow or green towards the end- this is not a sign of a bacterial infection.  Flonase (fluticasone) 2 sprays in each nostril daily until symptoms resolve, then continue 1 spray in each nostril for at least 5 more days.  Take Tylenol or an NSAID such as ibuprofen or naproxen as needed for pain.  May use netti pot with bottled or distilled water and saline packets to flush sinuses.  Sudafed (pseudoephedrine) behind the pharmacist counter for 3-5 days helps relieve congestion.  Mucinex (guiafenesin) thins mucus and may help it to loosen more quickly  Saline drops or nasal sprays may loosen mucus.  Sit in the bathroom with the door closed and hot shower running to loosen mucus.  Contact primary care clinic if you do not have any relief from your symptoms after 10 days.  Present to emergency room for significantly increasing pain, persistent high fever >102F, swelling/redness around your eyes, changes in your vision or ability to move your eyes, altered mental status or a severe headache.
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
## 2019-10-22 ENCOUNTER — OFFICE VISIT (OUTPATIENT)
Dept: PODIATRY | Facility: OTHER | Age: 29
End: 2019-10-22
Payer: COMMERCIAL

## 2019-10-22 VITALS
WEIGHT: 164 LBS | HEIGHT: 71 IN | SYSTOLIC BLOOD PRESSURE: 132 MMHG | BODY MASS INDEX: 22.96 KG/M2 | TEMPERATURE: 98.3 F | DIASTOLIC BLOOD PRESSURE: 80 MMHG

## 2019-10-22 DIAGNOSIS — E10.43 TYPE 1 DIABETES MELLITUS WITH PERIPHERAL AUTONOMIC NEUROPATHY (H): ICD-10-CM

## 2019-10-22 DIAGNOSIS — M21.962 FOOT DEFORMITY, ACQUIRED, LEFT: ICD-10-CM

## 2019-10-22 DIAGNOSIS — S80.812A INFECTED ABRASION OF LEFT LOWER EXTREMITY, INITIAL ENCOUNTER: Primary | ICD-10-CM

## 2019-10-22 DIAGNOSIS — L08.9 INFECTED ABRASION OF LEFT LOWER EXTREMITY, INITIAL ENCOUNTER: Primary | ICD-10-CM

## 2019-10-22 DIAGNOSIS — Z86.14 HISTORY OF MRSA INFECTION: ICD-10-CM

## 2019-10-22 PROCEDURE — 99213 OFFICE O/P EST LOW 20 MIN: CPT | Mod: 24 | Performed by: PODIATRIST

## 2019-10-22 ASSESSMENT — MIFFLIN-ST. JEOR: SCORE: 1721.03

## 2019-10-22 ASSESSMENT — PAIN SCALES - GENERAL: PAINLEVEL: MODERATE PAIN (5)

## 2019-11-14 DIAGNOSIS — E10.43 TYPE 1 DIABETES MELLITUS WITH PERIPHERAL AUTONOMIC NEUROPATHY (H): ICD-10-CM

## 2019-11-14 NOTE — TELEPHONE ENCOUNTER
"Routing refill request to provider for review/approval because:  Labs not current:  LDL, Microalbumin, CR  T'd up 1 refill for provider review.      Requested Prescriptions   Pending Prescriptions Disp Refills     insulin aspart (NOVOLOG FLEXPEN) 100 UNIT/ML pen [Pharmacy Med Name: NOVOLOG FLEXPEN 100UNIT/ML INJ]  3     Sig: INJECT SUBQ 4 TIMES DAILY BEFORE MEALS, ACCORDING TO SLIDING SCALE AVERAGING 15 UNITS PER INJECTION PER PATIENT   Last Written Prescription Date:  7/13/2018  Last Fill Quantity: 30 ml,  # refills: 3   Last office visit: 8/30/2019    Future Office Visit:        Short Acting Insulin Protocol Failed - 11/14/2019  8:48 AM        Failed - LDL on file in past 12 months     Recent Labs   Lab Test 03/30/17  0925   LDL 92           Failed - Microalbumin on file in past 12 months     Recent Labs   Lab Test 03/30/17  0934   MICROL 6   UMALCR 27.31*           Failed - Serum creatinine on file in past 12 months     Recent Labs   Lab Test 02/26/18  1608   CR 0.77           Passed - Blood pressure less than 140/90 in past 6 months     BP Readings from Last 3 Encounters:   10/22/19 132/80   10/18/19 128/80   09/19/19 110/62           Passed - HgbA1C in past 3 or 6 months     If HgbA1C is 8 or greater, it needs to be on file within the past 3 months.  If less than 8, must be on file within the past 6 months.     Recent Labs   Lab Test 08/30/19  1510   A1C 7.5*           Passed - Medication is active on med list        Passed - Patient is age 18 or older        Passed - Recent (6 mo) or future (30 days) visit within the authorizing provider's specialty     Patient had office visit in the last 6 months or has a visit in the next 30 days with authorizing provider or within the authorizing provider's specialty.  See \"Patient Info\" tab in inbasket, or \"Choose Columns\" in Meds & Orders section of the refill encounter.          Jeanne Palmer RN    "

## 2019-11-15 DIAGNOSIS — E10.43 TYPE 1 DIABETES MELLITUS WITH PERIPHERAL AUTONOMIC NEUROPATHY (H): ICD-10-CM

## 2019-11-15 NOTE — TELEPHONE ENCOUNTER
"Insulin glargine  Last Written Prescription Date:  10/03/2019  Last Fill Quantity: 15 ml,  # refills: 1   Last office visit: 08/30/2019 with prescribing provider:     Future Office Visit:    Routing refill request to provider for review/approval because:  Labs not current:  LDL microalbumin creatinine    Requested Prescriptions   Pending Prescriptions Disp Refills     insulin glargine (BASAGLAR KWIKPEN) 100 UNIT/ML pen [Pharmacy Med Name: BASAGLAR 100UNIT    INJ] 15 mL 0     Sig: INJECT 45 UNITS SUBCUTANEOUSLY ONCE DAILY NEED  TO  MAKE  AN  APPOINTMENT       Long Acting Insulin Protocol Failed - 11/15/2019 11:26 AM        Failed - LDL on file in past 12 months     Recent Labs   Lab Test 03/30/17  0925   LDL 92             Failed - Microalbumin on file in past 12 months     Recent Labs   Lab Test 03/30/17  0934   MICROL 6   UMALCR 27.31*             Failed - Serum creatinine on file in past 12 months     Recent Labs   Lab Test 02/26/18  1608   CR 0.77             Passed - Blood pressure less than 140/90 in past 6 months     BP Readings from Last 3 Encounters:   10/22/19 132/80   10/18/19 128/80   09/19/19 110/62                 Passed - HgbA1C in past 3 or 6 months     If HgbA1C is 8 or greater, it needs to be on file within the past 3 months.  If less than 8, must be on file within the past 6 months.     Recent Labs   Lab Test 08/30/19  1510   A1C 7.5*             Passed - Medication is active on med list        Passed - Patient is age 18 or older        Passed - Recent (6 mo) or future (30 days) visit within the authorizing provider's specialty     Patient had office visit in the last 6 months or has a visit in the next 30 days with authorizing provider or within the authorizing provider's specialty.  See \"Patient Info\" tab in inbasket, or \"Choose Columns\" in Meds & Orders section of the refill encounter.              "

## 2019-11-17 RX ORDER — INSULIN GLARGINE 100 [IU]/ML
INJECTION, SOLUTION SUBCUTANEOUS
Qty: 15 ML | Refills: 0 | Status: SHIPPED | OUTPATIENT
Start: 2019-11-17 | End: 2020-02-10

## 2019-11-18 ENCOUNTER — TELEPHONE (OUTPATIENT)
Dept: PODIATRY | Facility: CLINIC | Age: 29
End: 2019-11-18

## 2019-11-18 ENCOUNTER — OFFICE VISIT (OUTPATIENT)
Dept: PODIATRY | Facility: CLINIC | Age: 29
End: 2019-11-18
Payer: COMMERCIAL

## 2019-11-18 VITALS
WEIGHT: 165 LBS | DIASTOLIC BLOOD PRESSURE: 64 MMHG | HEIGHT: 71 IN | SYSTOLIC BLOOD PRESSURE: 102 MMHG | BODY MASS INDEX: 23.1 KG/M2 | TEMPERATURE: 97.8 F

## 2019-11-18 DIAGNOSIS — E10.43 TYPE 1 DIABETES MELLITUS WITH PERIPHERAL AUTONOMIC NEUROPATHY (H): ICD-10-CM

## 2019-11-18 DIAGNOSIS — L97.321 VENOUS STASIS ULCER OF LEFT ANKLE LIMITED TO BREAKDOWN OF SKIN WITH VARICOSE VEINS (H): Primary | ICD-10-CM

## 2019-11-18 DIAGNOSIS — Z72.0 TOBACCO USE: ICD-10-CM

## 2019-11-18 DIAGNOSIS — I83.023 VENOUS STASIS ULCER OF LEFT ANKLE LIMITED TO BREAKDOWN OF SKIN WITH VARICOSE VEINS (H): Primary | ICD-10-CM

## 2019-11-18 DIAGNOSIS — Z86.14 HISTORY OF MRSA INFECTION: ICD-10-CM

## 2019-11-18 PROCEDURE — 99213 OFFICE O/P EST LOW 20 MIN: CPT | Mod: 24 | Performed by: PODIATRIST

## 2019-11-18 RX ORDER — SULFAMETHOXAZOLE/TRIMETHOPRIM 800-160 MG
1 TABLET ORAL 2 TIMES DAILY
Qty: 20 TABLET | Refills: 0 | Status: SHIPPED | OUTPATIENT
Start: 2019-11-18 | End: 2020-06-15

## 2019-11-18 RX ORDER — SULFAMETHOXAZOLE AND TRIMETHOPRIM 400; 80 MG/1; MG/1
1 TABLET ORAL 2 TIMES DAILY
Qty: 20 TABLET | Refills: 0 | Status: SHIPPED | OUTPATIENT
Start: 2019-11-18 | End: 2019-11-18

## 2019-11-18 RX ORDER — HYDROPHILIC CREAM
PASTE (GRAM) TOPICAL
Qty: 30 G | Refills: 0 | Status: SHIPPED | OUTPATIENT
Start: 2019-11-18 | End: 2022-02-01

## 2019-11-18 ASSESSMENT — MIFFLIN-ST. JEOR: SCORE: 1725.57

## 2019-11-18 ASSESSMENT — PAIN SCALES - GENERAL: PAINLEVEL: NO PAIN (0)

## 2019-11-18 NOTE — TELEPHONE ENCOUNTER
Patient is on the schedule for today now.     Radha LANCASTER, Lead RN, BSN. . .  11/18/2019, 1:20 PM

## 2019-11-18 NOTE — TELEPHONE ENCOUNTER
Attempted to contact patient, however it sounded like someone answered and then I couldn't hear anyone. Per Dr. Gates we can work him in today, have him come now.     Radha LANCASTER, Lead RN, BSN. . .  11/18/2019, 1:03 PM

## 2019-11-18 NOTE — PROGRESS NOTES
"Chief Complaint   Patient presents with     Surgical Followup     (10w3d) incision not closed w/drainage, no pain; DOS 9/6/2019 - Excision Hardware Left Fibula; LOV 10/22/2019     Diabetes     type 1     Subjective:   Patient to follow up with Primary Care provider regarding elevated blood pressure.    Patient's incision had been quite well-healed with minimal eschar however he has a new injury where he bumped his leg very hard ripping this eschar loose and creating new abrasions about the lateral left leg.  He utilized Augmentin with a secondary sinusitis and this did not seem to help much.  He is requesting another antibiotic today.  He notes overall the wound seems stable but slightly swollen and burning constantly improving very little but not worsening.    Known history of MRSA positive    DOS 9/6/2019 - Excision Hardware Left Fibula procedure.  Patient has not utilized pain     EXAM:Vitals: /64 (BP Location: Left arm, Cuff Size: Adult Regular)   Temp 97.8  F (36.6  C) (Temporal)   Ht 1.803 m (5' 11\")   Wt 74.8 kg (165 lb)   BMI 23.01 kg/m    BMI= Body mass index is 23.01 kg/m .    General appearance: Patient is alert and fully cooperative with history & exam.  No sign of distress is noted during the visit.     Psychiatric: Affect is pleasant & appropriate.  Patient appears motivated to improve health.     Respiratory: Breathing is regular & unlabored while sitting.     HEENT: Hearing is intact to spoken word.  Speech is clear.  No gross evidence of visual impairment that would impact ambulation.     Vascular: DP & PT pulses are intact & regular bilaterally.  No significant edema or varicosities noted.  CFT and skin temperature is normal to both lower extremities.     Neurologic: Lower extremity sensation is intact to light touch.  No evidence of weakness or contracture in the lower extremities.  No evidence of neuropathy.    Dermatologic: Multiple cicatrix noted about the medial lateral left leg.  " Adequate texture turgor tone slightly diminished on the left.  The recent cicatrix incision noted about the lateral distal left fibula has been abraded full-thickness abrasion across this area measuring 1 cm x 3 cm about the distal portion.  There is about 1 cm of localized erythema no purulence or malodor noted.  No palpable abscess or fluctuance noted.    Musculoskeletal: Patient is ambulatory without assistive device or brace.  Varus rotation of the distal leg.     ASSESSMENT:       ICD-10-CM    1. Venous stasis ulcer of left ankle limited to breakdown of skin with varicose veins (H) I83.023 sulfamethoxazole-trimethoprim (BACTRIM/SEPTRA) 400-80 MG tablet    L97.321 order for DME     Wound Dressings (TRIAD HYDROPHILIC WOUND DRESSI) PSTE   2. Type 1 diabetes mellitus with peripheral autonomic neuropathy (H) E10.43 sulfamethoxazole-trimethoprim (BACTRIM/SEPTRA) 400-80 MG tablet     order for DME     Wound Dressings (TRIAD HYDROPHILIC WOUND DRESSI) PSTE   3. History of MRSA infection Z86.14 sulfamethoxazole-trimethoprim (BACTRIM/SEPTRA) 400-80 MG tablet     order for DME     Wound Dressings (TRIAD HYDROPHILIC WOUND DRESSI) PSTE   4. Tobacco use Z72.0        Plan:    9/19/2019  All sutures were removed.  Bulky gauze sterile compression dressing was applied  He can begin minimal dressings now or gentle compression  1 more week of minimal activity and no soaking or submersion but can begin gentle bathing  Then no restrictions  All questions were answered he was instructed to follow-up as needed.    Pt requests left ankle leg brace, AFO to reduce risk of inversion injury to lateral ankle.  It is not clear what kind of brace would be best for him.  He has rigid deformity.  He has tibial deformity.  Any sort of AFO that would remain relatively squared to the ground or shoe and provide some stability or load to the tibia.  He has had bracing in the past and will bring this upon presenting to the orthotist.  Even a standard  AFO with minimal architecture about the plantar foot would likely protect his ankle joint some and slow progression and reduce risk of injury.    10/22/2019  Continue the oral antibiotics Augmentin for total of 14 days.  He does have a history of MRSA positive however he admits that since starting this antibiotic his leg has improved considerably.  He is very concerned for developing a deep osteomyelitis.  Recommended soap and water bathing or saline wound wash once daily but no soaking or submersion.  Continue with nonadherent dressing and gauze padding over the area with a gauze roll minimal compression as tolerated.  Activities as tolerated.  Follow-up in 2 weeks.  If he calls with this worsening would recommend switching antibiotic to Bactrim due to history of MRSA.    All questions were answered.  Reviewed risks and alternative treatment options.    11/18/2019  Begin triad to the wound very sparingly topically over the next month or so  Instruction to begin Bactrim.  He feels he had a rash from straight sulfa when he was 1-year-old.  He does not feel he is allergic to Bactrim.  Discussed risk that he may have an allergy to this and that there is significant associated risk.  If he develops any significant symptoms recommended he follow-up in the emergency department.  Order to begin compression therapy as well due to secondary chronic lower extremity edema.  Follow-up in the next month if this remains symptomatic.  Also recommended he discontinue smoking.      Hernando Gates DPM

## 2019-11-18 NOTE — PATIENT INSTRUCTIONS
SMOKING CESSATION  What's in cigarette smoke? - Cigarette smoke contains over 4,000 chemicals. Nicotine is one of the main ingredients which is an insecticide/herbicide. It is poisonous to our nervous system, increases blood clotting risk, and decreases the body's defenses to fight off infection. Another chemical is Carbon Monoxide is an asphyxiating gas that permanently binds to blood cells and blocks the transport of oxygen. This leads to tissue death and decreases your metabolism. Tar is a chemical that coats your lungs and trachea which impairs new oxygen coming in and carbon dioxide getting out of your body.   How does smoking impact surgery? - Smoking is particularly hazardous with regards to surgery. Surgery puts stress on the body and a smoker's body is already under strain from these chemicals. Putting the two together, especially for an elective surgery, could be a recipe for disaster. Smoking before and after surgery increases your risk of heart problems, slow wound healing, delayed bone healing, blood clots, wound infection and anesthesia complications.   What are the benefits of quitting? - In 20 minutes your blood pressure will drop back down to normal. In 8 hours the carbon monoxide (a toxic gas) levels in your blood stream will drop by half, and oxygen levels will return to normal. In 48 hours your chance of having a heart attack will have decreased. All nicotine will have left your body. Your sense of taste and smell will return to a normal level. In 72 hours your bronchial tubes will relax, and your energy levels will increase. In 2 weeks your circulation will increase, and it will continue to improve for the next 10 weeks.    Recommendations for elective surgery - Ideally, patients should quit smoking 8 weeks before and at least 2 weeks after elective surgery in order to avoid complications. Simply cutting back on the amount of cigarettes smoked per day does not offer any benefit or decrease the  risk of poor wound healing, heart problems, and infection. Smokers should also start taking Vitamin C and B for two weeks before surgery and two weeks after surgery.    Ways to Stop Smokin. Nicotine patches, lozenges, or gum  2. Support Groups  3. Medications (see below)    List of Medications:  1. Varenicline Tartrate (CHANTIX)   2. Bupropion HCL (WELLBUTRIN, ZYBAN) - note: make sure Wellbutrin is for smoking cessation and not other issues   3. Nicotine Patch (NICODERM)   4. Nicotine Inhaler (NICOTROL)   5. Nicotine Gum Nicotine Polacrilex   6. Nicotine Lozenge: Nicotine Polacrilex (COMMIT)   * [place] offers a smoking support group as well!  Please visit: [email or web address]  If you are interested in these, ask about getting a prescription or talk to your primary care doctor about what may be the best way for you to quit.

## 2019-11-18 NOTE — TELEPHONE ENCOUNTER
Reason for Call:  Other call back    Detailed comments: work in today for wound care of left ankle , pt stated he is pretty concerned about the wound. Please call today to advise. Thank You Emily      Phone Number Patient can be reached at: Cell number on file:    Telephone Information:   Mobile [phone]       Best Time: any time today he can come to [place]    Can we leave a detailed message on this number? YES    Call taken on 11/18/2019 at 11:53 AM by Emily Ramyond

## 2019-11-18 NOTE — LETTER
[address] [address]  [phone]    2019      RE:  Rafat Baird  : 1990      To whom it may concern:    This patient had an appointment today.      Sincerely,          Hernando Gates DPM

## 2019-11-22 DIAGNOSIS — E10.43 TYPE 1 DIABETES MELLITUS WITH PERIPHERAL AUTONOMIC NEUROPATHY (H): ICD-10-CM

## 2019-11-22 NOTE — TELEPHONE ENCOUNTER
"Requested Prescriptions   Pending Prescriptions Disp Refills     insulin aspart (NOVOLOG FLEXPEN) 100 UNIT/ML pen 3 mL 0     Sig: INJECT SUBQ 4 TIMES DAILY BEFORE MEALS, ACCORDING TO SLIDING SCALE AVERAGING 15 UNITS PER INJECTION PER PATIENT   Last Written Prescription Date:  11/15/19  Last Fill Quantity: 3 ml,  # refills: 0   Last office visit: 8/30/19 [place]   Future Office Visit:        Short Acting Insulin Protocol Failed - 11/22/2019  7:23 AM        Failed - LDL on file in past 12 months     Recent Labs   Lab Test 03/30/17  0925   LDL 92           Failed - Microalbumin on file in past 12 months     Recent Labs   Lab Test 03/30/17  0934   MICROL 6   UMALCR 27.31*           Failed - Serum creatinine on file in past 12 months     Recent Labs   Lab Test 02/26/18  1608   CR 0.77           Passed - Blood pressure less than 140/90 in past 6 months     BP Readings from Last 3 Encounters:   11/18/19 102/64   10/22/19 132/80   10/18/19 128/80           Passed - HgbA1C in past 3 or 6 months     If HgbA1C is 8 or greater, it needs to be on file within the past 3 months.  If less than 8, must be on file within the past 6 months.     Recent Labs   Lab Test 08/30/19  1510   A1C 7.5*           Passed - Medication is active on med list        Passed - Patient is age 18 or older        Passed - Recent (6 mo) or future (30 days) visit within the authorizing provider's specialty     Patient had office visit in the last 6 months or has a visit in the next 30 days with authorizing provider or within the authorizing provider's specialty.  See \"Patient Info\" tab in inbasket, or \"Choose Columns\" in Meds & Orders section of the refill encounter.              "

## 2019-11-22 NOTE — TELEPHONE ENCOUNTER
Routing refill request to provider for review/approval because:  Labs not current:  LDL, CR, Microalbumin  Per note below patient would like 90 day supply due to loosing insurance at end of this year.    Lauren Walton RN on 11/22/2019 at 11:32 AM

## 2019-12-16 ENCOUNTER — TELEPHONE (OUTPATIENT)
Dept: FAMILY MEDICINE | Facility: CLINIC | Age: 29
End: 2019-12-16

## 2019-12-16 NOTE — TELEPHONE ENCOUNTER
Form has been faxed. Copy has been placed in the patient's chart.  Emilia De La Fuente on 12/16/2019 at 4:48 PM

## 2019-12-16 NOTE — TELEPHONE ENCOUNTER
Rafat left a form for Dr. Sood to fill out. It is the [place] [employer] insulin treated report. It can be faxed to [phone]. He said there is no rush, it just has to be done before the start of February.

## 2020-02-07 DIAGNOSIS — E10.43 TYPE 1 DIABETES MELLITUS WITH PERIPHERAL AUTONOMIC NEUROPATHY (H): ICD-10-CM

## 2020-02-10 RX ORDER — INSULIN GLARGINE 100 [IU]/ML
INJECTION, SOLUTION SUBCUTANEOUS
Qty: 15 ML | Refills: 0 | Status: SHIPPED | OUTPATIENT
Start: 2020-02-10 | End: 2020-03-25

## 2020-02-10 NOTE — TELEPHONE ENCOUNTER
"Routing refill request to provider for review/approval because:  Labs not current:  LDL, Microalbumin, CR  T'd up 1 month for provider review.    Requested Prescriptions   Pending Prescriptions Disp Refills     insulin glargine (BASAGLAR KWIKPEN) 100 UNIT/ML pen [Pharmacy Med Name: Basaglar KwikPen 100 UNIT/ML Subcutaneous Solution Pen-injector] 15 mL 0     Sig: INJECT 45 UNITS SUBCUTANEOUSLY ONCE DAILY NEED  TO  MAKE  AN  APPOINTMENT   Last Written Prescription Date:  11/17/2019  Last Fill Quantity: 15 ml,  # refills: 0   Last office visit: 8/30/2019 with prescribing provider:     Future Office Visit:        Long Acting Insulin Protocol Failed - 2/7/2020  6:54 PM        Failed - LDL on file in past 12 months     Recent Labs   Lab Test 03/30/17  0925   LDL 92           Failed - Microalbumin on file in past 12 months     Recent Labs   Lab Test 03/30/17  0934   MICROL 6   UMALCR 27.31*           Failed - Serum creatinine on file in past 12 months     Recent Labs   Lab Test 02/26/18  1608   CR 0.77           Passed - Blood pressure less than 140/90 in past 6 months     BP Readings from Last 3 Encounters:   11/18/19 102/64   10/22/19 132/80   10/18/19 128/80           Passed - HgbA1C in past 3 or 6 months     If HgbA1C is 8 or greater, it needs to be on file within the past 3 months.  If less than 8, must be on file within the past 6 months.     Recent Labs   Lab Test 08/30/19  1510   A1C 7.5*           Passed - Medication is active on med list        Passed - Patient is age 18 or older        Passed - Recent (6 mo) or future (30 days) visit within the authorizing provider's specialty     Patient had office visit in the last 6 months or has a visit in the next 30 days with authorizing provider or within the authorizing provider's specialty.  See \"Patient Info\" tab in inbasket, or \"Choose Columns\" in Meds & Orders section of the refill encounter.          Jeanne Palmer RN      "

## 2020-02-11 DIAGNOSIS — E10.43 TYPE 1 DIABETES MELLITUS WITH PERIPHERAL AUTONOMIC NEUROPATHY (H): ICD-10-CM

## 2020-02-12 NOTE — TELEPHONE ENCOUNTER
"novolog  Last Written Prescription Date:  11/22/2019  Last Fill Quantity: 60 ml,  # refills: 0   Last office visit: 9/12/2019 with prescribing provider:      Future Office Visit:      Requested Prescriptions   Pending Prescriptions Disp Refills     insulin aspart (NOVOLOG FLEXPEN) 100 UNIT/ML pen [Pharmacy Med Name: NovoLOG FlexPen 100 UNIT/ML Subcutaneous Solution Pen-injector] 60 mL 0     Sig: INJECT  UNITS SUBCUTANEOUSLY 4 TIMES DAILY BEFORE MEAL(S) ACCORDING  TO  SLIDING  SCALE  AVERAGING  15  UNITS  PER  INJECTION  PER  PATIENT       Short Acting Insulin Protocol Failed - 2/11/2020  7:53 PM        Failed - LDL on file in past 12 months     Recent Labs   Lab Test 03/30/17  0925   LDL 92             Failed - Microalbumin on file in past 12 months     Recent Labs   Lab Test 03/30/17  0934   MICROL 6   UMALCR 27.31*             Failed - Serum creatinine on file in past 12 months     Recent Labs   Lab Test 02/26/18  1608   CR 0.77             Passed - Blood pressure less than 140/90 in past 6 months     BP Readings from Last 3 Encounters:   11/18/19 102/64   10/22/19 132/80   10/18/19 128/80                 Passed - HgbA1C in past 3 or 6 months     If HgbA1C is 8 or greater, it needs to be on file within the past 3 months.  If less than 8, must be on file within the past 6 months.     Recent Labs   Lab Test 08/30/19  1510   A1C 7.5*             Passed - Medication is active on med list        Passed - Patient is age 18 or older        Passed - Recent (6 mo) or future (30 days) visit within the authorizing provider's specialty     Patient had office visit in the last 6 months or has a visit in the next 30 days with authorizing provider or within the authorizing provider's specialty.  See \"Patient Info\" tab in inbasket, or \"Choose Columns\" in Meds & Orders section of the refill encounter.              Prescription approved per [facility] Refill Protocol.      Lisa Sood RN on 2/12/2020 at 12:48 PM    "

## 2020-03-24 DIAGNOSIS — E10.43 TYPE 1 DIABETES MELLITUS WITH PERIPHERAL AUTONOMIC NEUROPATHY (H): ICD-10-CM

## 2020-03-25 RX ORDER — INSULIN GLARGINE 100 [IU]/ML
INJECTION, SOLUTION SUBCUTANEOUS
Qty: 15 ML | Refills: 0 | Status: SHIPPED | OUTPATIENT
Start: 2020-03-25 | End: 2020-05-11

## 2020-03-25 NOTE — TELEPHONE ENCOUNTER
Routing refill request to provider for review/approval because:  Labs not current:  LDL, Cr, Microalbumin, A1c  KELLY MendezN, RN

## 2020-03-25 NOTE — TELEPHONE ENCOUNTER
"Requested Prescriptions   Pending Prescriptions Disp Refills     insulin glargine (BASAGLAR KWIKPEN) 100 UNIT/ML pen [Pharmacy Med Name: Basaglar KwikPen 100 UNIT/ML Subcutaneous Solution Pen-injector] 15 mL 0     Sig: INJECT 45 UNITS SUBCUTANEOUSLY ONCE DAILY NEED  TO  MAKE  AN  APPOINTMENT   Last Written Prescription Date:  2/10/2020  Last Fill Quantity: 15 mL,  # refills: 0  Last office visit: 9/12/2019 with prescribing provider:  8/30/19   Future Office Visit:        Long Acting Insulin Protocol Failed - 3/24/2020  4:33 PM        Failed - LDL on file in past 12 months     Recent Labs   Lab Test 03/30/17  0925   LDL 92             Failed - Microalbumin on file in past 12 months     Recent Labs   Lab Test 03/30/17  0934   MICROL 6   UMALCR 27.31*             Failed - Serum creatinine on file in past 12 months     Recent Labs   Lab Test 02/26/18  1608   CR 0.77       Ok to refill medication if creatinine is low          Failed - HgbA1C in past 3 or 6 months     If HgbA1C is 8 or greater, it needs to be on file within the past 3 months.  If less than 8, must be on file within the past 6 months.     Recent Labs   Lab Test 08/30/19  1510   A1C 7.5*             Failed - Recent (6 mo) or future (30 days) visit within the authorizing provider's specialty     Patient had office visit in the last 6 months or has a visit in the next 30 days with authorizing provider or within the authorizing provider's specialty.  See \"Patient Info\" tab in inbasket, or \"Choose Columns\" in Meds & Orders section of the refill encounter.            Passed - Blood pressure less than 140/90 in past 6 months     BP Readings from Last 3 Encounters:   11/18/19 102/64   10/22/19 132/80   10/18/19 128/80                 Passed - Medication is active on med list        Passed - Patient is age 18 or older             "

## 2020-05-08 ENCOUNTER — TELEPHONE (OUTPATIENT)
Dept: FAMILY MEDICINE | Facility: CLINIC | Age: 30
End: 2020-05-08

## 2020-05-08 DIAGNOSIS — E10.43 TYPE 1 DIABETES MELLITUS WITH PERIPHERAL AUTONOMIC NEUROPATHY (H): ICD-10-CM

## 2020-05-11 ENCOUNTER — TELEPHONE (OUTPATIENT)
Dept: FAMILY MEDICINE | Facility: CLINIC | Age: 30
End: 2020-05-11

## 2020-05-11 DIAGNOSIS — E10.43 TYPE 1 DIABETES MELLITUS WITH PERIPHERAL AUTONOMIC NEUROPATHY (H): Primary | ICD-10-CM

## 2020-05-11 RX ORDER — INSULIN GLARGINE 100 [IU]/ML
INJECTION, SOLUTION SUBCUTANEOUS
Qty: 15 ML | Refills: 0 | Status: SHIPPED | OUTPATIENT
Start: 2020-05-11 | End: 2020-06-17

## 2020-05-11 RX ORDER — INSULIN ASPART 100 [IU]/ML
INJECTION, SOLUTION INTRAVENOUS; SUBCUTANEOUS
Qty: 60 ML | Refills: 0 | Status: CANCELLED | OUTPATIENT
Start: 2020-05-11

## 2020-05-11 NOTE — TELEPHONE ENCOUNTER
Left a message for call back. Patient did not follow up as advised, and is due for a phone visit with PCP. If patient calls back please schedule phone visit.     Myriam Narayanan CMA ([facility]) 5/11/2020

## 2020-05-11 NOTE — TELEPHONE ENCOUNTER
"BASAGLAR Kwik Pen  Last Written Prescription Date:  3/25/20  Last Fill Quantity: 15 ml,  # refills: 0   ** pt was to schedule appt before refill .   Last office visit: 8/30/2019 with prescribing provider:  Dr. Sood   Future Office Visit:  NONE  Requested Prescriptions   Pending Prescriptions Disp Refills     insulin glargine (BASAGLAR KWIKPEN) 100 UNIT/ML pen [Pharmacy Med Name: Basaglar KwikPen 100 UNIT/ML Subcutaneous Solution Pen-injector] 15 mL 0     Sig: INJECT 45 UNITS SUBCUTANEOUSLY ONCE DAILY NEED  TO  MAKE  APPOINTMENT       Long Acting Insulin Protocol Failed - 5/8/2020  6:41 AM        Failed - Serum creatinine on file in past 12 months     Recent Labs   Lab Test 02/26/18  1608   CR 0.77   Ok to refill medication if creatinine is low          Failed - HgbA1C in past 3 or 6 months     If HgbA1C is 8 or greater, it needs to be on file within the past 3 months.  If less than 8, must be on file within the past 6 months.     Recent Labs   Lab Test 08/30/19  1510   A1C 7.5*           Failed - Recent (6 mo) or future (30 days) visit within the authorizing provider's specialty     Patient had office visit in the last 6 months or has a visit in the next 30 days with authorizing provider or within the authorizing provider's specialty.  See \"Patient Info\" tab in inbasket, or \"Choose Columns\" in Meds & Orders section of the refill encounter.         Passed - Medication is active on med list        Passed - Patient is age 18 or older         Routing refill request to provider for review/approval because:  Coral given x1 and patient did not follow up, please advise  Labs out of range:  See above  Labs not current:  See above    Forwarding to [facility] to schedule schedule kapil with waylon Sood when he is back in office for phone or clinic visit. Due for labs, too.   RADHA Manriquez                    "

## 2020-05-12 NOTE — TELEPHONE ENCOUNTER
Patient refused to schedule a telephone visit, and states he and his provider know what's best for his care. He stated that his PCP doesn't make him come in over year to monitor his medication. He wants a message sent to Dr. Sood to confirm that he wants him to schedule a phone visit.     Routing to PCP to advise on.    Myriam Narayanan CMA ([facility]) 5/12/2020

## 2020-05-13 NOTE — TELEPHONE ENCOUNTER
Routing to team to set up virtual appointment for patient for diabetic follow up.  Please route to PCP/covering provider when appointment has been scheduled for completion of refill.    KELLY HackettN, RN  [facility]

## 2020-05-13 NOTE — TELEPHONE ENCOUNTER
Left a message again.  He has to have a video visit in order to get more of a quanity.  Dr. Sood is out for 2 weeks and another provider won't fill more then that.

## 2020-05-19 DIAGNOSIS — E10.43 TYPE 1 DIABETES MELLITUS WITH PERIPHERAL AUTONOMIC NEUROPATHY (H): ICD-10-CM

## 2020-05-20 NOTE — TELEPHONE ENCOUNTER
Routing to team to set up phone appointment for patient for diabetes.  Please route to provider when appointment has been scheduled for completion of refill.  Routing refill request to provider for review/approval because:  Labs not current:  Cr, A1c  Last Written Prescription Date:  2/12/2020  Last Fill Quantity: 60ml,  # refills: 0   Last office visit: 8/30/2019 with prescribing provider:     Future Office Visit:      AUNDREA Mendez, RN

## 2020-05-21 RX ORDER — INSULIN ASPART 100 [IU]/ML
INJECTION, SOLUTION INTRAVENOUS; SUBCUTANEOUS
Qty: 60 ML | Refills: 0 | Status: SHIPPED | OUTPATIENT
Start: 2020-05-21 | End: 2020-07-02

## 2020-05-21 NOTE — TELEPHONE ENCOUNTER
Still unable to reach patient but I will let the pharmacy know.  Can you still fill this for him.

## 2020-06-15 ENCOUNTER — OFFICE VISIT (OUTPATIENT)
Dept: ORTHOPEDICS | Facility: CLINIC | Age: 30
End: 2020-06-15
Payer: COMMERCIAL

## 2020-06-15 VITALS
SYSTOLIC BLOOD PRESSURE: 118 MMHG | WEIGHT: 160 LBS | BODY MASS INDEX: 22.32 KG/M2 | DIASTOLIC BLOOD PRESSURE: 65 MMHG | HEART RATE: 93 BPM

## 2020-06-15 DIAGNOSIS — M16.51 POST-TRAUMATIC OSTEOARTHRITIS OF RIGHT HIP: ICD-10-CM

## 2020-06-15 DIAGNOSIS — M70.61 GREATER TROCHANTERIC BURSITIS OF RIGHT HIP: Primary | ICD-10-CM

## 2020-06-15 PROCEDURE — 99203 OFFICE O/P NEW LOW 30 MIN: CPT | Mod: 25 | Performed by: PHYSICAL MEDICINE & REHABILITATION

## 2020-06-15 PROCEDURE — 20610 DRAIN/INJ JOINT/BURSA W/O US: CPT | Mod: RT | Performed by: PHYSICAL MEDICINE & REHABILITATION

## 2020-06-15 RX ORDER — TRIAMCINOLONE ACETONIDE 40 MG/ML
40 INJECTION, SUSPENSION INTRA-ARTICULAR; INTRAMUSCULAR
Status: SHIPPED | OUTPATIENT
Start: 2020-06-15

## 2020-06-15 RX ADMIN — TRIAMCINOLONE ACETONIDE 40 MG: 40 INJECTION, SUSPENSION INTRA-ARTICULAR; INTRAMUSCULAR at 09:22
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
## 2020-06-15 NOTE — LETTER
[place] 15, 2020      Rafat Baird  [address]   [place] [place] [address]        To Whom It May Concern:    Rafat Baird was seen on 6/15/2020 for right hip pain.  He should not work today or tomorrow 6/16/2020.  He can return to work on 6/17/2020 without restrictions.      Sincerely,        Nohemy Soares MD

## 2020-06-15 NOTE — PATIENT INSTRUCTIONS
-Steroid injection performed today.  Take it easy over the next few days. Keep in mind that the steroid may take up to 3 days to start working and up to 2 weeks to reach maximal effect.  Ice 15-20 minutes as needed for soreness.  Patient's preferred over the counter medication as needed for pain as directed on packaging.  -Physical therapy ordered.  Please do 5-6 days of exercises per week between formal sessions and the home exercises they provide.  -Work: No work today or tomorrow.  Cleared for work beginning 6/17/2020.    -Follow up as needed if symptoms fail to improve or worsen.  Please call with questions or concerns.

## 2020-06-16 DIAGNOSIS — E10.43 TYPE 1 DIABETES MELLITUS WITH PERIPHERAL AUTONOMIC NEUROPATHY (H): ICD-10-CM

## 2020-06-16 DIAGNOSIS — E10.43 TYPE 1 DIABETES MELLITUS WITH PERIPHERAL AUTONOMIC NEUROPATHY (H): Primary | ICD-10-CM

## 2020-06-16 NOTE — LETTER
[address] [address]  [phone]        June 22, 2020    Rafat Baird  [id number]    [facility] [address]          Dear Rafat,    We have attempted to reach you by phone in regards to your care. Please contact the clinic and go over medication with us in regards to your insulin.     Sincerely,        Jose Sood MD  Care Team

## 2020-06-16 NOTE — LETTER
[address] [address]  [phone]        June 18, 2020    Rafat Baird  [id number]    [facility] [address]          Dear Austin Douglass, at [place] want to help you receive better care.  To improve the process of getting refills please schedule an appointment with your primary provider before your next refill. You can call the clinic at [phone] to schedule an appointment.      Sincerely,        Jose Sood MD

## 2020-06-17 RX ORDER — INSULIN GLARGINE 100 [IU]/ML
INJECTION, SOLUTION SUBCUTANEOUS
Qty: 15 ML | Refills: 0 | Status: SHIPPED | OUTPATIENT
Start: 2020-06-17 | End: 2020-08-07

## 2020-06-17 NOTE — TELEPHONE ENCOUNTER
Routing refill request to provider for review/approval because:  Labs not current:  Creatinine, A1C    AUNDREA Hackett, RN  [facility]

## 2020-06-17 NOTE — TELEPHONE ENCOUNTER
Routing to team to set up virtual or phone  follow up appointment for patient for diabetes follow up.  Please also ask patient how much medication she has left and schedule appropriately.  If patient needs a refill before their appointment, please route to Rn for completion of refill.

## 2020-06-18 NOTE — TELEPHONE ENCOUNTER
Left message to call back.  In the bottom of the message it says humalog but novolog is in the refill request.  Ask him which one he takes or is this because of a insurance issue.  Please route to Dr. Sood when it is the correct medication for him to fill.

## 2020-06-22 NOTE — TELEPHONE ENCOUNTER
4th attempt unable to reach, letter sent to contact clinic in regards to his diabetic medication. Routing to provider.     Geno Lee MA

## 2020-07-02 RX ORDER — INSULIN ASPART 100 [IU]/ML
INJECTION, SOLUTION INTRAVENOUS; SUBCUTANEOUS
Qty: 60 ML | Refills: 0 | Status: SHIPPED | OUTPATIENT
Start: 2020-07-02 | End: 2020-10-01

## 2020-07-29 ENCOUNTER — VIRTUAL VISIT (OUTPATIENT)
Dept: FAMILY MEDICINE | Facility: CLINIC | Age: 30
End: 2020-07-29
Payer: COMMERCIAL

## 2020-07-29 DIAGNOSIS — Z13.6 CARDIOVASCULAR SCREENING; LDL GOAL LESS THAN 100: ICD-10-CM

## 2020-07-29 DIAGNOSIS — E10.43 TYPE 1 DIABETES MELLITUS WITH PERIPHERAL AUTONOMIC NEUROPATHY (H): Primary | ICD-10-CM

## 2020-07-29 PROCEDURE — 99214 OFFICE O/P EST MOD 30 MIN: CPT | Mod: 95 | Performed by: FAMILY MEDICINE

## 2020-07-29 RX ORDER — LISINOPRIL 5 MG/1
5 TABLET ORAL DAILY
Qty: 90 TABLET | Refills: 1 | Status: SHIPPED | OUTPATIENT
Start: 2020-07-29 | End: 2022-02-01

## 2020-07-29 RX ORDER — INSULIN LISPRO 100 [IU]/ML
INJECTION, SOLUTION INTRAVENOUS; SUBCUTANEOUS
Qty: 75 ML | Refills: 3 | Status: SHIPPED | OUTPATIENT
Start: 2020-07-29 | End: 2020-10-01

## 2020-07-29 NOTE — PROGRESS NOTES
"Rafat Baird is a 30 year old male who is being evaluated via a billable telephone visit.      The patient has been notified of following:     \"This telephone visit will be conducted via a call between you and your physician/provider. We have found that certain health care needs can be provided without the need for a physical exam.  This service lets us provide the care you need with a short phone conversation.  If a prescription is necessary we can send it directly to your pharmacy.  If lab work is needed we can place an order for that and you can then stop by our lab to have the test done at a later time.    Telephone visits are billed at different rates depending on your insurance coverage. During this emergency period, for some insurers they may be billed the same as an in-person visit.  Please reach out to your insurance provider with any questions.    If during the course of the call the physician/provider feels a telephone visit is not appropriate, you will not be charged for this service.\"    Patient has given verbal consent for Telephone visit?  Yes    What phone number would you like to be contacted at? [phone]    How would you like to obtain your AVS?     Subjective     Rafat Baird is a 30 year old male who presents via phone visit today for the following health issues:    HPI    Diabetes Follow-up    How often are you checking your blood sugar? Four or more times daily  Blood sugar testing frequency justification:  Uncontrolled diabetes  What time of day are you checking your blood sugars (select all that apply)?  Before and after meals  Have you had any blood sugars above 200?  No  Have you had any blood sugars below 70?  Yes few and far between.  Usually happens after he has worked out    What symptoms do you notice when your blood sugar is low?  Sweaty, jittery    What concerns do you have today about your diabetes? None     Do you have any of these symptoms? (Select all that apply)  No numbness " or tingling in feet.  No redness, sores or blisters on feet.  No complaints of excessive thirst.  No reports of blurry vision.  No significant changes to weight.      BP Readings from Last 2 Encounters:   06/15/20 118/65   11/18/19 102/64     Hemoglobin A1C (%)   Date Value   08/30/2019 7.5 (H)   11/03/2017 8.5 (H)     LDL Cholesterol Calculated (mg/dL)   Date Value   03/30/2017 92   10/12/2015 82           How many servings of fruits and vegetables do you eat daily?  2-3    On average, how many sweetened beverages do you drink each day (Examples: soda, juice, sweet tea, etc.  Do NOT count diet or artificially sweetened beverages)?   1    How many days per week do you exercise enough to make your heart beat faster? 7    How many minutes a day do you exercise enough to make your heart beat faster? 30 - 60    How many days per week do you miss taking your medication? 0    Patient states the major reason this phone call was initiated he has insurance change from NovoLog to Humalog.  He requires a new prescription.  In that process it was noted that he has not had diabetes follow-up for some time.  He physically is feeling well with no other symptoms.  He understands diabetes and how to use insulin.    Patient Active Problem List   Diagnosis     Celiac disease     Tibia upper end fracture     CARDIOVASCULAR SCREENING; LDL GOAL LESS THAN 100     Hypopotassemia     Closed fracture of shaft of humerus     Closed head injury     Nausea without vomiting     Pulmonary embolism and infarction (H)     Adjustment reaction with anxiety and depression     Decubitus ulcer     Other specified idiopathic peripheral neuropathy     Major depressive disorder, recurrent episode, moderate (H)     Hip fracture, right (H)     Cellulitis of leg     DVT prophylaxis     Infected hardware in right leg (H)     Tobacco use disorder     Cellulitis     Foot deformity, acquired, left     Type 1 diabetes mellitus with peripheral autonomic neuropathy  (H)     Laceration of foot, left, complicated, initial encounter     Open fracture of bone of foot affected by diabetic neuropathy, left, with routine healing, subsequent encounter (H)     Closed fracture of distal end of left fibula and tibia with routine healing, subsequent encounter     Past Surgical History:   Procedure Laterality Date     C OPEN TX TIBIAL FRACTURE PROXIMAL UNICONDYLAR  06/04/09    open reduction internal fixation,left proximal tibia     ELBOW SURGERY  6/11     HC REPAIR ING HERNIA,5+Y/O,REDUCIBL  1997     IRRIGATION AND DEBRIDEMENT LOWER EXTREMITY, COMBINED  9/4/2013    Procedure: COMBINED IRRIGATION AND DEBRIDEMENT LOWER EXTREMITY;;  Surgeon: Ramiro Starkey MD;  [facility]     OPEN REDUCTION INTERNAL FIXATION ACETABULUM  6/11     OPEN REDUCTION INTERNAL FIXATION FIBULA Left 2/28/2018    Procedure: OPEN REDUCTION INTERNAL FIXATION FIBULA;;  Surgeon: Raimro Starkey MD;  [facility]     OPEN REDUCTION INTERNAL FIXATION HUMERUS PROXIMAL  12/10     OPEN REDUCTION INTERNAL FIXATION RODDING INTRAMEDULLARY FEMUR  6/11     OPEN REDUCTION INTERNAL FIXATION TIBIA Left 2/28/2018    Procedure: OPEN REDUCTION INTERNAL FIXATION TIBIA;  OPEN REDUCTION INTERNAL FIXATION LEFT DISTAL TIBIA AND FIBULA;  Surgeon: Ramiro Starkey MD;  [facility]     REMOVE HARDWARE LOWER EXTREMITY  9/4/2013    Procedure: REMOVE HARDWARE LOWER EXTREMITY;  removal of  hardware left proximal tibia, irrigation and debridement of left lower extremity wound, and arthrocentesis  and lavage of left knee joint.;  Surgeon: Ramiro Starkey MD;  [facility]     REMOVE HARDWARE LOWER EXTREMITY Left 9/6/2019    Procedure: Excision Hardware Left Fibula;  Surgeon: Hernando Gates DPM;  Location: [facility]       Social History     Tobacco Use     Smoking status: Current Every Day Smoker     Packs/day: 0.50     Years: 1.00     Pack years: 0.50     Smokeless tobacco: Never Used   Substance Use Topics      Alcohol use: No     Family History   Problem Relation Age of Onset     Diabetes Other          Current Outpatient Medications   Medication Sig Dispense Refill     IBUPROFEN PO Take 600 mg by mouth       insulin aspart (NOVOLOG FLEXPEN) 100 UNIT/ML pen INJECT  UNITS SUBCUTANEOUSLY 4 TIMES DAILY BEFORE MEAL(S) ACCORDING  TO  SLIDING  SCALE  AVERAGING  15  UNITS  PER  INJECTION  PER  PATIENT 60 mL 0     insulin glargine (BASAGLAR KWIKPEN) 100 UNIT/ML pen INJECT 45 UNITS SUBCUTANEOUSLY ONCE DAILY . APPOINTMENT REQUIRED FOR FUTURE REFILLS 15 mL 0     insulin lispro (HUMALOG KWIKPEN) 100 UNIT/ML (1 unit dial) KWIKPEN INJECT  UNITS SUBCUTANEOUSLY 4 TIMES DAILY BEFORE MEAL(S) ACCORDING  TO  SLIDING  SCALE  AVERAGING  15  UNITS  PER  INJECTION  PER  PATIENT 75 mL 3     lisinopril (ZESTRIL) 5 MG tablet Take 1 tablet (5 mg) by mouth daily 90 tablet 1     ACE/ARB NOT PRESCRIBED, INTENTIONAL, by Other route continuous prn.       ASPIRIN NOT PRESCRIBED, INTENTIONAL, by Other route continuous prn Reported on 3/2/2017  0     Glucose Blood (ACCU-CHEK ROBSON) STRP Use to test blood sugars 6-7 times daily. 100 strip prn     insulin pen needle 31G X 5 MM 1 Box See Admin Instructions Use 4 time(s) a day. 100 each 6     order for DME One pair compression garment 20-30 mmHg may substitute per fitter.  Please call [facility] at [phone] for appt. 1 Units 0     STATIN NOT PRESCRIBED, INTENTIONAL, Reported on 3/2/2017 0 each 0     Wound Dressings (TRIAD HYDROPHILIC WOUND DRESSI) PSTE Apply a small amount to the wound or dressing then apply dressing/covering to keep in place and absorb moisture. 30 g 0     Allergies   Allergen Reactions     Vicodin [Hydrocodone-Acetaminophen] Nausea and Vomiting     Sick to stomach     Wheat Extract Diarrhea     no wheat barley rygh or oats     Sulfa Drugs Rash     Recent Labs   Lab Test 08/30/19  1510 02/26/18  1608 11/03/17  0849 03/30/17  0925  10/12/15  1458  08/04/14  1510  01/31/13  0832   A1C  7.5*  --  8.5* 8.6*  --  8.4*  --  8.0*   < > 7.3*   LDL  --   --   --  92  --  82  --  96  --  86   HDL  --   --   --  37*  --  37*  --   --   --  42   TRIG  --   --   --  77  --  97  --   --   --  108   ALT  --   --  33  --   --   --   --   --   --  31   CR  --  0.77 0.76  --    < >  --    < >  --    < > 0.76   GFRESTIMATED  --  >90 >90  --    < >  --    < >  --    < > >90   GFRESTBLACK  --  >90 >90  --    < >  --    < >  --    < > >90   POTASSIUM  --  4.1 4.2  --    < >  --    < >  --    < > 3.8   TSH  --   --  1.04  --   --  1.12  --   --   --  0.48    < > = values in this interval not displayed.      BP Readings from Last 3 Encounters:   06/15/20 118/65   11/18/19 102/64   10/22/19 132/80    Wt Readings from Last 3 Encounters:   06/15/20 72.6 kg (160 lb)   11/18/19 74.8 kg (165 lb)   10/22/19 74.4 kg (164 lb)                    Reviewed and updated as needed this visit by Provider         Review of Systems   Constitutional, HEENT, cardiovascular, pulmonary, gi and gu systems are negative, except as otherwise noted.       Objective   Reported vitals:  There were no vitals taken for this visit.   healthy, alert and no distress  PSYCH: Alert and oriented times 3; coherent speech, normal   rate and volume, able to articulate logical thoughts, able   to abstract reason, no tangential thoughts, no hallucinations   or delusions  His affect is normal  RESP: No cough, no audible wheezing, able to talk in full sentences  Remainder of exam unable to be completed due to telephone visits    Diagnostic Test Results:  Labs reviewed in Epic        Assessment/Plan:    1. Type 1 diabetes mellitus with peripheral autonomic neuropathy (H)  The above is his major concern for health.  At this time I switched insulin from NovoLog to Humalog exact same dosing.  He states this is working for him.  He will also call and schedule laboratory appointment and we can address further as needed from there.    I discussed initiating lisinopril  and statin with him as a diabetic.  He is comfortable starting 1 medication first.  We will initiate lisinopril low-dose 5 mg.  We talked about possible lightheadedness and cough.  Adjustment may be needed based on how he tolerates this.  Going forward statins may be appropriate as well.  We discussed how these 2 medications could benefit him.  He would prefer to just start one at a time.  - **Comprehensive metabolic panel FUTURE anytime; Future  - **CBC with platelets FUTURE anytime; Future  - **A1C FUTURE anytime; Future  - insulin lispro (HUMALOG KWIKPEN) 100 UNIT/ML (1 unit dial) KWIKPEN; INJECT  UNITS SUBCUTANEOUSLY 4 TIMES DAILY BEFORE MEAL(S) ACCORDING  TO  SLIDING  SCALE  AVERAGING  15  UNITS  PER  INJECTION  PER  PATIENT  Dispense: 75 mL; Refill: 3  - lisinopril (ZESTRIL) 5 MG tablet; Take 1 tablet (5 mg) by mouth daily  Dispense: 90 tablet; Refill: 1    2. CARDIOVASCULAR SCREENING; LDL GOAL LESS THAN 100  Awaiting labs  - Lipid panel reflex to direct LDL Fasting; Future    Return in about 3 months (around 10/29/2020) for Diabetes check, BP Recheck.      Phone call duration:  11 minutes 7:46 AM to 7:57 AM    Thiago Lemons MD

## 2020-08-07 DIAGNOSIS — E10.43 TYPE 1 DIABETES MELLITUS WITH PERIPHERAL AUTONOMIC NEUROPATHY (H): ICD-10-CM

## 2020-08-07 RX ORDER — INSULIN GLARGINE 100 [IU]/ML
INJECTION, SOLUTION SUBCUTANEOUS
Qty: 15 ML | Refills: 0 | Status: SHIPPED | OUTPATIENT
Start: 2020-08-07 | End: 2020-09-18

## 2020-08-07 NOTE — TELEPHONE ENCOUNTER
"Routing refill request to provider for review/approval because:  Labs not current:  CR, A1C  T'd up 1 refill request for provider review.    Requested Prescriptions   Pending Prescriptions Disp Refills     insulin glargine (BASAGLAR KWIKPEN) 100 UNIT/ML pen [Pharmacy Med Name: Basaglar KwikPen 100 UNIT/ML Subcutaneous Solution Pen-injector] 15 mL 0     Sig: INJECT 45 UNITS SUBCUTANEOUSLY ONCE DAILY . APPOINTMENT REQUIRED FOR FUTURE REFILLS   Last Written Prescription Date:  6/17/2020  Last Fill Quantity: 15 ml,  # refills: 0   Last office visit: 7/29/2020 with prescribing provider:     Future Office Visit:        Long Acting Insulin Protocol Failed - 8/7/2020  6:51 AM        Failed - Serum creatinine on file in past 12 months     Recent Labs   Lab Test 02/26/18  1608   CR 0.77     Ok to refill medication if creatinine is low          Failed - HgbA1C in past 3 or 6 months     If HgbA1C is 8 or greater, it needs to be on file within the past 3 months.  If less than 8, must be on file within the past 6 months.     Recent Labs   Lab Test 08/30/19  1510   A1C 7.5*           Passed - Medication is active on med list        Passed - Patient is age 18 or older        Passed - Recent (6 mo) or future (30 days) visit within the authorizing provider's specialty     Patient had office visit in the last 6 months or has a visit in the next 30 days with authorizing provider or within the authorizing provider's specialty.  See \"Patient Info\" tab in inbasket, or \"Choose Columns\" in Meds & Orders section of the refill encounter.             Jeanne Palmer RN      "

## 2020-08-17 ENCOUNTER — OFFICE VISIT (OUTPATIENT)
Dept: ORTHOPEDICS | Facility: CLINIC | Age: 30
End: 2020-08-17
Payer: COMMERCIAL

## 2020-08-17 ENCOUNTER — ANCILLARY PROCEDURE (OUTPATIENT)
Dept: GENERAL RADIOLOGY | Facility: CLINIC | Age: 30
End: 2020-08-17
Attending: PHYSICAL MEDICINE & REHABILITATION
Payer: COMMERCIAL

## 2020-08-17 VITALS
BODY MASS INDEX: 23.59 KG/M2 | WEIGHT: 164.8 LBS | DIASTOLIC BLOOD PRESSURE: 76 MMHG | HEIGHT: 70 IN | SYSTOLIC BLOOD PRESSURE: 112 MMHG

## 2020-08-17 DIAGNOSIS — M16.51 POST-TRAUMATIC OSTEOARTHRITIS OF RIGHT HIP: ICD-10-CM

## 2020-08-17 DIAGNOSIS — M16.51 POST-TRAUMATIC OSTEOARTHRITIS OF RIGHT HIP: Primary | ICD-10-CM

## 2020-08-17 PROCEDURE — 99214 OFFICE O/P EST MOD 30 MIN: CPT | Performed by: PHYSICAL MEDICINE & REHABILITATION

## 2020-08-17 PROCEDURE — 73502 X-RAY EXAM HIP UNI 2-3 VIEWS: CPT | Mod: TC

## 2020-08-17 ASSESSMENT — MIFFLIN-ST. JEOR: SCORE: 1711.27

## 2020-08-17 NOTE — PATIENT INSTRUCTIONS
Today's Plan of Care:  -Referral to an Orthopedic Surgeon. Scheduling will reach out to you.  -Work note provided for today.     Follow Up: With orthopedic surgery.

## 2020-08-17 NOTE — LETTER
August 17, 2020      Rafat Baird  [address]   [place] [address]        To Whom It May Concern:    Rafat Baird  was seen on 8/17/2020. Please medically excuse him from work today 8/17/2020. He may return to work tomorrow.       Sincerely,        Nohemy Soares MD
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
## 2020-08-26 ENCOUNTER — OFFICE VISIT (OUTPATIENT)
Dept: ORTHOPEDICS | Facility: CLINIC | Age: 30
End: 2020-08-26
Attending: PHYSICAL MEDICINE & REHABILITATION
Payer: COMMERCIAL

## 2020-08-26 VITALS
WEIGHT: 166 LBS | DIASTOLIC BLOOD PRESSURE: 85 MMHG | HEIGHT: 70 IN | SYSTOLIC BLOOD PRESSURE: 127 MMHG | BODY MASS INDEX: 23.77 KG/M2

## 2020-08-26 DIAGNOSIS — M16.51 POST-TRAUMATIC OSTEOARTHRITIS OF RIGHT HIP: ICD-10-CM

## 2020-08-26 PROCEDURE — 99213 OFFICE O/P EST LOW 20 MIN: CPT | Performed by: PHYSICIAN ASSISTANT

## 2020-08-26 ASSESSMENT — MIFFLIN-ST. JEOR: SCORE: 1716.68

## 2020-08-26 ASSESSMENT — PAIN SCALES - GENERAL: PAINLEVEL: EXTREME PAIN (8)

## 2020-08-26 NOTE — LETTER
[address] [address]  Phone: [phone]  Fax: [phone]    August 26, 2020        Rafat Baird  [id number]    [facility] [address]          To whom it may concern:    RE: Rafat Baird    Patient was seen and treated today at our clinic.  Seated work only for 4 weeks.     Please contact me for questions or concerns.      Sincerely,        Bebeto Colindres, DO
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
## 2020-08-27 NOTE — TELEPHONE ENCOUNTER
RECORDS RECEIVED FROM: Post-traumatic osteoarthritis of right hip /Dr Colindres and Dae Jamison PA-C/XR/Commercial/ortho con-Okay to overbook per Juan at triage line   DATE RECEIVED: Sep 3, 2020     NOTES STATUS DETAILS   OFFICE NOTE from referring provider Internal    OFFICE NOTE from other specialist N/A    DISCHARGE SUMMARY from hospital N/A    DISCHARGE REPORT from the ER N/A    OPERATIVE REPORT N/A    MEDICATION LIST Internal    IMPLANT RECORD/STICKER N/A    LABS     CBC/DIFF N/A    CULTURES N/A    INJECTIONS DONE IN RADIOLOGY N/A    MRI N/A    CT SCAN N/A    XRAYS (IMAGES & REPORTS) Internal    TUMOR     PATHOLOGY  Slides & report N/A    08/27/20   11:26 AM   Complete  Keiry Tello CMA

## 2020-09-03 ENCOUNTER — PREP FOR PROCEDURE (OUTPATIENT)
Dept: ORTHOPEDICS | Facility: CLINIC | Age: 30
End: 2020-09-03

## 2020-09-03 ENCOUNTER — OFFICE VISIT (OUTPATIENT)
Dept: ORTHOPEDICS | Facility: CLINIC | Age: 30
End: 2020-09-03
Payer: COMMERCIAL

## 2020-09-03 ENCOUNTER — PRE VISIT (OUTPATIENT)
Dept: ORTHOPEDICS | Facility: CLINIC | Age: 30
End: 2020-09-03

## 2020-09-03 VITALS — HEIGHT: 70 IN | BODY MASS INDEX: 23.77 KG/M2 | WEIGHT: 166 LBS

## 2020-09-03 DIAGNOSIS — M16.51 POST-TRAUMATIC OSTEOARTHRITIS OF RIGHT HIP: Primary | ICD-10-CM

## 2020-09-03 ASSESSMENT — MIFFLIN-ST. JEOR: SCORE: 1716.68

## 2020-09-03 NOTE — NURSING NOTE
Teaching Flowsheet   Relevant Diagnosis: Right Total hip Arthroplasty  Teaching Topic: Pre op teaching     Person(s) involved in teaching:   Patient and Father     Motivation Level:  Asks Questions: Yes  Eager to Learn: Yes  Cooperative: Yes  Receptive (willing/able to accept information): Yes  Any cultural factors/[employer]ism beliefs that may influence understanding or compliance? No       Patient demonstrates understanding of the following:  Reason for the appointment, diagnosis and treatment plan: Yes  Knowledge of proper use of medications and conditions for which they are ordered (with special attention to potential side effects or drug interactions): Yes  Which situations necessitate calling provider and whom to contact: Yes       Teaching Concerns Addressed: RN discussed all aspects of pre and post op teaching. RN asked patient to sign up for total joint class. Patient will need lab work done. Patient will need to cease smoking. Kay will schedule surgery date and PAC appt. Told patient to get covid 19 testing done 3-4 days prior to surgery. Patient lives in [place]. Patient's dad Wellington is the .       Proper use and care of meds (medical equip, care aids, etc.): Yes  Nutritional needs and diet plan: Yes  Pain management techniques: Yes  Wound Care: Yes  How and/when to access community resources: Yes     Instructional Materials Used/Given: pre op packet, antibacterial soap. ABX reminder for dental work. Total joint class info and packet.     Time spent with patient: 15 minutes.
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
## 2020-09-16 ENCOUNTER — TELEPHONE (OUTPATIENT)
Dept: ORTHOPEDICS | Facility: CLINIC | Age: 30
End: 2020-09-16

## 2020-09-16 NOTE — TELEPHONE ENCOUNTER
Phoned patient to schedule surgery with Dr Jaime. I left him my direct number to call back at his convenience. [phone].

## 2020-09-18 DIAGNOSIS — E10.43 TYPE 1 DIABETES MELLITUS WITH PERIPHERAL AUTONOMIC NEUROPATHY (H): ICD-10-CM

## 2020-09-18 RX ORDER — INSULIN GLARGINE 100 [IU]/ML
INJECTION, SOLUTION SUBCUTANEOUS
Qty: 15 ML | Refills: 0 | Status: SHIPPED | OUTPATIENT
Start: 2020-09-18 | End: 2020-10-01

## 2020-09-18 NOTE — TELEPHONE ENCOUNTER
Routing refill request to provider for review/approval because:  Labs not current:  Creatinine, A1C    AUNDREA Hackett, RN  [facility]

## 2020-10-01 ENCOUNTER — OFFICE VISIT (OUTPATIENT)
Dept: FAMILY MEDICINE | Facility: CLINIC | Age: 30
End: 2020-10-01
Payer: COMMERCIAL

## 2020-10-01 VITALS
WEIGHT: 159 LBS | OXYGEN SATURATION: 98 % | DIASTOLIC BLOOD PRESSURE: 80 MMHG | TEMPERATURE: 98.3 F | BODY MASS INDEX: 22.76 KG/M2 | RESPIRATION RATE: 14 BRPM | HEIGHT: 70 IN | SYSTOLIC BLOOD PRESSURE: 110 MMHG | HEART RATE: 126 BPM

## 2020-10-01 DIAGNOSIS — M13.851 OTHER SPECIFIED ARTHRITIS, RIGHT HIP: ICD-10-CM

## 2020-10-01 DIAGNOSIS — E10.43 TYPE 1 DIABETES MELLITUS WITH PERIPHERAL AUTONOMIC NEUROPATHY (H): ICD-10-CM

## 2020-10-01 DIAGNOSIS — Z13.6 CARDIOVASCULAR SCREENING; LDL GOAL LESS THAN 100: ICD-10-CM

## 2020-10-01 DIAGNOSIS — Z01.818 PREOP GENERAL PHYSICAL EXAM: Primary | ICD-10-CM

## 2020-10-01 LAB
ALBUMIN SERPL-MCNC: 3.9 G/DL (ref 3.4–5)
ALP SERPL-CCNC: 124 U/L (ref 40–150)
ALT SERPL W P-5'-P-CCNC: 31 U/L (ref 0–70)
ANION GAP SERPL CALCULATED.3IONS-SCNC: 5 MMOL/L (ref 3–14)
AST SERPL W P-5'-P-CCNC: 21 U/L (ref 0–45)
BILIRUB SERPL-MCNC: 1.1 MG/DL (ref 0.2–1.3)
BUN SERPL-MCNC: 12 MG/DL (ref 7–30)
CALCIUM SERPL-MCNC: 9.6 MG/DL (ref 8.5–10.1)
CHLORIDE SERPL-SCNC: 106 MMOL/L (ref 94–109)
CHOLEST SERPL-MCNC: 150 MG/DL
CO2 SERPL-SCNC: 27 MMOL/L (ref 20–32)
CREAT SERPL-MCNC: 0.89 MG/DL (ref 0.66–1.25)
ERYTHROCYTE [DISTWIDTH] IN BLOOD BY AUTOMATED COUNT: 11.6 % (ref 10–15)
GFR SERPL CREATININE-BSD FRML MDRD: >90 ML/MIN/{1.73_M2}
GLUCOSE SERPL-MCNC: 173 MG/DL (ref 70–99)
HBA1C MFR BLD: 6.9 % (ref 0–5.6)
HCT VFR BLD AUTO: 48.3 % (ref 40–53)
HDLC SERPL-MCNC: 31 MG/DL
HGB BLD-MCNC: 16.6 G/DL (ref 13.3–17.7)
LDLC SERPL CALC-MCNC: 103 MG/DL
MCH RBC QN AUTO: 30.9 PG (ref 26.5–33)
MCHC RBC AUTO-ENTMCNC: 34.4 G/DL (ref 31.5–36.5)
MCV RBC AUTO: 90 FL (ref 78–100)
NONHDLC SERPL-MCNC: 119 MG/DL
PLATELET # BLD AUTO: 223 10E9/L (ref 150–450)
POTASSIUM SERPL-SCNC: 4.3 MMOL/L (ref 3.4–5.3)
PROT SERPL-MCNC: 7.5 G/DL (ref 6.8–8.8)
RBC # BLD AUTO: 5.38 10E12/L (ref 4.4–5.9)
SODIUM SERPL-SCNC: 138 MMOL/L (ref 133–144)
TRIGL SERPL-MCNC: 80 MG/DL
WBC # BLD AUTO: 8.5 10E9/L (ref 4–11)

## 2020-10-01 PROCEDURE — 80053 COMPREHEN METABOLIC PANEL: CPT | Performed by: FAMILY MEDICINE

## 2020-10-01 PROCEDURE — 99214 OFFICE O/P EST MOD 30 MIN: CPT | Performed by: FAMILY MEDICINE

## 2020-10-01 PROCEDURE — 85027 COMPLETE CBC AUTOMATED: CPT | Performed by: FAMILY MEDICINE

## 2020-10-01 PROCEDURE — 80061 LIPID PANEL: CPT | Performed by: FAMILY MEDICINE

## 2020-10-01 PROCEDURE — 36415 COLL VENOUS BLD VENIPUNCTURE: CPT | Performed by: FAMILY MEDICINE

## 2020-10-01 PROCEDURE — 83036 HEMOGLOBIN GLYCOSYLATED A1C: CPT | Performed by: FAMILY MEDICINE

## 2020-10-01 RX ORDER — INSULIN GLARGINE 100 [IU]/ML
INJECTION, SOLUTION SUBCUTANEOUS
Qty: 15 ML | Refills: 0 | Status: SHIPPED | OUTPATIENT
Start: 2020-10-01 | End: 2020-12-03

## 2020-10-01 RX ORDER — INSULIN LISPRO 100 [IU]/ML
INJECTION, SOLUTION INTRAVENOUS; SUBCUTANEOUS
Qty: 75 ML | Refills: 3 | Status: SHIPPED | OUTPATIENT
Start: 2020-10-01 | End: 2022-02-01

## 2020-10-01 ASSESSMENT — MIFFLIN-ST. JEOR: SCORE: 1677.47

## 2020-10-01 ASSESSMENT — PATIENT HEALTH QUESTIONNAIRE - PHQ9: SUM OF ALL RESPONSES TO PHQ QUESTIONS 1-9: 1

## 2020-10-01 NOTE — LETTER
October 3, 2020      Rafat Martínezin  [id number]    [facility] [id number]        Dear ,    We are writing to inform you of your test results.    Your test results fall within the expected range(s) or remain unchanged from previous results.  Please continue with your current treatment plan although consideration for medication for Cholesterol and  triglyceride numbers could be offered if desired.    Resulted Orders   **Comprehensive metabolic panel FUTURE anytime   Result Value Ref Range    Sodium 138 133 - 144 mmol/L    Potassium 4.3 3.4 - 5.3 mmol/L    Chloride 106 94 - 109 mmol/L    Carbon Dioxide 27 20 - 32 mmol/L    Anion Gap 5 3 - 14 mmol/L    Glucose 173 (H) 70 - 99 mg/dL      Comment:      Fasting specimen    Urea Nitrogen 12 7 - 30 mg/dL    Creatinine 0.89 0.66 - 1.25 mg/dL    GFR Estimate >90 >60 mL/min/[1.73_m2]      Comment:      Non  GFR Calc  Starting 12/18/2018, serum creatinine based estimated GFR (eGFR) will be   calculated using the Chronic Kidney Disease Epidemiology Collaboration   (CKD-EPI) equation.      GFR Estimate If Black >90 >60 mL/min/[1.73_m2]      Comment:       GFR Calc  Starting 12/18/2018, serum creatinine based estimated GFR (eGFR) will be   calculated using the Chronic Kidney Disease Epidemiology Collaboration   (CKD-EPI) equation.      Calcium 9.6 8.5 - 10.1 mg/dL    Bilirubin Total 1.1 0.2 - 1.3 mg/dL    Albumin 3.9 3.4 - 5.0 g/dL    Protein Total 7.5 6.8 - 8.8 g/dL    Alkaline Phosphatase 124 40 - 150 U/L    ALT 31 0 - 70 U/L    AST 21 0 - 45 U/L   **CBC with platelets FUTURE anytime   Result Value Ref Range    WBC 8.5 4.0 - 11.0 10e9/L    RBC Count 5.38 4.4 - 5.9 10e12/L    Hemoglobin 16.6 13.3 - 17.7 g/dL    Hematocrit 48.3 40.0 - 53.0 %    MCV 90 78 - 100 fl    MCH 30.9 26.5 - 33.0 pg    MCHC 34.4 31.5 - 36.5 g/dL    RDW 11.6 10.0 - 15.0 %    Platelet Count 223 150 - 450 10e9/L   **A1C FUTURE anytime   Result Value Ref Range    Hemoglobin A1C  6.9 (H) 0 - 5.6 %      Comment:      Normal <5.7% Prediabetes 5.7-6.4%  Diabetes 6.5% or higher - adopted from ADA   consensus guidelines.     Lipid panel reflex to direct LDL Fasting   Result Value Ref Range    Cholesterol 150 <200 mg/dL    Triglycerides 80 <150 mg/dL      Comment:      Fasting specimen    HDL Cholesterol 31 (L) >39 mg/dL    LDL Cholesterol Calculated 103 (H) <100 mg/dL      Comment:      Above desirable:  100-129 mg/dl  Borderline High:  130-159 mg/dL  High:             160-189 mg/dL  Very high:       >189 mg/dl      Non HDL Cholesterol 119 <130 mg/dL       If you have any questions or concerns, please call the clinic at the number listed above.       Sincerely,        Jose Sood MD/ac
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
## 2020-10-01 NOTE — PATIENT INSTRUCTIONS

## 2020-12-20 ENCOUNTER — HOSPITAL ENCOUNTER (EMERGENCY)
Facility: CLINIC | Age: 30
Discharge: HOME | End: 2020-12-20
Attending: PHYSICIAN ASSISTANT | Admitting: PHYSICIAN ASSISTANT
Payer: COMMERCIAL

## 2020-12-20 ENCOUNTER — APPOINTMENT (OUTPATIENT)
Dept: CT IMAGING | Facility: CLINIC | Age: 30
End: 2020-12-20
Attending: PHYSICIAN ASSISTANT
Payer: COMMERCIAL

## 2020-12-20 ENCOUNTER — APPOINTMENT (OUTPATIENT)
Dept: GENERAL RADIOLOGY | Facility: CLINIC | Age: 30
End: 2020-12-20
Attending: PHYSICIAN ASSISTANT
Payer: COMMERCIAL

## 2020-12-20 VITALS
HEART RATE: 81 BPM | HEIGHT: 71 IN | OXYGEN SATURATION: 99 % | SYSTOLIC BLOOD PRESSURE: 118 MMHG | RESPIRATION RATE: 14 BRPM | BODY MASS INDEX: 23.1 KG/M2 | DIASTOLIC BLOOD PRESSURE: 80 MMHG | WEIGHT: 165 LBS | TEMPERATURE: 99.2 F

## 2020-12-20 DIAGNOSIS — S70.01XA CONTUSION OF RIGHT HIP, INITIAL ENCOUNTER: ICD-10-CM

## 2020-12-20 DIAGNOSIS — S80.01XA CONTUSION OF RIGHT KNEE, INITIAL ENCOUNTER: ICD-10-CM

## 2020-12-20 PROCEDURE — 73562 X-RAY EXAM OF KNEE 3: CPT | Mod: RT

## 2020-12-20 PROCEDURE — 73700 CT LOWER EXTREMITY W/O DYE: CPT | Mod: RT

## 2020-12-20 PROCEDURE — 99285 EMERGENCY DEPT VISIT HI MDM: CPT | Mod: 25 | Performed by: PHYSICIAN ASSISTANT

## 2020-12-20 PROCEDURE — 96372 THER/PROPH/DIAG INJ SC/IM: CPT | Performed by: PHYSICIAN ASSISTANT

## 2020-12-20 PROCEDURE — 29505 APPLICATION LONG LEG SPLINT: CPT | Mod: RT

## 2020-12-20 PROCEDURE — 99284 EMERGENCY DEPT VISIT MOD MDM: CPT | Performed by: PHYSICIAN ASSISTANT

## 2020-12-20 PROCEDURE — 250N000013 HC RX MED GY IP 250 OP 250 PS 637: Performed by: PHYSICIAN ASSISTANT

## 2020-12-20 PROCEDURE — 250N000011 HC RX IP 250 OP 636: Performed by: PHYSICIAN ASSISTANT

## 2020-12-20 PROCEDURE — 73502 X-RAY EXAM HIP UNI 2-3 VIEWS: CPT

## 2020-12-20 RX ORDER — KETOROLAC TROMETHAMINE 30 MG/ML
30 INJECTION, SOLUTION INTRAMUSCULAR; INTRAVENOUS ONCE
Status: COMPLETED | OUTPATIENT
Start: 2020-12-20 | End: 2020-12-20

## 2020-12-20 RX ORDER — OXYCODONE AND ACETAMINOPHEN 5; 325 MG/1; MG/1
1 TABLET ORAL EVERY 6 HOURS PRN
Qty: 12 TABLET | Refills: 0 | Status: SHIPPED | OUTPATIENT
Start: 2020-12-20 | End: 2022-02-01

## 2020-12-20 RX ORDER — OXYCODONE HYDROCHLORIDE 5 MG/1
5 TABLET ORAL ONCE
Status: COMPLETED | OUTPATIENT
Start: 2020-12-20 | End: 2020-12-20

## 2020-12-20 RX ADMIN — OXYCODONE HYDROCHLORIDE 5 MG: 5 TABLET ORAL at 19:56

## 2020-12-20 RX ADMIN — KETOROLAC TROMETHAMINE 30 MG: 30 INJECTION, SOLUTION INTRAMUSCULAR at 20:31

## 2020-12-20 ASSESSMENT — MIFFLIN-ST. JEOR: SCORE: 1720.57

## 2020-12-20 NOTE — ED AVS SNAPSHOT
[facility] Emergency Dept  [address] [place] [address]  Phone: [phone]  Fax: [phone]                                    Rafat Baird   MRN: [id number]    Department: [facility] Emergency Dept   Date of Visit: 12/20/2020           After Visit Summary Signature Page    I have received my discharge instructions, and my questions have been answered. I have discussed any challenges I see with this plan with the nurse or doctor.    ..........................................................................................................................................  Patient/Patient Representative Signature      ..........................................................................................................................................  Patient Representative Print Name and Relationship to Patient    ..................................................               ................................................  Date                                   Time    ..........................................................................................................................................  Reviewed by Signature/Title    ...................................................              ..............................................  Date                                               Time          22EPIC Rev 08/18

## 2020-12-21 NOTE — ED TRIAGE NOTES
R leg injury dog pit bull (pt own dog) playing running went right into pt R leg, c/o pain r knee/hip/femur. Hx R hip surgery x7 weeks ago.

## 2020-12-21 NOTE — DISCHARGE INSTRUCTIONS
Your imaging was fortunately negative for any fractures.  It is likely you have some bruising to the knee and hip.  Please take ibuprofen for pain.  Reserve the use of Percocet for severe breakthrough pain.  Contact your orthopedic surgeon in the morning and let them know you you were here today.  Follow-up with them as indicated.  If you develop any worsening symptoms please return to the emergency department.    Thank you for choosing [facility]'s Emergency Department. It was a pleasure taking care of you today. If you have any questions, please call [phone].    Aleida Adam PA-C
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
## 2021-01-01 ENCOUNTER — TRANSFERRED RECORDS (OUTPATIENT)
Dept: MULTI SPECIALTY CLINIC | Facility: CLINIC | Age: 31
End: 2021-01-01
Payer: COMMERCIAL

## 2021-01-01 LAB — RETINOPATHY: NORMAL

## 2021-02-17 DIAGNOSIS — E10.43 TYPE 1 DIABETES MELLITUS WITH PERIPHERAL AUTONOMIC NEUROPATHY (H): ICD-10-CM

## 2021-04-05 DIAGNOSIS — E10.43 TYPE 1 DIABETES MELLITUS WITH PERIPHERAL AUTONOMIC NEUROPATHY (H): ICD-10-CM

## 2021-04-05 NOTE — TELEPHONE ENCOUNTER
Routing refill request to provider for review/approval because:  Labs not current:  A1C    AUNDREA Hackett, RN  [facility]

## 2021-05-17 DIAGNOSIS — E10.43 TYPE 1 DIABETES MELLITUS WITH PERIPHERAL AUTONOMIC NEUROPATHY (H): ICD-10-CM

## 2021-05-17 NOTE — TELEPHONE ENCOUNTER
Routing refill request to provider for review/approval because:  Labs not current:  A1C  Patient needs to be seen because:  Due for an office visit    AUNDREA Hackett, RN  [facility]

## 2021-05-18 DIAGNOSIS — E10.43 TYPE 1 DIABETES MELLITUS WITH PERIPHERAL AUTONOMIC NEUROPATHY (H): ICD-10-CM

## 2021-05-18 NOTE — LETTER
May 20, 2021      Rafat Baird  [id number]    [facility] [id number]        Dear Rafat,     You are due to be seen before any more refills, please call and make an appt.       Sincerely,        [facility]

## 2021-05-19 RX ORDER — INSULIN ASPART 100 [IU]/ML
INJECTION, SOLUTION INTRAVENOUS; SUBCUTANEOUS
Qty: 6 ML | Refills: 0 | Status: SHIPPED | OUTPATIENT
Start: 2021-05-19 | End: 2021-08-13

## 2021-05-19 NOTE — TELEPHONE ENCOUNTER
Called and LM for patient to call back. Please relay note below and help schedule appointment.   Geno Lee MA

## 2021-05-19 NOTE — TELEPHONE ENCOUNTER
Routing refill request to provider for review/approval because:  Coral given x1 and patient did not follow up, please advise  Labs not current:  A1C  Patient needs to be seen because:  Due for follow up    AUNDREA Hackett, RN  [facility]

## 2021-06-27 DIAGNOSIS — E10.43 TYPE 1 DIABETES MELLITUS WITH PERIPHERAL AUTONOMIC NEUROPATHY (H): ICD-10-CM

## 2021-06-29 NOTE — TELEPHONE ENCOUNTER
Spoke with patient and informed of note below. Patient states that he is unable to come in at this time. His insurance will not kick in for 90 days. Routing to provider to advise if able to fill until then.     Geno Lee MA

## 2021-06-29 NOTE — TELEPHONE ENCOUNTER
Lantus Prescription approved per [facility] Refill Protocol.  Due for kapil and labs. Will forward to [place] to schedule diabetes follow-up appt. Thank you.....................RADHA Manriquez

## 2021-08-11 DIAGNOSIS — E10.43 TYPE 1 DIABETES MELLITUS WITH PERIPHERAL AUTONOMIC NEUROPATHY (H): ICD-10-CM

## 2021-08-13 DIAGNOSIS — E10.43 TYPE 1 DIABETES MELLITUS WITH PERIPHERAL AUTONOMIC NEUROPATHY (H): ICD-10-CM

## 2021-08-13 NOTE — TELEPHONE ENCOUNTER
Pending Prescriptions:                       Disp   Refills    LANTUS SOLOSTAR 100 UNIT/ML soln [Pharmacy*15 mL  0        Sig: INJECT 45 UNITS SUBCUTANEOUSLY ONCE DAILY .           APPOINTMENT REQUIRED FOR FUTURE REFILLS      Routing refill request to provider for review/approval because:  Coral given x1 and patient did not follow up, please advise    Lauren Walton, RN on 8/13/2021 at 10:17 AM

## 2021-08-16 NOTE — TELEPHONE ENCOUNTER
"Routing refill request to provider for review/approval because:  Labs not current:  A1C  T'd up 6 ml for provider review.      Requested Prescriptions   Pending Prescriptions Disp Refills     Insulin Aspart FlexPen 100 UNIT/ML SOPN 6 mL 0   Last Written Prescription Date:  5/21/2021  Last Fill Quantity: 6 ml,  # refills: 0   Last office visit: 10/1/2020 with prescribing provider:     Future Office Visit:        Short Acting Insulin Protocol Failed - 8/13/2021  2:15 PM        Failed - HgbA1C in past 3 or 6 months     If HgbA1C is 8 or greater, it needs to be on file within the past 3 months.  If less than 8, must be on file within the past 6 months.     Recent Labs   Lab Test 10/01/20  1342   A1C 6.9*             Failed - Recent (6 mo) or future (30 days) visit within the authorizing provider's specialty     Patient had office visit in the last 6 months or has a visit in the next 30 days with authorizing provider or within the authorizing provider's specialty.  See \"Patient Info\" tab in inbasket, or \"Choose Columns\" in Meds & Orders section of the refill encounter.            Passed - Serum creatinine on file in past 12 months     Recent Labs   Lab Test 10/01/20  1342   CR 0.89       Ok to refill medication if creatinine is low          Passed - Medication is active on med list        Passed - Patient is age 18 or older         Jeanne Palmer RN      "

## 2021-08-20 RX ORDER — INSULIN ASPART 100 [IU]/ML
15 INJECTION, SOLUTION INTRAVENOUS; SUBCUTANEOUS
Qty: 6 ML | Refills: 0 | Status: SHIPPED | OUTPATIENT
Start: 2021-08-20 | End: 2021-09-22

## 2021-09-18 DIAGNOSIS — E10.43 TYPE 1 DIABETES MELLITUS WITH PERIPHERAL AUTONOMIC NEUROPATHY (H): ICD-10-CM

## 2021-09-20 DIAGNOSIS — E10.43 TYPE 1 DIABETES MELLITUS WITH PERIPHERAL AUTONOMIC NEUROPATHY (H): ICD-10-CM

## 2021-09-20 NOTE — TELEPHONE ENCOUNTER
Requested Prescriptions   Pending Prescriptions Disp Refills     Insulin Aspart FlexPen 100 UNIT/ML SOPN 6 mL 0     Sig: Inject 15 Units Subcutaneous 4 times a day at 7:30am, 11:30am, 4:30pm, and 9:00pm inject according to sliding scale averaging 15 units per injection

## 2021-09-21 NOTE — TELEPHONE ENCOUNTER
Routing refill request to provider for review/approval because:  Coral given x1 and patient did not follow up, please advise  Patient needs to be seen because:  overdue for 6 month diabetic follow up    AUNDREA Hackett, RN  [facility]

## 2021-09-22 RX ORDER — INSULIN ASPART 100 [IU]/ML
15 INJECTION, SOLUTION INTRAVENOUS; SUBCUTANEOUS
Qty: 6 ML | Refills: 0 | Status: SHIPPED | OUTPATIENT
Start: 2021-09-22 | End: 2021-12-17

## 2021-09-22 NOTE — TELEPHONE ENCOUNTER
"Routing refill request to provider for review/approval because:  Labs not current:  A1C      Requested Prescriptions   Pending Prescriptions Disp Refills     Insulin Aspart FlexPen 100 UNIT/ML SOPN 6 mL 0     Sig: Inject 15 Units Subcutaneous 4 times a day at 7:30am, 11:30am, 4:30pm, and 9:00pm inject according to sliding scale averaging 15 units per injection     Last office visit: 10/1/2020   Future Office Visit:        Short Acting Insulin Protocol Failed - 9/20/2021  7:50 AM        Failed - HgbA1C in past 3 or 6 months     If HgbA1C is 8 or greater, it needs to be on file within the past 3 months.  If less than 8, must be on file within the past 6 months.     Recent Labs   Lab Test 10/01/20  1342   A1C 6.9*             Failed - Recent (6 mo) or future (30 days) visit within the authorizing provider's specialty     Patient had office visit in the last 6 months or has a visit in the next 30 days with authorizing provider or within the authorizing provider's specialty.  See \"Patient Info\" tab in inbasket, or \"Choose Columns\" in Meds & Orders section of the refill encounter.            Passed - Serum creatinine on file in past 12 months     Recent Labs   Lab Test 10/01/20  1342   CR 0.89       Ok to refill medication if creatinine is low          Passed - Medication is active on med list        Passed - Patient is age 18 or older         Jeanne Palmer RN      "

## 2021-11-05 DIAGNOSIS — E10.43 TYPE 1 DIABETES MELLITUS WITH PERIPHERAL AUTONOMIC NEUROPATHY (H): ICD-10-CM

## 2021-11-05 NOTE — TELEPHONE ENCOUNTER
Pending Prescriptions:                       Disp   Refills    LANTUS SOLOSTAR 100 UNIT/ML soln [Pharmacy*15 mL  0        Sig: INJECT 45 UNITS SUBCUTANEOUSLY ONCE DAILY .           APPOINTMENT REQUIRED FOR FUTURE REFILLS      Routing refill request to provider for review/approval because:  [place] given x1 and patient did not follow up, please advise    Lauren Walton, RN on 11/5/2021 at 4:43 PM

## 2021-11-10 RX ORDER — INSULIN GLARGINE 100 [IU]/ML
INJECTION, SOLUTION SUBCUTANEOUS
Qty: 15 ML | Refills: 0 | Status: SHIPPED | OUTPATIENT
Start: 2021-11-10 | End: 2021-12-16

## 2021-12-14 DIAGNOSIS — E10.43 TYPE 1 DIABETES MELLITUS WITH PERIPHERAL AUTONOMIC NEUROPATHY (H): ICD-10-CM

## 2021-12-16 RX ORDER — INSULIN GLARGINE 100 [IU]/ML
INJECTION, SOLUTION SUBCUTANEOUS
Qty: 15 ML | Refills: 0 | Status: SHIPPED | OUTPATIENT
Start: 2021-12-16 | End: 2022-01-26

## 2021-12-16 NOTE — TELEPHONE ENCOUNTER
Routing refill request to provider for review/approval because:  Coral given x1 and patient did not follow up, please advise  Patient needs to be seen because it has been more than 1 year since last office visit.    KELLY HackettN, RN  [facility]

## 2021-12-17 DIAGNOSIS — E10.43 TYPE 1 DIABETES MELLITUS WITH PERIPHERAL AUTONOMIC NEUROPATHY (H): ICD-10-CM

## 2021-12-17 RX ORDER — INSULIN ASPART 100 [IU]/ML
15 INJECTION, SOLUTION INTRAVENOUS; SUBCUTANEOUS
Qty: 6 ML | Refills: 0 | Status: SHIPPED | OUTPATIENT
Start: 2021-12-17 | End: 2022-01-26

## 2021-12-17 NOTE — TELEPHONE ENCOUNTER
Patient is informed he is due for an appointment.     Routing refill request to provider for review/approval because:  Patient needs to be seen because it has been more than 1 year since last office visit.    KELLY HackettN, RN  [facility]

## 2022-01-25 DIAGNOSIS — E10.43 TYPE 1 DIABETES MELLITUS WITH PERIPHERAL AUTONOMIC NEUROPATHY (H): ICD-10-CM

## 2022-01-26 RX ORDER — INSULIN GLARGINE 100 [IU]/ML
INJECTION, SOLUTION SUBCUTANEOUS
Qty: 15 ML | Refills: 0 | Status: SHIPPED | OUTPATIENT
Start: 2022-01-26 | End: 2022-02-01

## 2022-01-26 RX ORDER — INSULIN ASPART 100 [IU]/ML
INJECTION, SOLUTION INTRAVENOUS; SUBCUTANEOUS
Qty: 15 ML | Refills: 0 | Status: SHIPPED | OUTPATIENT
Start: 2022-01-26 | End: 2023-02-20

## 2022-01-26 NOTE — TELEPHONE ENCOUNTER
Lantus  Routing refill request to provider for review/approval because:  Labs not current:  CRE, HgbA1c  Patient needs to be seen because:  6 month diabetic visit due    Insulin Aspart  Routing refill request to provider for review/approval because:  Labs not current:  HgbA1c, CRE  Patient needs to be seen because:  6 month diabetic visit due      Inocencio Serrato RN

## 2022-02-01 ENCOUNTER — VIRTUAL VISIT (OUTPATIENT)
Dept: FAMILY MEDICINE | Facility: OTHER | Age: 32
End: 2022-02-01
Payer: COMMERCIAL

## 2022-02-01 ENCOUNTER — MYC MEDICAL ADVICE (OUTPATIENT)
Dept: FAMILY MEDICINE | Facility: OTHER | Age: 32
End: 2022-02-01

## 2022-02-01 DIAGNOSIS — E10.43 TYPE 1 DIABETES MELLITUS WITH PERIPHERAL AUTONOMIC NEUROPATHY (H): Primary | ICD-10-CM

## 2022-02-01 DIAGNOSIS — U07.1 INFECTION DUE TO 2019 NOVEL CORONAVIRUS: ICD-10-CM

## 2022-02-01 PROBLEM — S82.832D: Status: RESOLVED | Noted: 2018-06-05 | Resolved: 2022-02-01

## 2022-02-01 PROBLEM — S91.312A: Status: RESOLVED | Noted: 2017-04-13 | Resolved: 2022-02-01

## 2022-02-01 PROBLEM — S82.302D: Status: RESOLVED | Noted: 2018-06-05 | Resolved: 2022-02-01

## 2022-02-01 PROCEDURE — 99215 OFFICE O/P EST HI 40 MIN: CPT | Mod: TEL | Performed by: PHYSICIAN ASSISTANT

## 2022-02-01 RX ORDER — ALBUTEROL SULFATE 90 UG/1
1-2 AEROSOL, METERED RESPIRATORY (INHALATION) EVERY 4 HOURS PRN
Qty: 18 G | Refills: 1 | Status: SHIPPED | OUTPATIENT
Start: 2022-02-01 | End: 2022-03-03

## 2022-02-01 RX ORDER — INSULIN GLARGINE 100 [IU]/ML
45 INJECTION, SOLUTION SUBCUTANEOUS AT BEDTIME
Qty: 45 ML | Refills: 4 | Status: SHIPPED | OUTPATIENT
Start: 2022-02-01 | End: 2023-02-20

## 2022-02-01 NOTE — PROGRESS NOTES
Patient called and noted that her back pain is not improving with her prescribed Norco. She reports she has not started her steroids or picked up the Flexeril course from her PCP's office. Recommended that she start the steroids and  the Flexeril and give them a trial, then follow up with her PCP if symptoms do not improve.     Patient also requested a work excuse note for today to be sent through the online portal. Letter sent.   Rafat is a 31 year old who is being evaluated via a billable telephone visit.      What phone number would you like to be contacted at? See fletcher   How would you like to obtain your AVS? MyChart    Assessment & Plan     ICD-10-CM    1. Type 1 diabetes mellitus with peripheral autonomic neuropathy (H)  E10.43 Lipid panel reflex to direct LDL Fasting     Hemoglobin A1c     Comprehensive metabolic panel (BMP + Alb, Alk Phos, ALT, AST, Total. Bili, TP)     Albumin Random Urine Quantitative with Creat Ratio     REVIEW OF HEALTH MAINTENANCE PROTOCOL ORDERS     insulin glargine (LANTUS SOLOSTAR) 100 UNIT/ML pen     insulin aspart (NOVOLOG PEN) 100 UNIT/ML pen   2. Infection due to 2019 novel coronavirus  U07.1 albuterol (PROAIR HFA/PROVENTIL HFA/VENTOLIN HFA) 108 (90 Base) MCG/ACT inhaler     1. Diabetes  - Patient due for lab update, hasn't had since 2020   - Recommend schedule fasting labs when feeling better   - Reviewed his insulin regimen, is going well for him, not too many lows and last A1C was 6.9   - Encouraged efforts   - Did briefly discuss pump therapy, patient will think about and let me know if wants endocrinology referral   - Reviewed all medication use and side effects, refilled   - Patient was previously on Lisinopril, but stopped this, was not for BPs as always well controlled, chart reviewed previous microalbuminuria, discussed this is why lisinopril would have been started, recommend getting new lab before deciding if needs to be re-started   - Lipids have never been elevated     2. COVID-19   - Patient not vaccinated, had symptoms started 1/25/21 (about 7 days ago), tested positive with at home test right after, then tested this past Sunday and was negative. He attempted to return to work yesterday but symptoms returned and worsened   - Having cough, congestion, and mild SWANN   - Discussed needs to remain in quarantine for full 10 days plus symptom improvement before returning to work   - Gave work  note  - Recheck 1 week if not improving and can update letter   - Offered steroids for symptoms, but this would elevate glucose so patient declined   - Instead will do albuterol inhaler PRN, reviewed use and side effects   - Encouraged rest and fluids  - Discussed warning signs that would warrant return to clinic/ED     Review of the result(s) of each unique test - See list       Care Everywhere 10/28/20 - BMP       10/1/20 - Lipid, A1C, CMP   Diagnosis or treatment significantly limited by social determinants of health - None     40 minutes spent on the date of the encounter doing chart review, history and exam, documentation and further activities as noted above    The patient indicates understanding of these issues and agrees with the plan.    Follow up: when better schedule fasting labs, 1 week if not improving     SIERRA Hernandez [facility] JUAN PABLO Douglass is a 31 year old who presents for the following health issues     HPI   Diabetes Follow-up    How often are you checking your blood sugar? Four or more times daily  Blood sugar testing frequency justification:  Risk of hypoglycemia with medication(s)  What time of day are you checking your blood sugars (select all that apply)?  Before and after meals  Have you had any blood sugars above 200?  Yes   Have you had any blood sugars below 70?  Yes - last Wednesday, 53 when sick     What symptoms do you notice when your blood sugar is low?  Shaky and Other: sweaty    What concerns do you have today about your diabetes? None     Do you have any of these symptoms? (Select all that apply)  Numbness in feet    Have you had a diabetic eye exam in the last 12 months? Yes- Date of last eye exam: Will check on this ,  Location: Will check on this     - Since sick, BG's up and down, highest was 210 on Saturday      Yesterday and today around 115   - Diabetic for 20, age 9   - Uses carb counting and sliding scale, maybe  around 40 units a day      1 box per month      (max 50 units)      1 unit per 15 grams + sliding scale       COVID-19 (about 7 days)   - Needs note for work note - sheet [employer]   - Last Tuesday, wasn't feeling well, took at home test, is positive   - Took another COVID-19 test on Sunday and was negative   - Went back to work Monday  - Today feeling very fatigued   - Having shortness of breath, headache, congestion      Diarrhea last 2 days       BP Readings from Last 2 Encounters:   12/20/20 118/80   10/01/20 110/80     Hemoglobin A1C POCT (%)   Date Value   10/01/2020 6.9 (H)   08/30/2019 7.5 (H)     LDL Cholesterol Calculated (mg/dL)   Date Value   10/01/2020 103 (H)   03/30/2017 92       Review of Systems   Constitutional, HEENT, cardiovascular, pulmonary, gi and gu systems are negative, except as otherwise noted.      Objective       Vitals:  No vitals were obtained today due to virtual visit.    Physical Exam   healthy, alert and no distress  PSYCH: Alert and oriented times 3; coherent speech, normal   rate and volume, able to articulate logical thoughts, able   to abstract reason, no tangential thoughts, no hallucinations   or delusions  His affect is normal  RESP: No cough, no audible wheezing, able to talk in full sentences  Remainder of exam unable to be completed due to telephone visits    Diagnostics: See orders pending in [facility]   - Reviewed in Epic       Phone call duration: 22 minutes

## 2022-02-01 NOTE — LETTER
[facility]  [address]  [place] [address]  Phone: [phone]    February 1, 2022        Rafat Baird  [id number]    [facility] [id number]          To whom it may concern:    RE: Rafat Baird    Patient was seen and treated today at our clinic and missed work. Due to continue symptoms of COVID-19, patient is to be excused from work until the following criteria have been met   1. >10 days have passed since positive test  2. Marked improvement in shortness of breath, cough, and other respiratory symptoms.  3. No fever for >48 hours without use of antipyretics       Please contact me for questions or concerns.      Sincerely,        Leah Blackmon PA-C

## 2022-02-12 ENCOUNTER — HEALTH MAINTENANCE LETTER (OUTPATIENT)
Age: 32
End: 2022-02-12

## 2022-03-03 ENCOUNTER — E-VISIT (OUTPATIENT)
Dept: FAMILY MEDICINE | Facility: OTHER | Age: 32
End: 2022-03-03
Payer: COMMERCIAL

## 2022-03-03 ENCOUNTER — E-VISIT (OUTPATIENT)
Dept: URGENT CARE | Facility: URGENT CARE | Age: 32
End: 2022-03-03
Payer: COMMERCIAL

## 2022-03-03 DIAGNOSIS — R05.9 COUGH: ICD-10-CM

## 2022-03-03 DIAGNOSIS — J06.9 VIRAL URI: Primary | ICD-10-CM

## 2022-03-03 DIAGNOSIS — R09.81 CONGESTION OF PARANASAL SINUS: Primary | ICD-10-CM

## 2022-03-03 PROCEDURE — 99421 OL DIG E/M SVC 5-10 MIN: CPT | Performed by: FAMILY MEDICINE

## 2022-03-03 PROCEDURE — 99207 PR NON-BILLABLE SERV PER CHARTING: CPT | Performed by: PHYSICIAN ASSISTANT

## 2022-03-03 NOTE — TELEPHONE ENCOUNTER
- Patient not vaccinated     >24 hours, patient did not respond. Also had 2 e-visits today and was advised to go to UC in other visit.   Will close    Provider E-Visit time total (minutes): 0      Leo Blackmon PA-C  [facility]

## 2022-03-03 NOTE — PATIENT INSTRUCTIONS
Dear Rafat Baird,    We are sorry you are not feeling well. Based on the responses you provided, it is recommended that you be seen in-person in urgent care so we can better evaluate your symptoms. Please click here to find the nearest urgent care location to you.   You will not be charged for this Visit. Thank you for trusting us with your care.    Brooke Chadwick MD

## 2022-03-07 RX ORDER — BENZONATATE 200 MG/1
200 CAPSULE ORAL 3 TIMES DAILY PRN
Qty: 30 CAPSULE | Refills: 0 | Status: SHIPPED | OUTPATIENT
Start: 2022-03-07 | End: 2023-02-20

## 2022-03-21 ENCOUNTER — TELEPHONE (OUTPATIENT)
Dept: FAMILY MEDICINE | Facility: OTHER | Age: 32
End: 2022-03-21
Payer: COMMERCIAL

## 2022-03-21 NOTE — TELEPHONE ENCOUNTER
Prior Authorization Retail Medication Request    Medication/Dose: NovoLOG FlexPen 100unit/ml pen injectors   ICD code (if different than what is on RX):    Previously Tried and Failed:   Rationale:     Insurance Name:   Insurance ID:       Pharmacy Information (if different than what is on RX)  Name:    Phone:

## 2022-03-24 NOTE — TELEPHONE ENCOUNTER
Central Prior Authorization Team   Phone: [phone]    PA Initiation    Medication: NovoLOG FlexPen 100unit/ml pen injectors   Insurance Company: [employer] - Phone [phone] Fax [phone]  Pharmacy Filling the Rx: [facility] [address] [place] [address] - [address]  Filling Pharmacy Phone: [phone]  Filling Pharmacy Fax:    Start Date: 3/24/2022

## 2022-03-24 NOTE — TELEPHONE ENCOUNTER
PRIOR AUTHORIZATION DENIED    Medication: NovoLOG FlexPen 100unit/ml pen injectors     Denial Date: 3/24/2022    Denial Rational:                  Appeal Information:    If you would like to appeal, please supply P/A team with a letter of medical necessity with clinical reason.

## 2022-03-25 NOTE — TELEPHONE ENCOUNTER
Please ask patient which medication this interferes with, as insulins typically do not interfere. Novolog and humalog are very similar so I would need to know specifics if we are going to appeal    Leo Ross-SIERRA Arriola  [facility]
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
## 2022-07-20 ENCOUNTER — NURSE TRIAGE (OUTPATIENT)
Dept: FAMILY MEDICINE | Facility: OTHER | Age: 32
End: 2022-07-20

## 2022-07-20 ENCOUNTER — E-VISIT (OUTPATIENT)
Dept: FAMILY MEDICINE | Facility: OTHER | Age: 32
End: 2022-07-20

## 2022-07-20 ENCOUNTER — MYC MEDICAL ADVICE (OUTPATIENT)
Dept: FAMILY MEDICINE | Facility: CLINIC | Age: 32
End: 2022-07-20

## 2022-07-20 DIAGNOSIS — Z53.9 ERRONEOUS ENCOUNTER--DISREGARD: Primary | ICD-10-CM

## 2022-07-20 NOTE — TELEPHONE ENCOUNTER
RN Called to discuss further.   Unable to reach the patient.   Sent BellaDati message to the patient.   Davidson Darden RN

## 2022-07-20 NOTE — TELEPHONE ENCOUNTER
Patient submitted E-visit for nausea, muscle aches, & cramping after working in the heat     Not appropriate for E-visit. Please triage and disposition accordingly.     Leo Blackmon PA-C  [facility]

## 2022-07-21 NOTE — TELEPHONE ENCOUNTER
Patient responded through mychart. Currently the patient was at work and is feeling better. Advised in person visit if symptoms persist.     Davidson Darden RN

## 2022-09-12 DIAGNOSIS — E10.43 TYPE 1 DIABETES MELLITUS WITH PERIPHERAL AUTONOMIC NEUROPATHY (H): ICD-10-CM

## 2022-09-14 NOTE — TELEPHONE ENCOUNTER
"Pending Prescriptions:                       Disp   Refills    insulin aspart (NOVOLOG PEN) 100 UNIT/ML p*60 mL  3        Sig: Inject 4 times daily per 1 unit per 15 grams and per           sliding scale (Max 50 units/day)        Routing refill request to provider for review/approval because:    Short Acting Insulin Protocol Failed    Rerun Protocol (9/12/2022 4:39 PM)      Serum creatinine on file in past 12 months        Recent Labs   Lab Test 10/01/20  1342   CR 0.89      Ok to refill medication if creatinine is low      HgbA1C in past 3 or 6 months    If HgbA1C is 8 or greater, it needs to be on file within the past 3 months.  If less than 8, must be on file within the past 6 months.          Recent Labs   Lab Test 10/01/20  1342   A1C 6.9*         Medication is active on med list          Patient is age 18 or older          Recent (6 mo) or future (30 days) visit within the authorizing provider's specialty    Patient had office visit in the last 6 months or has a visit in the next 30 days with authorizing provider or within the authorizing provider's specialty.  See \"Patient Info\" tab in inbasket, or \"Choose Columns\" in Meds & Orders section of the refill encounter.        "

## 2022-10-09 ENCOUNTER — HEALTH MAINTENANCE LETTER (OUTPATIENT)
Age: 32
End: 2022-10-09

## 2023-01-20 ENCOUNTER — APPOINTMENT (OUTPATIENT)
Dept: GENERAL RADIOLOGY | Facility: CLINIC | Age: 33
End: 2023-01-20
Attending: PHYSICIAN ASSISTANT
Payer: COMMERCIAL

## 2023-01-20 ENCOUNTER — HOSPITAL ENCOUNTER (EMERGENCY)
Facility: CLINIC | Age: 33
Discharge: HOME | End: 2023-01-20
Attending: PHYSICIAN ASSISTANT | Admitting: PHYSICIAN ASSISTANT
Payer: COMMERCIAL

## 2023-01-20 VITALS
SYSTOLIC BLOOD PRESSURE: 140 MMHG | RESPIRATION RATE: 18 BRPM | HEART RATE: 86 BPM | OXYGEN SATURATION: 100 % | DIASTOLIC BLOOD PRESSURE: 74 MMHG | TEMPERATURE: 98.3 F | BODY MASS INDEX: 24.41 KG/M2 | WEIGHT: 175 LBS
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
## 2023-01-20 DIAGNOSIS — M79.605 PAIN OF LEFT LOWER EXTREMITY: ICD-10-CM

## 2023-01-20 DIAGNOSIS — M25.551 HIP PAIN, RIGHT: ICD-10-CM

## 2023-01-20 PROCEDURE — 73502 X-RAY EXAM HIP UNI 2-3 VIEWS: CPT

## 2023-01-20 PROCEDURE — 73610 X-RAY EXAM OF ANKLE: CPT | Mod: LT

## 2023-01-20 PROCEDURE — 99283 EMERGENCY DEPT VISIT LOW MDM: CPT | Performed by: PHYSICIAN ASSISTANT

## 2023-01-20 PROCEDURE — 99285 EMERGENCY DEPT VISIT HI MDM: CPT | Performed by: PHYSICIAN ASSISTANT

## 2023-01-20 PROCEDURE — 73590 X-RAY EXAM OF LOWER LEG: CPT | Mod: LT

## 2023-01-20 PROCEDURE — 73630 X-RAY EXAM OF FOOT: CPT | Mod: LT

## 2023-01-20 ASSESSMENT — ACTIVITIES OF DAILY LIVING (ADL): ADLS_ACUITY_SCORE: 35
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
## 2023-01-20 NOTE — DISCHARGE INSTRUCTIONS
It was a pleasure working with you today!  I hope your condition improves rapidly!     Thankfully, your x-rays did not show any new fractures.  The hardware looks great as well.  No sign of screw or hardware loosening.  I would ice the painful areas for the next 24 hours for 20 minutes every 2 hours.  Then, switch to heat.  It is okay to use ibuprofen 600 mg every 6 hours as needed for pain and inflammation.  Otherwise, you can use Tylenol up to 1000 mg every 6 hours as needed for pain.

## 2023-01-20 NOTE — LETTER
January 20, 2023      To Whom It May Concern:      Rafat Baird was seen in our Emergency Department today, 01/20/23.  I expect his condition to improve over the next couple days.  He may return to work when improved.  Please excuse him for any time that he missed due to his medical condition.      Sincerely,          Terrence Fish PA-C

## 2023-01-20 NOTE — ED TRIAGE NOTES
Pt reports falling in his driveway this morning, he is having right hip pain and left leg pain, he reports he had a hip replacement on the right hip and has compartment syndrome on the left leg

## 2023-02-20 ENCOUNTER — OFFICE VISIT (OUTPATIENT)
Dept: FAMILY MEDICINE | Facility: OTHER | Age: 33
End: 2023-02-20
Payer: COMMERCIAL

## 2023-02-20 ENCOUNTER — E-VISIT (OUTPATIENT)
Dept: URGENT CARE | Facility: CLINIC | Age: 33
End: 2023-02-20
Payer: COMMERCIAL

## 2023-02-20 VITALS
DIASTOLIC BLOOD PRESSURE: 68 MMHG | BODY MASS INDEX: 24.41 KG/M2 | TEMPERATURE: 99.7 F | WEIGHT: 170.5 LBS | RESPIRATION RATE: 18 BRPM | SYSTOLIC BLOOD PRESSURE: 102 MMHG | HEIGHT: 70 IN | HEART RATE: 88 BPM | OXYGEN SATURATION: 98 %

## 2023-02-20 DIAGNOSIS — E10.43 TYPE 1 DIABETES MELLITUS WITH PERIPHERAL AUTONOMIC NEUROPATHY (H): ICD-10-CM

## 2023-02-20 DIAGNOSIS — J01.90 ACUTE SINUSITIS WITH SYMPTOMS > 10 DAYS: Primary | ICD-10-CM

## 2023-02-20 DIAGNOSIS — R05.1 ACUTE COUGH: ICD-10-CM

## 2023-02-20 DIAGNOSIS — Z53.9 DIAGNOSIS NOT YET DEFINED: Primary | ICD-10-CM

## 2023-02-20 PROCEDURE — 99214 OFFICE O/P EST MOD 30 MIN: CPT | Performed by: PHYSICIAN ASSISTANT

## 2023-02-20 RX ORDER — BENZONATATE 200 MG/1
200 CAPSULE ORAL 3 TIMES DAILY PRN
Qty: 30 CAPSULE | Refills: 0 | Status: SHIPPED | OUTPATIENT
Start: 2023-02-20

## 2023-02-20 RX ORDER — INSULIN GLARGINE 100 [IU]/ML
45 INJECTION, SOLUTION SUBCUTANEOUS AT BEDTIME
Qty: 45 ML | Refills: 4 | Status: SHIPPED | OUTPATIENT
Start: 2023-02-20 | End: 2024-03-28

## 2023-02-20 RX ORDER — INSULIN ASPART 100 [IU]/ML
INJECTION, SOLUTION INTRAVENOUS; SUBCUTANEOUS
Qty: 15 ML | Refills: 0 | Status: CANCELLED | OUTPATIENT
Start: 2023-02-20

## 2023-02-20 ASSESSMENT — PATIENT HEALTH QUESTIONNAIRE - PHQ9
10. IF YOU CHECKED OFF ANY PROBLEMS, HOW DIFFICULT HAVE THESE PROBLEMS MADE IT FOR YOU TO DO YOUR WORK, TAKE CARE OF THINGS AT HOME, OR GET ALONG WITH OTHER PEOPLE: NOT DIFFICULT AT ALL
SUM OF ALL RESPONSES TO PHQ QUESTIONS 1-9: 1
SUM OF ALL RESPONSES TO PHQ QUESTIONS 1-9: 1

## 2023-02-20 ASSESSMENT — PAIN SCALES - GENERAL: PAINLEVEL: MILD PAIN (3)

## 2023-02-20 NOTE — PROGRESS NOTES
Assessment & Plan     ICD-10-CM    1. Acute sinusitis with symptoms > 10 days  J01.90 amoxicillin-clavulanate (AUGMENTIN) 875-125 MG tablet      2. Acute cough  R05.1 benzonatate (TESSALON) 200 MG capsule      3. Type 1 diabetes mellitus with peripheral autonomic neuropathy (H)  E10.43 insulin glargine (LANTUS SOLOSTAR) 100 UNIT/ML pen     insulin aspart (NOVOLOG PEN) 100 UNIT/ML pen          1 & 2.   - Patent with diabetes type 1 and 1 week of sinus symptoms and cough   - On exam, findings most consistent with sinus infection       Recommend treatment due to comorbidities and duration   - Discussed antibiotic use, duration, and side effects  - Offered Tessalon for cough, patient reports this helped in the past, reviewed use and side effects, refilled   - Encouraged rest and fluid   - work note given     3. Diabetes  - reports checking frequently while sick, highest was around 202   - Doing very well  - Reviewed insulin use and side effects including s/s of hypoglycemia and how to treat        Review of the result(s) of each unique test - None   Diagnosis or treatment significantly limited by social determinants of health - none     25 minutes spent on the date of the encounter doing chart review, history and exam, documentation and further activities as noted above    The patient indicates understanding of these issues and agrees with the plan.    Leah Ross-SIERRA Arriola [facility]        Carlos Heller is a 32 year old, presenting for the following health issues:  URI      History of Present Illness       Reason for visit:  Cough/hacking up mucus and vomiting    He eats 0-1 servings of fruits and vegetables daily.He consumes 3 sweetened beverage(s) daily.He exercises with enough effort to increase his heart rate 60 or more minutes per day.  He exercises with enough effort to increase his heart rate 7 days per week.   He is taking medications regularly.    Today's PHQ-9       "   PHQ-9 Total Score: 1    PHQ-9 Q9 Thoughts of better off dead/self-harm past 2 weeks :   Not at all    How difficult have these problems made it for you to do your work, take care of things at home, or get along with other people: Not difficult at all       Acute Illness  Acute illness concerns: coughing, sinus pressure, 2 negative covid tests   Onset/Duration: 1 week ago  Symptoms:  Fever: No  Chills/Sweats: No  Headache (location?): No  Sinus Pressure: YES  Conjunctivitis:  No  Ear Pain: no  Rhinorrhea: YES  Congestion: YES  Sore Throat: No  Cough: YES-non-productive, productive of clear sputum, with shortness of breath, barking, worsening over time  Wheeze: YES  Decreased Appetite: No  Nausea: No  Vomiting: YES  Diarrhea: YES  Dysuria/Freq.: No  Dysuria or Hematuria: No  Fatigue/Achiness: YES  Sick/Strep Exposure: No  Therapies tried and outcome: mucinex - helped a little       - BG's have been good       Review of Systems   Constitutional, HEENT, cardiovascular, pulmonary, gi and gu systems are negative, except as otherwise noted.      Objective    /68   Pulse 88   Temp 99.7  F (37.6  C) (Temporal)   Resp 18   Ht 1.773 m (5' 9.8\")   Wt 77.3 kg (170 lb 8 oz)   SpO2 98%   BMI 24.60 kg/m    Body mass index is 24.6 kg/m .  Physical Exam   GENERAL APPEARANCE: ill appearing, alert and no distress  EYES: Eyes grossly normal to inspection, PERRLA, conjunctivae and sclerae without injection or discharge, EOM intact   HENT: Bilateral ear canals without erythema or cerumen, bilateral TM's with purulent effusion, left with injection and erythema, no bulging, nasal turbinates with moderate-severe swelling & erythema, yellow-green nasal discharge, mouth without ulcers or lesions, oropharynx clear and oral mucous membranes moist, bilateral sinus tenderness   NECK: Bilateral submandibular adenopathy, no adenopathy in cervical or supraclavicular regions  RESP: Lungs clear to auscultation - no rales, rhonchi or " wheezes   CV: Regular rates and rhythm, normal S1 S2, no S3 or S4, no murmur, click or rub  MS: No musculoskeletal defects are noted and gait is age appropriate without ataxia   SKIN: No suspicious lesions or rashes, hydration status appears adeuqate with normal skin turgor   PSYCH: Alert and oriented x3; speech- coherent , normal rate and volume; able to articulate logical thoughts, able to abstract reason, no tangential thoughts, no hallucinations or delusions, mentation appears normal, Mood is euthymic. Affect is appropriate for this mood state and bright. Thought content is free of suicidal ideation, hallucinations, and delusions. Dress is adequate and upkept. Eye contact is good during conversation.       Diagnostics: none

## 2023-02-20 NOTE — LETTER
[facility]  [address]  [place] [address]  Phone: [phone]    February 20, 2023        Rafat Baird  [id number]    [place] [address]          To whom it may concern:    RE: Rafat CHAS Martínezin    Patient was seen and treated today at our clinic and missed work.    Please contact me for questions or concerns.      Sincerely,        Leah Blackmon PA-C

## 2023-02-20 NOTE — PATIENT INSTRUCTIONS
Dear Rafat Baird,    We are sorry you are not feeling well. Based on the responses you provided, it is recommended that you be seen in-person in urgent care so we can better evaluate your symptoms. Please click here to find the nearest urgent care location to you.   You will not be charged for this Visit. Thank you for trusting us with your care.    Kasey Ferrara PA-C

## 2023-03-25 ENCOUNTER — HEALTH MAINTENANCE LETTER (OUTPATIENT)
Age: 33
End: 2023-03-25

## 2023-03-31 NOTE — LETTER
[address] [address]  Phone: [phone]  Fax: [phone]        May 17, 2018      Rafat Baird                                                                                                                                [id number]    [place] [address]            Dear Julieth Brie,    We are concerned about your health care.  We recently provided you with a medication refill.  Many medications require routine follow-up with your Doctor.      At this time we ask that: You schedule a routine office visit with your physician to follow your diabetes     Your prescription: Has been refilled for 1 month so you may have time for the above noted follow-up.      Thank you,      Dr. Sood/ [facility]            Negative

## 2023-05-21 ENCOUNTER — HEALTH MAINTENANCE LETTER (OUTPATIENT)
Age: 33
End: 2023-05-21

## 2023-12-01 ENCOUNTER — MYC MEDICAL ADVICE (OUTPATIENT)
Dept: FAMILY MEDICINE | Facility: OTHER | Age: 33
End: 2023-12-01
Payer: COMMERCIAL

## 2023-12-01 NOTE — TELEPHONE ENCOUNTER
Patient Quality Outreach    Patient is due for the following:   Diabetes -  A1C, Eye Exam, Diabetic Follow-Up Visit, and Foot Exam    Next Steps:   Schedule a Adult Preventative    Type of outreach:    Sent [facility] message.      Questions for provider review:    None           Sindi Falcon, [facility]

## 2024-01-06 ENCOUNTER — HEALTH MAINTENANCE LETTER (OUTPATIENT)
Age: 34
End: 2024-01-06

## 2024-03-28 DIAGNOSIS — E10.43 TYPE 1 DIABETES MELLITUS WITH PERIPHERAL AUTONOMIC NEUROPATHY (H): ICD-10-CM

## 2024-03-28 RX ORDER — INSULIN GLARGINE 100 [IU]/ML
45 INJECTION, SOLUTION SUBCUTANEOUS AT BEDTIME
Qty: 15 ML | Refills: 0 | Status: SHIPPED | OUTPATIENT
Start: 2024-03-28 | End: 2024-06-20

## 2024-03-28 NOTE — TELEPHONE ENCOUNTER
Patient is very overdue for appointment for diabetes and lab work (hasn't had labs done since 2020)--please have him schedule with [facility] ASAP.

## 2024-03-28 NOTE — TELEPHONE ENCOUNTER
Called number on file. Is disconnected. Pt not active on [employer] in 9 months. Pharmacy will put note with prescription to call clinic/make appt.

## 2024-05-25 ENCOUNTER — HEALTH MAINTENANCE LETTER (OUTPATIENT)
Age: 34
End: 2024-05-25

## 2024-06-17 DIAGNOSIS — E10.43 TYPE 1 DIABETES MELLITUS WITH PERIPHERAL AUTONOMIC NEUROPATHY (H): ICD-10-CM

## 2024-06-17 RX ORDER — INSULIN ASPART 100 [IU]/ML
INJECTION, SOLUTION INTRAVENOUS; SUBCUTANEOUS
Qty: 45 ML | Refills: 0 | OUTPATIENT
Start: 2024-06-17

## 2024-06-17 NOTE — TELEPHONE ENCOUNTER
See previous refill 3/28/24.     Phone disconnected. Not seen in >1 year.      Rx denied.     Leo Ross-SIERRA Arriola  [facility]

## 2024-06-20 ENCOUNTER — MYC MEDICAL ADVICE (OUTPATIENT)
Dept: FAMILY MEDICINE | Facility: OTHER | Age: 34
End: 2024-06-20
Payer: COMMERCIAL

## 2024-06-20 ENCOUNTER — MYC REFILL (OUTPATIENT)
Dept: FAMILY MEDICINE | Facility: OTHER | Age: 34
End: 2024-06-20
Payer: COMMERCIAL

## 2024-06-20 DIAGNOSIS — E10.43 TYPE 1 DIABETES MELLITUS WITH PERIPHERAL AUTONOMIC NEUROPATHY (H): ICD-10-CM

## 2024-06-20 RX ORDER — INSULIN ASPART 100 [IU]/ML
INJECTION, SOLUTION INTRAVENOUS; SUBCUTANEOUS
Qty: 45 ML | Refills: 0 | OUTPATIENT
Start: 2024-06-20

## 2024-06-20 RX ORDER — INSULIN GLARGINE 100 [IU]/ML
45 INJECTION, SOLUTION SUBCUTANEOUS AT BEDTIME
Qty: 15 ML | Refills: 0 | OUTPATIENT
Start: 2024-06-20

## 2024-06-20 NOTE — TELEPHONE ENCOUNTER
"FYI ONLY     RN called pt and explained that an appointment is needed. Pt will not make an appointment because he has been a diabetic for 32 years.     RN advised we need labs and physical assessments to be sure he is on the correct dosing.     Pt states \" what law says this.\" And ended phone call     Nicolasa Damian RN  "

## 2024-06-20 NOTE — TELEPHONE ENCOUNTER
See previous refill 3/28/24.      Phone disconnected. Not seen in >1 year.        Rx denied.      Leo Ross-SIERRA Arriola  [facility]

## 2024-06-20 NOTE — TELEPHONE ENCOUNTER
See previous refill 3/28/24.      Phone disconnected. Not seen in >1 year.        Rx denied.      Leo Ross-SIERRA Arriola  [facility]

## 2024-08-03 ENCOUNTER — HEALTH MAINTENANCE LETTER (OUTPATIENT)
Age: 34
End: 2024-08-03

## 2024-12-01 DIAGNOSIS — E10.43 TYPE 1 DIABETES MELLITUS WITH PERIPHERAL AUTONOMIC NEUROPATHY (H): ICD-10-CM

## 2024-12-02 RX ORDER — INSULIN GLARGINE 100 [IU]/ML
INJECTION, SOLUTION SUBCUTANEOUS
Qty: 15 ML | Refills: 0 | OUTPATIENT
Start: 2024-12-02

## 2024-12-02 NOTE — TELEPHONE ENCOUNTER
Although insulin is a very important medication, it has risks as well and requires monitoring. We have a responsibility to safely prescribe these medications. Unless there are extenuating circumstances, we need to make a plan to be seen for monitoring of these medications.     Continue to offer assistance. We could get care coordination involved with insurance, help with payment plans, etc.     Rx denied.     Leo Ross-SIERRA Arriola  [facility]

## 2024-12-02 NOTE — TELEPHONE ENCOUNTER
"Patient called back and states he will come in and do labs but declines appointment. He states \"I am uninsured and will only do labs, I am doing good otherwise, you cannot deny me my insulin.\"    Reviewed need for monitoring and state options for assistance. Stated he will agree to labs and we can message him thru MyChart. He verified while on phone with RN he has current access      Request refill    Labs?    Carina Tomlin RN  [facility] - Registered Nurse  Clinic Triage Jacob   December 2, 2024    "

## 2024-12-02 NOTE — TELEPHONE ENCOUNTER
Clinic RN: Please investigate patient's chart or contact patient if the information cannot be found because patient should have run out of this medication on 7/2024. Confirm patient is taking this medication as prescribed. Document findings and route refill encounter to provider for approval or denial.    Radha Giraldo, KELLYN, RN

## 2024-12-02 NOTE — TELEPHONE ENCOUNTER
Patient Contact    Attempt # 1    Was call answered?  No.  Left message on voicemail with information to call me back.    Patient has not been seen in office since 2/2023.   Back in June we had attempted to schedule patient but he was declining appointments.    Regarding: FW: Sore and Redness in mouth and throat   Contact: [phone]  I think he is going to need to be seen in office, it is kind of hard to tell from the picture because the camera wants to focus on the near images which are his teeth and it is hard to see the characteristics of the rash in his mouth.  I would also like to get more history on him as well.   ----- Message -----  From: Misty Woodson MA  Sent: 4/28/2023  11:28 AM EDT  To: Violet Marino DO  Subject: Sore and Redness in mouth and throat             ----- Message from Misty Woodson MA sent at 4/28/2023 11:28 AM EDT -----       ----- Message sent from Misty Woodson MA to James Cleveland at 4/28/2023 11:25 AM -----   Okay I am addressing this with the on call provider since Dr. Ndiaye is not here today.       ----- Message -----       From:James Cleveland       Sent:4/28/2023  9:47 AM EDT         To:Patient Medical Advice Request Message List    Subject:Sore and Redness in mouth and throat     Tell the Doctor that I had a moderna booster on 12/2/2022 [facility]      ----- Message -----       From:James Cleveland       Sent:4/28/2023  9:44 AM EDT         To:Patient Medical Advice Request Message List    Subject:Sore and Redness in mouth and throat     The new inhaler was not filled by the Pharmacy I had just refilled the other one last Friday and I guess they thought I didn't need another new one.       ----- Message -----       From:Misty JULIO       Sent:4/28/2023  9:39 AM EDT         To:James Cleveland    Subject:Sore and Redness in mouth and throat     Have you started the inhaler yet?      ----- Message -----       From:James Cleveland       Sent:4/26/2023 10:43 PM EDT         To:Ashwini Ndiaye    Subject:Sore and Redness in mouth and throat     Is this caused by the Medicine I'm taking or was taking

## 2024-12-12 ENCOUNTER — MYC MEDICAL ADVICE (OUTPATIENT)
Dept: FAMILY MEDICINE | Facility: OTHER | Age: 34
End: 2024-12-12
Payer: COMMERCIAL

## 2024-12-19 ENCOUNTER — OFFICE VISIT (OUTPATIENT)
Dept: FAMILY MEDICINE | Facility: OTHER | Age: 34
End: 2024-12-19

## 2024-12-19 VITALS
WEIGHT: 174 LBS | TEMPERATURE: 98.2 F | HEART RATE: 96 BPM | BODY MASS INDEX: 25.11 KG/M2 | OXYGEN SATURATION: 96 % | SYSTOLIC BLOOD PRESSURE: 119 MMHG | DIASTOLIC BLOOD PRESSURE: 72 MMHG | RESPIRATION RATE: 16 BRPM

## 2024-12-19 DIAGNOSIS — E10.43 TYPE 1 DIABETES MELLITUS WITH PERIPHERAL AUTONOMIC NEUROPATHY (H): Primary | ICD-10-CM

## 2024-12-19 LAB
ALBUMIN SERPL BCG-MCNC: 4.3 G/DL (ref 3.5–5.2)
ALP SERPL-CCNC: 109 U/L (ref 40–150)
ALT SERPL W P-5'-P-CCNC: 30 U/L (ref 0–70)
ANION GAP SERPL CALCULATED.3IONS-SCNC: 9 MMOL/L (ref 7–15)
AST SERPL W P-5'-P-CCNC: 30 U/L (ref 0–45)
BILIRUB SERPL-MCNC: 0.7 MG/DL
BUN SERPL-MCNC: 21.7 MG/DL (ref 6–20)
CALCIUM SERPL-MCNC: 9.4 MG/DL (ref 8.8–10.4)
CHLORIDE SERPL-SCNC: 107 MMOL/L (ref 98–107)
CHOLEST SERPL-MCNC: 183 MG/DL
CREAT SERPL-MCNC: 1.06 MG/DL (ref 0.67–1.17)
CREAT UR-MCNC: 30.8 MG/DL
EGFRCR SERPLBLD CKD-EPI 2021: >90 ML/MIN/1.73M2
EST. AVERAGE GLUCOSE BLD GHB EST-MCNC: 200 MG/DL
FASTING STATUS PATIENT QL REPORTED: YES
FASTING STATUS PATIENT QL REPORTED: YES
GLUCOSE SERPL-MCNC: 112 MG/DL (ref 70–99)
HBA1C MFR BLD: 8.6 % (ref 0–5.6)
HCO3 SERPL-SCNC: 27 MMOL/L (ref 22–29)
HDLC SERPL-MCNC: 53 MG/DL
LDLC SERPL CALC-MCNC: 104 MG/DL
MICROALBUMIN UR-MCNC: <12 MG/L
MICROALBUMIN/CREAT UR: NORMAL MG/G{CREAT}
NONHDLC SERPL-MCNC: 130 MG/DL
POTASSIUM SERPL-SCNC: 4.3 MMOL/L (ref 3.4–5.3)
PROT SERPL-MCNC: 7 G/DL (ref 6.4–8.3)
SODIUM SERPL-SCNC: 143 MMOL/L (ref 135–145)
TRIGL SERPL-MCNC: 130 MG/DL

## 2024-12-19 RX ORDER — INSULIN GLARGINE 100 [IU]/ML
45 INJECTION, SOLUTION SUBCUTANEOUS DAILY
Qty: 45 ML | Refills: 3 | Status: SHIPPED | OUTPATIENT
Start: 2024-12-19 | End: 2024-12-19

## 2024-12-19 RX ORDER — INSULIN GLARGINE 100 [IU]/ML
23 INJECTION, SOLUTION SUBCUTANEOUS 2 TIMES DAILY
Qty: 45 ML | Refills: 3 | Status: SHIPPED | OUTPATIENT
Start: 2024-12-19

## 2024-12-19 ASSESSMENT — PAIN SCALES - GENERAL: PAINLEVEL_OUTOF10: NO PAIN (0)

## 2024-12-19 NOTE — PROGRESS NOTES
Assessment & Plan     ICD-10-CM    1. Type 1 diabetes mellitus with peripheral autonomic neuropathy (H)  E10.43 Comprehensive metabolic panel (BMP + Alb, Alk Phos, ALT, AST, Total. Bili, TP)     Lipid panel reflex to direct LDL Fasting     Hemoglobin A1c     Albumin Random Urine Quantitative with Creat Ratio     REVIEW OF HEALTH MAINTENANCE PROTOCOL ORDERS     FOOT EXAM     insulin aspart (NOVOLOG PEN) 100 UNIT/ML pen     insulin pen needle (31G X 5 MM) 31G X 5 MM miscellaneous     LANTUS SOLOSTAR 100 UNIT/ML soln     Hemoglobin A1c     DISCONTINUED: LANTUS SOLOSTAR 100 UNIT/ML soln        - Patient here to recheck diabetes that he has had since childhood   - Due to lack of insurance, has not had labs or a visit in many years   - Reports does take care of himself, has been mostly able to get his insulin up until about 1 week ago   - Reviewed risks of unmonitored diabetes and necessity for visits and labs      Patient still without insurance, will attempt to help him via MyChart     Did agree to labs as needed with visits a least once a year   - Labs were collected today      A1C at 8.6      Patient does state a few months ago moved Lantus to morning due to overnight lows      Recommend trial of dividing out lantus into BID dosing      Patient will try 22 units BID and increase to 24 units if needs      Update provider every 2-3 weeks with BG logs      Continue sliding scale with Novolog   - Reviewed s/s of hypoglycemia and how to treat   - Plan A1C in 3 months   - Reviewed risks of uncontrolled diabetes including heart, eyes, kidneys, etc.      Patient aware   - Encouraged his efforts with diet and exercise  - Await rest of labs     The patient indicates understanding of these issues and agrees with the plan.    Follow up: as outlined above     Leo Blackmon PA-C  [facility] - Yataheyeli Heller is a 34 year old, presenting for the following health issues:  Diabetes         12/19/2024     8:05 AM   Additional Questions   Roomed by jeremy   Accompanied by self     History of Present Illness       Diabetes:   He presents for follow up of diabetes.  He is checking home blood glucose four or more times daily.   He checks blood glucose before and after meals.  Blood glucose is sometimes over 200 and sometimes under 70. He is aware of hypoglycemia symptoms including other.    He has no concerns regarding his diabetes at this time.   He is not experiencing numbness or burning in feet, excessive thirst, blurry vision, weight changes or redness, sores or blisters on feet. The patient has not had a diabetic eye exam in the last 12 months.          He eats 0-1 servings of fruits and vegetables daily.He consumes 0 sweetened beverage(s) daily.He exercises with enough effort to increase his heart rate 30 to 60 minutes per day.  He exercises with enough effort to increase his heart rate 7 days per week.   He is taking medications regularly.     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      Objective    /72   Pulse 96   Temp 98.2  F (36.8  C) (Temporal)   Resp 16   Wt 78.9 kg (174 lb)   SpO2 96%   BMI 25.11 kg/m    Body mass index is 25.11 kg/m .  Physical Exam   GENERAL APPEARANCE: healthy, alert and no distress  EYES: Eyes grossly normal to inspection, PERRLA, conjunctivae and sclerae  without injection or discharge, EOM intact   HENT: Bilateral ear canals without erythema or cerumen, bilateral TM's pearly grey with normal light reflex, no effusion, injection, or bulging, nasal turbinates without swelling, erythema, or discharge, mouth without ulcers or lesions, oropharynx clear and oral mucous membranes moist, no sinus tenderness   NECK: No adenopathy in cervical, supraclavicular, or axillary regions, no asymmetry, masses, or scars, and thyroid normal to palpation, no JVD   RESP: Lungs clear to auscultation - no rales, rhonchi or wheezes   CV: Regular rates and rhythm, normal S1 S2, no S3 or S4, no murmur, click or rub, no peripheral edema and peripheral pulses strong and symmetric bilaterally   ABDOMEN: Soft, nontender, no hepatosplenomegaly, no masses and bowel sounds normal   MS: No musculoskeletal defects are noted and gait is age appropriate without ataxia   SKIN: No suspicious lesions or rashes, hydration status appears adeuqate with normal skin turgor   PSYCH: Alert and oriented x3; speech- coherent , normal rate and volume; able to articulate logical thoughts, able to abstract reason, no tangential thoughts, no hallucinations or delusions, mentation appears normal, Mood is euthymic. Affect is appropriate for this mood state and bright. Thought content is free of suicidal ideation, hallucinations, and delusions. Dress is adequate and upkept. Eye contact is good during conversation.      DIABETIC FOOT EXAM: Examination of the feet reveals normal posterior tibial and dorsalis pedis pulses.   Right foot - Skin to palpation and inspection is intact, without lesions, corns or calluses. No discoloration is present. Ten-gram monofilament nylon test reveals normal sensation over plantar surfaces of distal great toe, first, third, and fifth metatarsal heads.  Left foot - large deformity with scarring, small calluses base of 4th and 5th metatarsals, absent sensation throughout, normal pulses         Diagnostics: reviewed in Epic, see orders pending in Epic   Results for orders placed or performed in visit on 12/19/24   Hemoglobin A1c     Status: Abnormal   Result Value Ref Range    Estimated Average Glucose 200 (H) <117 mg/dL    Hemoglobin A1C 8.6 (H) 0.0 - 5.6 %    Narrative    Results confirmed by repeat test.                 Signed Electronically by: Leah Blackmon PA-C

## 2024-12-19 NOTE — RESULT ENCOUNTER NOTE
Gerson Heller    The rest of your results looked stable. Very mild cholesterol increase but otherwise normal. Great news.     The results are attached for your review.       Leo Blackmon PA-C

## 2024-12-19 NOTE — PATIENT INSTRUCTIONS
- Lantus changed to 22 - 24 units twice a day     - Send Leo message with BGs in 2-3 weeks     - A1C in 3 months

## 2025-06-10 ENCOUNTER — MYC MEDICAL ADVICE (OUTPATIENT)
Dept: FAMILY MEDICINE | Facility: OTHER | Age: 35
End: 2025-06-10

## 2025-06-10 NOTE — TELEPHONE ENCOUNTER
Patient Quality Outreach    Patient is due for the following:   Diabetes -  A1C, Eye Exam, and Diabetic Follow-Up Visit    Action(s) Taken:   Schedule a office visit for DM follow up    Type of outreach:    Sent [employer] message.    Questions for provider review:    None         Sindi Falcon, [facility]  Chart routed to None.

## 2025-06-14 ENCOUNTER — HEALTH MAINTENANCE LETTER (OUTPATIENT)
Age: 35
End: 2025-06-14

## 2025-07-05 ENCOUNTER — HEALTH MAINTENANCE LETTER (OUTPATIENT)
Age: 35
End: 2025-07-05

## (undated) DEVICE — BNDG ESMARK 4" STERILE

## (undated) DEVICE — DRSG XEROFORM 1X8"

## (undated) DEVICE — BASIN SET MINOR DISP

## (undated) DEVICE — PACK EXTREMITY SOP15EXFSD

## (undated) DEVICE — PREP CHLORAPREP 26ML TINTED ORANGE  260815

## (undated) DEVICE — SOL ISOPROPYL ALCOHOL USP 70% 16OZ  NDC10565-002-16 D0022

## (undated) DEVICE — BNDG ESMARK 6" STERILE

## (undated) DEVICE — CAST PLASTER SPLINT 5X30" 7395

## (undated) DEVICE — GLOVE PROTEXIS W/NEU-THERA 8.0  2D73TE80

## (undated) DEVICE — PREP SKIN SCRUB TRAY 4461A

## (undated) DEVICE — DRSG KERLIX FLUFFS X5

## (undated) DEVICE — CAST PADDING 4" WEBRIL STERILE

## (undated) DEVICE — BLADE KNIFE SURG 15 371115

## (undated) DEVICE — DRSG GAUZE 4X4" TRAY

## (undated) DEVICE — DRILL BIT SYN QUICK COUPLING 3.5X110MM 310.35

## (undated) DEVICE — NDL ECLIPSE 22GA 1.5"

## (undated) DEVICE — DRAPE C-ARM 60X42" 1013

## (undated) DEVICE — SOL WATER IRRIG 1000ML BOTTLE 07139-09

## (undated) DEVICE — CAST PADDING 4" WEBRIL UNSTERILE

## (undated) DEVICE — GLOVE PROTEXIS W/NEU-THERA 8.5  2D73TE85

## (undated) DEVICE — DRILL BIT QUICK COUPLING 2.5X110MM GOLD 310.25

## (undated) DEVICE — TAPE MEDIPORE 4"X10YD 2964

## (undated) DEVICE — DRAPE CONVERTORS U-DRAPE 60X72" 8476

## (undated) DEVICE — PACK EXTREMITY LOWER STD (ARTHROSCOPY) 29180

## (undated) DEVICE — BNDG ELASTIC 6" DBL LENGTH UNSTERILE 6611-16

## (undated) DEVICE — ESU PENCIL W/HOLSTER

## (undated) DEVICE — SU PROLENE 4-0 PS-2 18" 8682G

## (undated) DEVICE — SYR 10ML FINGER CONTROL W/O NDL 309695

## (undated) DEVICE — DRSG KERLIX 4 1/2"X4YDS ROLL 6730

## (undated) DEVICE — ESU CLEANER TIP 31142717

## (undated) DEVICE — STPL SKIN 35W APPOSE 8886803712

## (undated) DEVICE — ESU GROUND PAD UNIVERSAL W/O CORD

## (undated) DEVICE — SOL NACL 0.9% IRRIG 1000ML BOTTLE 07138-09

## (undated) DEVICE — SUCTION TIP YANKAUER W/O VENT BULBOUS TIP

## (undated) DEVICE — SYR EAR BULB 2OZ

## (undated) RX ORDER — LIDOCAINE HYDROCHLORIDE 20 MG/ML
INJECTION, SOLUTION EPIDURAL; INFILTRATION; INTRACAUDAL; PERINEURAL
Status: DISPENSED
Start: 2018-02-28

## (undated) RX ORDER — HYDROMORPHONE HYDROCHLORIDE 1 MG/ML
INJECTION, SOLUTION INTRAMUSCULAR; INTRAVENOUS; SUBCUTANEOUS
Status: DISPENSED
Start: 2018-02-28

## (undated) RX ORDER — ONDANSETRON 2 MG/ML
INJECTION INTRAMUSCULAR; INTRAVENOUS
Status: DISPENSED
Start: 2018-02-28

## (undated) RX ORDER — FENTANYL CITRATE 50 UG/ML
INJECTION, SOLUTION INTRAMUSCULAR; INTRAVENOUS
Status: DISPENSED
Start: 2018-02-28

## (undated) RX ORDER — LIDOCAINE HYDROCHLORIDE 20 MG/ML
INJECTION, SOLUTION EPIDURAL; INFILTRATION; INTRACAUDAL; PERINEURAL
Status: DISPENSED
Start: 2019-09-06

## (undated) RX ORDER — PROPOFOL 10 MG/ML
INJECTION, EMULSION INTRAVENOUS
Status: DISPENSED
Start: 2018-02-28

## (undated) RX ORDER — BACITRACIN 50000 [IU]/1
INJECTION, POWDER, FOR SOLUTION INTRAMUSCULAR
Status: DISPENSED
Start: 2018-02-28

## (undated) RX ORDER — FENTANYL CITRATE 50 UG/ML
INJECTION, SOLUTION INTRAMUSCULAR; INTRAVENOUS
Status: DISPENSED
Start: 2019-09-06

## (undated) RX ORDER — PROPOFOL 10 MG/ML
INJECTION, EMULSION INTRAVENOUS
Status: DISPENSED
Start: 2019-09-06

## (undated) RX ORDER — BUPIVACAINE HYDROCHLORIDE 5 MG/ML
INJECTION, SOLUTION EPIDURAL; INTRACAUDAL
Status: DISPENSED
Start: 2019-09-06

## (undated) RX ORDER — ONDANSETRON 2 MG/ML
INJECTION INTRAMUSCULAR; INTRAVENOUS
Status: DISPENSED
Start: 2019-09-06